# Patient Record
Sex: FEMALE | Race: WHITE | NOT HISPANIC OR LATINO | Employment: UNEMPLOYED | URBAN - NONMETROPOLITAN AREA
[De-identification: names, ages, dates, MRNs, and addresses within clinical notes are randomized per-mention and may not be internally consistent; named-entity substitution may affect disease eponyms.]

---

## 2017-08-18 ENCOUNTER — HOSPITAL ENCOUNTER (EMERGENCY)
Facility: HOSPITAL | Age: 22
Discharge: HOME | End: 2017-08-18
Attending: EMERGENCY MEDICINE | Admitting: EMERGENCY MEDICINE
Payer: COMMERCIAL

## 2017-08-18 ENCOUNTER — APPOINTMENT (EMERGENCY)
Dept: RADIOLOGY | Facility: HOSPITAL | Age: 22
End: 2017-08-18
Payer: COMMERCIAL

## 2017-08-18 VITALS
TEMPERATURE: 99.4 F | SYSTOLIC BLOOD PRESSURE: 122 MMHG | RESPIRATION RATE: 16 BRPM | HEART RATE: 69 BPM | BODY MASS INDEX: 40.85 KG/M2 | WEIGHT: 230.6 LBS | DIASTOLIC BLOOD PRESSURE: 61 MMHG | OXYGEN SATURATION: 99 %

## 2017-08-18 DIAGNOSIS — M94.0 COSTOCHONDRITIS, ACUTE: Primary | ICD-10-CM

## 2017-08-18 LAB
DEPRECATED D DIMER PPP: 383 NG/ML (FEU) (ref 0–424)
HCG UR QL: NEGATIVE
HOLD SPECIMEN: NORMAL
HOLD SPECIMEN: NORMAL
TROPONIN I SERPL-MCNC: <0.02 NG/ML

## 2017-08-18 PROCEDURE — 36415 COLL VENOUS BLD VENIPUNCTURE: CPT | Performed by: EMERGENCY MEDICINE

## 2017-08-18 PROCEDURE — 99285 EMERGENCY DEPT VISIT HI MDM: CPT

## 2017-08-18 PROCEDURE — 93005 ELECTROCARDIOGRAM TRACING: CPT | Performed by: EMERGENCY MEDICINE

## 2017-08-18 PROCEDURE — 84484 ASSAY OF TROPONIN QUANT: CPT | Performed by: EMERGENCY MEDICINE

## 2017-08-18 PROCEDURE — 85379 FIBRIN DEGRADATION QUANT: CPT | Performed by: EMERGENCY MEDICINE

## 2017-08-18 PROCEDURE — 71020 HB CHEST X-RAY 2VW FRONTAL&LATL: CPT

## 2017-08-18 PROCEDURE — 81025 URINE PREGNANCY TEST: CPT | Performed by: EMERGENCY MEDICINE

## 2017-08-18 RX ORDER — NAPROXEN 500 MG/1
500 TABLET ORAL 2 TIMES DAILY WITH MEALS
Qty: 30 TABLET | Refills: 0 | Status: SHIPPED | OUTPATIENT
Start: 2017-08-18 | End: 2018-07-05 | Stop reason: ALTCHOICE

## 2017-08-22 LAB
ATRIAL RATE: 69 BPM
P AXIS: 43 DEGREES
PR INTERVAL: 152 MS
QRS AXIS: 28 DEGREES
QRSD INTERVAL: 90 MS
QT INTERVAL: 386 MS
QTC INTERVAL: 413 MS
T WAVE AXIS: 32 DEGREES
VENTRICULAR RATE: 69 BPM

## 2018-07-05 ENCOUNTER — HOSPITAL ENCOUNTER (EMERGENCY)
Facility: HOSPITAL | Age: 23
Discharge: HOME | End: 2018-07-05
Attending: EMERGENCY MEDICINE
Payer: OTHER MISCELLANEOUS

## 2018-07-05 ENCOUNTER — APPOINTMENT (EMERGENCY)
Dept: RADIOLOGY | Facility: HOSPITAL | Age: 23
End: 2018-07-05
Payer: OTHER MISCELLANEOUS

## 2018-07-05 VITALS
OXYGEN SATURATION: 98 % | HEART RATE: 76 BPM | RESPIRATION RATE: 16 BRPM | DIASTOLIC BLOOD PRESSURE: 92 MMHG | SYSTOLIC BLOOD PRESSURE: 154 MMHG | WEIGHT: 205.69 LBS | BODY MASS INDEX: 36.44 KG/M2 | TEMPERATURE: 98.7 F

## 2018-07-05 DIAGNOSIS — S61.211A LACERATION OF LEFT INDEX FINGER: Primary | ICD-10-CM

## 2018-07-05 PROCEDURE — 90715 TDAP VACCINE 7 YRS/> IM: CPT | Performed by: EMERGENCY MEDICINE

## 2018-07-05 PROCEDURE — 90471 IMMUNIZATION ADMIN: CPT

## 2018-07-05 PROCEDURE — 99283 EMERGENCY DEPT VISIT LOW MDM: CPT

## 2018-07-05 PROCEDURE — 73130 X-RAY EXAM OF HAND: CPT

## 2018-07-05 RX ORDER — CLINDAMYCIN HYDROCHLORIDE 150 MG/1
300 CAPSULE ORAL ONCE
Status: COMPLETED | OUTPATIENT
Start: 2018-07-05 | End: 2018-07-05

## 2018-07-05 RX ORDER — LIDOCAINE HYDROCHLORIDE 10 MG/ML
5 INJECTION, SOLUTION EPIDURAL; INFILTRATION; INTRACAUDAL; PERINEURAL ONCE
Status: COMPLETED | OUTPATIENT
Start: 2018-07-05 | End: 2018-07-05

## 2018-07-05 RX ORDER — CLINDAMYCIN HYDROCHLORIDE 300 MG/1
300 CAPSULE ORAL EVERY 8 HOURS SCHEDULED
Qty: 15 CAPSULE | Refills: 0 | Status: SHIPPED | OUTPATIENT
Start: 2018-07-05 | End: 2018-07-10

## 2018-07-05 RX ADMIN — LIDOCAINE HYDROCHLORIDE 5 ML: 10 INJECTION, SOLUTION EPIDURAL; INFILTRATION; INTRACAUDAL; PERINEURAL at 22:43

## 2018-07-05 RX ADMIN — CLINDAMYCIN HYDROCHLORIDE 300 MG: 150 CAPSULE ORAL at 22:42

## 2018-07-05 RX ADMIN — TETANUS TOXOID, REDUCED DIPHTHERIA TOXOID AND ACELLULAR PERTUSSIS VACCINE, ADSORBED 0.5 ML: 5; 2.5; 8; 8; 2.5 SUSPENSION INTRAMUSCULAR at 22:44

## 2018-07-06 NOTE — ED PROVIDER NOTES
History  Chief Complaint   Patient presents with    Finger Injury     Patient cut her left index finger around 830pm and now has increased pain and swelling  26-year-old female presents with superficial laceration 2 cm to the left index finger proximal dorsal surface  Patient states this occurred 2 hours ago while at work  She states that she was using a mario knife to unclog drain and lacerated herself  Patient has full flexion and extension of the finger        Laceration   Location: Left index finger dorsal surface  Depth:  Cutaneous  Quality: straight    Bleeding: venous    Time since incident:  2 hours  Laceration mechanism:  Knife  Pain details:     Quality:  Aching    Severity:  No pain    Timing:  Constant  Relieved by:  Nothing  Worsened by:  Nothing  Tetanus status:  Out of date  Associated symptoms: no fever        None       Past Medical History:   Diagnosis Date    Depression     Insomnia     UTI (urinary tract infection)     Vertigo        Past Surgical History:   Procedure Laterality Date    WISDOM TOOTH EXTRACTION         History reviewed  No pertinent family history  I have reviewed and agree with the history as documented  Social History   Substance Use Topics    Smoking status: Current Every Day Smoker     Packs/day: 0 50     Types: Cigarettes    Smokeless tobacco: Never Used    Alcohol use Yes      Comment: occasional        Review of Systems   Constitutional: Negative for activity change, appetite change and fever  HENT: Negative for congestion, dental problem, drooling and ear discharge  Eyes: Negative for discharge and itching  Respiratory: Negative for apnea, choking and chest tightness  Cardiovascular: Negative for chest pain and leg swelling  Gastrointestinal: Negative for abdominal distention, abdominal pain and anal bleeding  Endocrine: Negative for cold intolerance and heat intolerance     Genitourinary: Negative for difficulty urinating, dyspareunia and dysuria  Musculoskeletal: Negative for arthralgias, back pain and gait problem  Skin: Positive for wound  Superficial laceration 2 cm to the left index finger   Allergic/Immunologic: Negative for environmental allergies and food allergies  Neurological: Negative for dizziness, facial asymmetry and headaches  Hematological: Negative for adenopathy  Psychiatric/Behavioral: Negative for agitation, behavioral problems, confusion and decreased concentration  All other systems reviewed and are negative  Physical Exam  Physical Exam   Constitutional: She appears well-developed and well-nourished  HENT:   Head: Normocephalic  Eyes: Right eye exhibits no discharge  Neck: Normal range of motion  No JVD present  No tracheal deviation present  No thyromegaly present  Cardiovascular: Normal rate and regular rhythm  Pulmonary/Chest: Effort normal  No respiratory distress  She has no wheezes  Abdominal: Soft  She exhibits no distension  There is no tenderness  There is no guarding  Musculoskeletal: Normal range of motion  She exhibits no edema or deformity  Neurological: She is alert  She displays normal reflexes  No cranial nerve deficit  Coordination normal    Skin: Capillary refill takes less than 2 seconds  2 cm laceration to the dorsal surface of the proximal index finger   Psychiatric: She has a normal mood and affect  Vitals reviewed        Vital Signs  ED Triage Vitals [07/05/18 2207]   Temperature Pulse Respirations Blood Pressure SpO2   98 7 °F (37 1 °C) 76 16 154/92 98 %      Temp Source Heart Rate Source Patient Position - Orthostatic VS BP Location FiO2 (%)   Temporal Monitor Sitting Right arm --      Pain Score       7           Vitals:    07/05/18 2207   BP: 154/92   Pulse: 76   Patient Position - Orthostatic VS: Sitting       Visual Acuity      ED Medications  Medications   tetanus-diphtheria-acellular pertussis (BOOSTRIX) IM injection 0 5 mL (0 5 mL Intramuscular Given 7/5/18 2244)   lidocaine (PF) (XYLOCAINE-MPF) 1 % injection 5 mL (5 mL Infiltration Given by Other 7/5/18 2243)   clindamycin (CLEOCIN) capsule 300 mg (300 mg Oral Given 7/5/18 2242)       Diagnostic Studies  Results Reviewed     None                 XR hand 3+ views LEFT   ED Interpretation by Chani Weldon DO (07/05 2250)   No fx                  Procedures  Procedures       Phone Contacts  ED Phone Contact    ED Course  ED Course as of Jul 05 2251   Thu Jul 05, 2018 2231 No fx  XR hand 3+ views LEFT                               MDM    CritCare Time    Disposition  Final diagnoses:   Laceration of left index finger     Time reflects when diagnosis was documented in both MDM as applicable and the Disposition within this note     Time User Action Codes Description Comment    7/5/2018 10:23 PM eMl Wadsworth Add [H44 491A] Laceration of left index finger       ED Disposition     ED Disposition Condition Comment    Discharge  Jj Small discharge to home/self care  Condition at discharge: Good        Follow-up Information    None         Patient's Medications   Discharge Prescriptions    CLINDAMYCIN (CLEOCIN) 300 MG CAPSULE    Take 1 capsule (300 mg total) by mouth every 8 (eight) hours for 5 days       Start Date: 7/5/2018  End Date: 7/10/2018       Order Dose: 300 mg       Quantity: 15 capsule    Refills: 0     No discharge procedures on file      ED Provider  Electronically Signed by           Chani Weldon DO  07/05/18 2252

## 2018-07-06 NOTE — DISCHARGE INSTRUCTIONS
Finger Laceration   WHAT YOU NEED TO KNOW:   A finger laceration is a deep cut in your skin  It is often caused by a sharp object, such as a knife, or blunt force to your finger  Your blood vessels, bones, joints, tendons, or nerves may also be injured  DISCHARGE INSTRUCTIONS:   Return to the emergency department if:   · Your wound comes apart  · Blood soaks through your bandage  · You have severe pain in your finger or hand  · Your finger is pale and cold  · You have sudden trouble moving your finger  · Your swelling suddenly gets worse  · You have red streaks on your skin coming from your wound  Contact your healthcare provider or hand specialist if:   · You have new numbness or tingling  · Your finger feels warm, looks swollen or red, and is draining pus  · You have a fever  · You have questions or concerns about your condition or care  Medicines: You may  need any of the following:  · Antibiotics  help prevent a bacterial infection  · Acetaminophen  decreases pain and fever  It is available without a doctor's order  Ask how much to take and how often to take it  Follow directions  Read the labels of all other medicines you are using to see if they also contain acetaminophen, or ask your doctor or pharmacist  Acetaminophen can cause liver damage if not taken correctly  Do not use more than 4 grams (4,000 milligrams) total of acetaminophen in one day  · Prescription pain medicine  may be given  Ask your healthcare provider how to take this medicine safely  Some prescription pain medicines contain acetaminophen  Do not take other medicines that contain acetaminophen without talking to your healthcare provider  Too much acetaminophen may cause liver damage  Prescription pain medicine may cause constipation  Ask your healthcare provider how to prevent or treat constipation  · Take your medicine as directed    Contact your healthcare provider if you think your medicine is not helping or if you have side effects  Tell him or her if you are allergic to any medicine  Keep a list of the medicines, vitamins, and herbs you take  Include the amounts, and when and why you take them  Bring the list or the pill bottles to follow-up visits  Carry your medicine list with you in case of an emergency  Self-care:   · Apply ice  on your finger for 15 to 20 minutes every hour or as directed  Use an ice pack, or put crushed ice in a plastic bag  Cover it with a towel before you apply it to your skin  Ice helps prevent tissue damage and decreases swelling and pain  · Elevate  your hand above the level of your heart as often as you can  This will help decrease swelling and pain  Prop your hand on pillows or blankets to keep it elevated comfortably  · Wear your splint as directed  A splint will decrease movement and stress on your wound  The splint may help your wound heal faster  Ask your healthcare provider how to apply and remove a splint  · Apply ointments to decrease scarring  Do not apply ointments until your healthcare provider says it is okay  You may need to wait until your wound is healed  Ask which ointment to buy and how often to use it  Wound care:   · Do not get your wound wet until your healthcare provider says it is okay  Do not soak your hand in water  Do not go swimming until your healthcare provider says it is okay  When your healthcare provider says it is okay, carefully wash around the wound with soap and water  Let soap and water run over your wound  Gently pat the area dry or allow it to air dry  · Change your bandages when they get wet, dirty, or after washing  Apply new, clean bandages as directed  Do not apply elastic bandages or tape too tightly  Do not put powders or lotions on your wound  · Apply antibiotic ointment as directed  Your healthcare provider may give you antibiotic ointment to put over your wound if you have stitches   If you have Strips-Strips over your wound, let them dry up and fall off on their own  If they do not fall off within 14 days, gently remove them  If you have glue over your wound, do not remove or pick at it  If your glue comes off, do not replace it with glue that you have at home  · Check your wound every day for signs of infection  Signs of infection include swelling, redness, or pus  Follow up with your healthcare provider or hand specialist in 2 days:  Write down your questions so you remember to ask them during your visits  © 2017 [employer]  Information is for End User's use only and may not be sold, redistributed or otherwise used for commercial purposes  All illustrations and images included in CareNotes® are the copyrighted property of [employer] , [employer]  or Ren Calles  The above information is an  only  It is not intended as medical advice for individual conditions or treatments  Talk to your doctor, nurse or pharmacist before following any medical regimen to see if it is safe and effective for you

## 2018-07-06 NOTE — ED PROCEDURE NOTE
PROCEDURE  Lac Repair  Date/Time: 7/5/2018 10:50 PM  Performed by: Bethel Sims  Authorized by: Bethel Sims   Consent: Verbal consent obtained  Location: Left index finger dorsum proximal between the MCP and PIP joint  Wound length (cm): 2  Tendon involvement: none  Anesthesia: local infiltration    Anesthesia:  Local Anesthetic: lidocaine 1% with epinephrine  Anesthetic total (ml): 3  Wound Dehiscence:  Superficial Wound Dehiscence: simple closure      Procedure Details:  Irrigation solution: saline  Irrigation method: syringe  Amount of cleaning: Scrub with chlorhexidine scrub brush  Skin closure: 5-0 nylon  Number of sutures: 4    Technique: simple  Approximation: close  Approximation difficulty: simple  Dressing: 4x4 sterile gauze and gauze roll  Patient tolerance: Patient tolerated the procedure well with no immediate complications           Jake Nj DO  07/05/18 2313

## 2019-05-09 ENCOUNTER — OFFICE VISIT (OUTPATIENT)
Dept: URGENT CARE | Facility: CLINIC | Age: 24
End: 2019-05-09
Payer: COMMERCIAL

## 2019-05-09 VITALS
HEART RATE: 78 BPM | DIASTOLIC BLOOD PRESSURE: 92 MMHG | RESPIRATION RATE: 18 BRPM | OXYGEN SATURATION: 98 % | TEMPERATURE: 98.3 F | SYSTOLIC BLOOD PRESSURE: 159 MMHG

## 2019-05-09 DIAGNOSIS — Z01.10 NORMAL EAR EXAM: Primary | ICD-10-CM

## 2019-05-09 PROCEDURE — G0382 LEV 3 HOSP TYPE B ED VISIT: HCPCS | Performed by: NURSE PRACTITIONER

## 2019-07-18 ENCOUNTER — APPOINTMENT (OUTPATIENT)
Dept: URGENT CARE | Facility: CLINIC | Age: 24
End: 2019-07-18
Payer: OTHER MISCELLANEOUS

## 2019-07-18 ENCOUNTER — HOSPITAL ENCOUNTER (EMERGENCY)
Facility: HOSPITAL | Age: 24
Discharge: HOME | End: 2019-07-18
Attending: EMERGENCY MEDICINE | Admitting: EMERGENCY MEDICINE
Payer: OTHER MISCELLANEOUS

## 2019-07-18 VITALS
OXYGEN SATURATION: 98 % | HEART RATE: 79 BPM | TEMPERATURE: 98.1 F | RESPIRATION RATE: 19 BRPM | DIASTOLIC BLOOD PRESSURE: 103 MMHG | SYSTOLIC BLOOD PRESSURE: 179 MMHG | BODY MASS INDEX: 33.59 KG/M2 | WEIGHT: 189.6 LBS
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
## 2019-07-18 PROCEDURE — 99283 EMERGENCY DEPT VISIT LOW MDM: CPT

## 2019-07-18 PROCEDURE — 96372 THER/PROPH/DIAG INJ SC/IM: CPT

## 2019-07-18 PROCEDURE — 90471 IMMUNIZATION ADMIN: CPT

## 2019-07-18 PROCEDURE — 99284 EMERGENCY DEPT VISIT MOD MDM: CPT | Performed by: EMERGENCY MEDICINE

## 2019-07-18 PROCEDURE — 90675 RABIES VACCINE IM: CPT | Performed by: EMERGENCY MEDICINE

## 2019-07-18 PROCEDURE — G0382 LEV 3 HOSP TYPE B ED VISIT: HCPCS

## 2019-07-18 PROCEDURE — 90375 RABIES IG IM/SC: CPT | Performed by: EMERGENCY MEDICINE

## 2019-07-18 RX ORDER — AMOXICILLIN AND CLAVULANATE POTASSIUM 875; 125 MG/1; MG/1
1 TABLET, FILM COATED ORAL ONCE
Status: COMPLETED | OUTPATIENT
Start: 2019-07-18 | End: 2019-07-18

## 2019-07-18 RX ORDER — AMOXICILLIN AND CLAVULANATE POTASSIUM 875; 125 MG/1; MG/1
1 TABLET, FILM COATED ORAL EVERY 12 HOURS
Qty: 10 TABLET | Refills: 0 | Status: SHIPPED | OUTPATIENT
Start: 2019-07-18 | End: 2019-07-23

## 2019-07-18 RX ADMIN — RABIES IMMUNE GLOBULIN (HUMAN) 1710 UNITS: 300 INJECTION, SOLUTION INFILTRATION; INTRAMUSCULAR at 23:01

## 2019-07-18 RX ADMIN — AMOXICILLIN AND CLAVULANATE POTASSIUM 1 TABLET: 875; 125 TABLET, FILM COATED ORAL at 22:55

## 2019-07-18 RX ADMIN — Medication 1 ML: at 22:56

## 2019-07-19 NOTE — DISCHARGE INSTRUCTIONS
Please follow-up with primary care provider, anything changes or worsens, please return emergency department  I am giving you antibiotics to treat presumed dog bite    You will need repeat vaccines for rabies in 3 days on Sunday, July 21st, 7 days, July 25, 14 days, on august 1st

## 2019-07-20 NOTE — ED PROVIDER NOTES
History  Chief Complaint   Patient presents with    Dog Bite     Patient was restaining a dog for a blood draw and either got scrathed or bite by a dog  Dog was not vaccinated or up to date on vaccinations  Patient is here for [facility] and rabies shot      HPI   22-year-old woman presents after dog bite  Patient works in a veterinary in the office, was holding down a dog that is known to not be vaccinated  She thinks that the dog bit her face however there is some confusion if it was a bite or scratch  Patient is up-to-date on her tetanus  Denies any headache neck pain chest pain shortness of breath abdominal pain  None       Past Medical History:   Diagnosis Date    Depression     Insomnia     UTI (urinary tract infection)     Vertigo        Past Surgical History:   Procedure Laterality Date    WISDOM TOOTH EXTRACTION         History reviewed  No pertinent family history  I have reviewed and agree with the history as documented  Social History     Tobacco Use    Smoking status: Current Every Day Smoker     Packs/day: 0 50     Types: Cigarettes    Smokeless tobacco: Never Used   Substance Use Topics    Alcohol use: Yes     Comment: occasional    Drug use: No        Review of Systems   Constitutional: Negative  Negative for chills and fever  HENT: Negative  Negative for congestion and sore throat  Eyes: Negative  Negative for discharge and redness  Respiratory: Negative  Negative for chest tightness and shortness of breath  Cardiovascular: Negative  Negative for chest pain and palpitations  Gastrointestinal: Negative  Negative for abdominal pain, nausea and vomiting  Endocrine: Negative  Negative for cold intolerance and polyphagia  Genitourinary: Negative  Negative for difficulty urinating and dysuria  Musculoskeletal: Negative  Negative for arthralgias and back pain  Skin: Positive for wound  Negative for color change  Allergic/Immunologic: Negative    Negative for environmental allergies  Neurological: Negative  Negative for dizziness, weakness and headaches  Hematological: Negative  Psychiatric/Behavioral: Negative  Negative for behavioral problems  The patient is not nervous/anxious  All other systems reviewed and are negative  Physical Exam  Physical Exam   Constitutional: She is oriented to person, place, and time  She appears well-developed and well-nourished  No distress  HENT:   Head: Normocephalic and atraumatic  Right Ear: External ear normal    Left Ear: External ear normal    Mouth/Throat: Oropharynx is clear and moist    Patient is broken skin adjacent to the left eye, conjunctiva is intact without scratch her redness  Extraocular muscles are intact  Eyes: Pupils are equal, round, and reactive to light  Conjunctivae and EOM are normal  Right eye exhibits no discharge  Left eye exhibits no discharge  No scleral icterus  Neck: Normal range of motion  Neck supple  No tracheal deviation present  No thyromegaly present  Cardiovascular: Normal rate, regular rhythm and intact distal pulses  Exam reveals no gallop and no friction rub  No murmur heard  Pulmonary/Chest: Effort normal and breath sounds normal  No stridor  No respiratory distress  She has no wheezes  She has no rales  Abdominal: Soft  Bowel sounds are normal  She exhibits no distension  There is no tenderness  There is no rebound and no guarding  Musculoskeletal: Normal range of motion  She exhibits no edema or deformity  Neurological: She is alert and oriented to person, place, and time  No cranial nerve deficit  Skin: Skin is warm and dry  No rash noted  She is not diaphoretic  No erythema  Psychiatric: She has a normal mood and affect  Her behavior is normal  Thought content normal    Nursing note and vitals reviewed        Vital Signs  ED Triage Vitals [07/18/19 2017]   Temperature Pulse Respirations Blood Pressure SpO2   98 1 °F (36 7 °C) 79 19 (!) 179/103 98 % Temp Source Heart Rate Source Patient Position - Orthostatic VS BP Location FiO2 (%)   Temporal Monitor Sitting Right arm --      Pain Score       6           Vitals:    07/18/19 2017   BP: (!) 179/103   Pulse: 79   Patient Position - Orthostatic VS: Sitting         Visual Acuity      ED Medications  Medications   rabies vaccine, human diploid (IMOVAX RABIES) IM injection 1 mL (1 mL Intramuscular Given 7/18/19 2256)   rabies immune globulin, human (HyperRAB) injection 1,710 Units (1,710 Units Infiltration Given 7/18/19 2301)   amoxicillin-clavulanate (AUGMENTIN) 875-125 mg per tablet 1 tablet (1 tablet Oral Given 7/18/19 2255)       Diagnostic Studies  Results Reviewed     None                 No orders to display              Procedures  Procedures       ED Course      I personally discussed return precautions with this patient and family  I provided the patient with written discharge instructions and particularly highlighted specific areas of interest to this patient, including but not limited to: medications for symptom managment, follow up recommendations, and return precautions  Patient and family are in agreement with this plan as outlined above  MDM  Number of Diagnoses or Management Options  Bite from dog:   Diagnosis management comments: 61-year-old woman presents after dog bite versus scratch  Patient is a  and believes this was a bite  Discussed cc recommendation of observing the animal for 10 days verses vaccination  Patient states she knows this talk was not vaccinated, she has no way of knowing if the dog will be watched and has no contact with the owner  Discussed requiring for different vaccinations +meeting globulin  Patient is in agreement and would like rabies immunoglobulin and vaccinations  Will treat patient your coulee with Augmentin here and give her outpatient antibiotics        Disposition  Final diagnoses:   Bite from dog     Time reflects when diagnosis was documented in both MDM as applicable and the Disposition within this note     Time User Action Codes Description Comment    7/18/2019 11:01 PM Valeda Alonzo  0XXA] Bite from dog       ED Disposition     ED Disposition Condition Date/Time Comment    Discharge Stable Thu Jul 18, 2019 11:04 PM Sylvain Murrell discharge to home/self care  Follow-up Information    None         Discharge Medication List as of 7/18/2019 11:04 PM      START taking these medications    Details   amoxicillin-clavulanate (AUGMENTIN) 875-125 mg per tablet Take 1 tablet by mouth every 12 (twelve) hours for 5 days, Starting Thu 7/18/2019, Until Tue 7/23/2019, Print           No discharge procedures on file      ED Provider  Electronically Signed by           Margret Colmenares MD  07/19/19 6638

## 2019-07-21 ENCOUNTER — HOSPITAL ENCOUNTER (EMERGENCY)
Facility: HOSPITAL | Age: 24
Discharge: HOME | End: 2019-07-21
Attending: EMERGENCY MEDICINE | Admitting: EMERGENCY MEDICINE
Payer: OTHER MISCELLANEOUS

## 2019-07-21 VITALS
TEMPERATURE: 98.5 F | RESPIRATION RATE: 16 BRPM | BODY MASS INDEX: 33.59 KG/M2 | WEIGHT: 189.6 LBS | HEIGHT: 63 IN | OXYGEN SATURATION: 98 % | HEART RATE: 87 BPM | SYSTOLIC BLOOD PRESSURE: 126 MMHG | DIASTOLIC BLOOD PRESSURE: 82 MMHG

## 2019-07-21 DIAGNOSIS — Z23 ENCOUNTER FOR REPEAT ADMINISTRATION OF RABIES VACCINATION: Primary | ICD-10-CM

## 2019-07-21 PROCEDURE — 99281 EMR DPT VST MAYX REQ PHY/QHP: CPT | Performed by: PHYSICIAN ASSISTANT

## 2019-07-21 PROCEDURE — 90471 IMMUNIZATION ADMIN: CPT

## 2019-07-21 PROCEDURE — 90675 RABIES VACCINE IM: CPT | Performed by: PHYSICIAN ASSISTANT

## 2019-07-21 RX ADMIN — RABIES VIRUS STRAIN PM-1503-3M ANTIGEN (PROPIOLACTONE INACTIVATED) AND WATER 1 ML: KIT at 13:52

## 2019-07-21 NOTE — ED PROVIDER NOTES
History  Chief Complaint   Patient presents with    Follow Up Rabies     Patient here for rabies series, day 3     Patient presents to the emergency department today for evaluation dog bite  She was here on the 18th of this month and evaluated  There is uncertain history of dog bite versus dog scratch just lateral to the left eye  She was treated with rabies series and Augmentin  She is here for her 3 day re-evaluation of rabies series  She states she is taking her Augmentin  She denies any bleeding or drainage  She has a minimal amount of contusion just lateral to left eye complains of occasional dizziness  No fevers chills sweats redness bleeding or discharge  Prior to Admission Medications   Prescriptions Last Dose Informant Patient Reported? Taking?   amoxicillin-clavulanate (AUGMENTIN) 875-125 mg per tablet   No No   Sig: Take 1 tablet by mouth every 12 (twelve) hours for 5 days      Facility-Administered Medications: None       Past Medical History:   Diagnosis Date    Depression     Insomnia     UTI (urinary tract infection)     Vertigo        Past Surgical History:   Procedure Laterality Date    WISDOM TOOTH EXTRACTION         History reviewed  No pertinent family history  I have reviewed and agree with the history as documented  Social History     Tobacco Use    Smoking status: Current Every Day Smoker     Packs/day: 0 50     Types: Cigarettes    Smokeless tobacco: Never Used   Substance Use Topics    Alcohol use: Yes     Comment: occasional    Drug use: No        Review of Systems   Constitutional: Negative  HENT: Negative  Respiratory: Negative  Cardiovascular: Negative  Skin: Positive for wound  All other systems reviewed and are negative  Physical Exam  Physical Exam   Constitutional: She is oriented to person, place, and time  She appears well-developed and well-nourished  No distress  HENT:   Head: Normocephalic         Right Ear: External ear normal    Left Ear: External ear normal    Nose: Nose normal    Mouth/Throat: Oropharynx is clear and moist  No oropharyngeal exudate  Patient has an area of age contusion in this region which is very minimally tender  No swelling  No bleeding or discharge  No evidence of fluctuance  Eyes: Pupils are equal, round, and reactive to light  Conjunctivae and EOM are normal  Right eye exhibits no discharge  Left eye exhibits no discharge  No scleral icterus  Neck: Normal range of motion  No JVD present  No tracheal deviation present  No thyromegaly present  Cardiovascular: Normal rate  Pulmonary/Chest: Effort normal    Musculoskeletal: Normal range of motion  Lymphadenopathy:     She has no cervical adenopathy  Neurological: She is alert and oriented to person, place, and time  Skin: Capillary refill takes less than 2 seconds  She is not diaphoretic  Psychiatric: She has a normal mood and affect         Vital Signs  ED Triage Vitals [07/21/19 1341]   Temperature Pulse Respirations Blood Pressure SpO2   98 5 °F (36 9 °C) 78 16 143/96 98 %      Temp Source Heart Rate Source Patient Position - Orthostatic VS BP Location FiO2 (%)   Temporal Monitor Sitting Right arm --      Pain Score       3           Vitals:    07/21/19 1341   BP: 143/96   Pulse: 78   Patient Position - Orthostatic VS: Sitting         Visual Acuity      ED Medications  Medications   rabies vaccine, human diploid (IMOVAX RABIES) IM injection 1 mL (has no administration in time range)       Diagnostic Studies  Results Reviewed     None                 No orders to display              Procedures  Procedures       ED Course                               MDM    Disposition  Final diagnoses:   Encounter for repeat administration of rabies vaccination     Time reflects when diagnosis was documented in both MDM as applicable and the Disposition within this note     Time User Action Codes Description Comment    7/21/2019  1:43 PM Natalia Ontiveros D Add [Z23] Encounter for repeat administration of rabies vaccination       ED Disposition     ED Disposition Condition Date/Time Comment    Discharge Good Sun Jul 21, 2019  1:43 PM Vera Flores discharge to home/self care  Follow-up Information     Follow up With Specialties Details Why Contact Info Additional Information    [facility] Emergency Department Emergency Medicine Go in 4 days  [place] [id number]  [phone] [place] ED, [address], [address]          Patient's Medications   Discharge Prescriptions    No medications on file     No discharge procedures on file      ED Provider  Electronically Signed by           Jaymie Holder PA-C  07/21/19 4859

## 2019-07-25 ENCOUNTER — HOSPITAL ENCOUNTER (EMERGENCY)
Facility: HOSPITAL | Age: 24
Discharge: HOME | End: 2019-07-25
Attending: EMERGENCY MEDICINE
Payer: COMMERCIAL

## 2019-07-25 VITALS
HEIGHT: 63 IN | RESPIRATION RATE: 16 BRPM | TEMPERATURE: 98.3 F | SYSTOLIC BLOOD PRESSURE: 135 MMHG | HEART RATE: 83 BPM | BODY MASS INDEX: 33.59 KG/M2 | WEIGHT: 189.6 LBS | OXYGEN SATURATION: 100 % | DIASTOLIC BLOOD PRESSURE: 80 MMHG

## 2019-07-25 DIAGNOSIS — Z23 ENCOUNTER FOR REPEAT ADMINISTRATION OF RABIES VACCINATION: Primary | ICD-10-CM

## 2019-07-25 PROCEDURE — 99281 EMR DPT VST MAYX REQ PHY/QHP: CPT | Performed by: PHYSICIAN ASSISTANT

## 2019-07-25 PROCEDURE — 90471 IMMUNIZATION ADMIN: CPT

## 2019-07-25 PROCEDURE — 90675 RABIES VACCINE IM: CPT

## 2019-07-25 RX ADMIN — RABIES VIRUS STRAIN PM-1503-3M ANTIGEN (PROPIOLACTONE INACTIVATED) AND WATER 1 ML: KIT at 11:11

## 2019-07-25 NOTE — ED PROVIDER NOTES
History  Chief Complaint   Patient presents with    Follow Up Rabies     Patient presents for 3rd rabies shot     Patient presents to the emergency department today via private vehicle for routine rabies administration of vaccination  She he is currently here for day 7 rabies vaccine which is her 3rd  She received on initial visit today 0 followed by 2nd visit on day 3  She denies any changes from the last visit which I did see her for fortunately  There is some age contusion that is noted just lateral to the left eye however she denies tenderness bleeding or drainage from the wound  There is no history of fevers  No ocular complaints  Cannot display prior to admission medications because the patient has not been admitted in this contact  Past Medical History:   Diagnosis Date    Depression     Insomnia     UTI (urinary tract infection)     Vertigo        Past Surgical History:   Procedure Laterality Date    WISDOM TOOTH EXTRACTION         History reviewed  No pertinent family history  I have reviewed and agree with the history as documented  Social History     Tobacco Use    Smoking status: Current Every Day Smoker     Packs/day: 0 50     Types: Cigarettes    Smokeless tobacco: Never Used   Substance Use Topics    Alcohol use: Yes     Comment: occasional    Drug use: No        Review of Systems   Constitutional: Negative  HENT: Negative  Respiratory: Negative  Cardiovascular: Negative  Musculoskeletal: Negative  Skin: Positive for wound  Hematological: Negative  Psychiatric/Behavioral: Negative  All other systems reviewed and are negative  Physical Exam  Physical Exam   Constitutional: She is oriented to person, place, and time  She appears well-developed and well-nourished  No distress  HENT:   Head: Normocephalic     Right Ear: External ear normal    Left Ear: External ear normal    Nose: Nose normal    Mouth/Throat: Oropharynx is clear and moist  No oropharyngeal exudate  Patient has exhibited age contusion just left lateral to the left eye  Negative tenderness  Negative discharge bleeding  no evidence of wound dehiscence  Eyes: Pupils are equal, round, and reactive to light  Conjunctivae and EOM are normal    Neck: Normal range of motion  Neck supple  Cardiovascular: Normal rate and regular rhythm  Pulmonary/Chest: Effort normal and breath sounds normal    Musculoskeletal: Normal range of motion  She exhibits no tenderness or deformity  Neurological: She is alert and oriented to person, place, and time  Skin: Capillary refill takes less than 2 seconds  She is not diaphoretic  Psychiatric: She has a normal mood and affect  Her behavior is normal    Vitals reviewed        Vital Signs  ED Triage Vitals [07/25/19 1105]   Temperature Pulse Respirations Blood Pressure SpO2   98 3 °F (36 8 °C) 83 16 135/80 100 %      Temp Source Heart Rate Source Patient Position - Orthostatic VS BP Location FiO2 (%)   Temporal Monitor Sitting Right arm --      Pain Score       No Pain           Vitals:    07/25/19 1105   BP: 135/80   Pulse: 83   Patient Position - Orthostatic VS: Sitting         Visual Acuity      ED Medications  Medications   rabies vaccine, human diploid (IMOVAX RABIES) IM injection 1 mL (1 mL Intramuscular Given 7/25/19 1111)       Diagnostic Studies  Results Reviewed     None                 No orders to display              Procedures  Procedures       ED Course                               MDM    Disposition  Final diagnoses:   Encounter for repeat administration of rabies vaccination     Time reflects when diagnosis was documented in both MDM as applicable and the Disposition within this note     Time User Action Codes Description Comment    7/25/2019 11:08 AM Fariba Wisdom Add [Z23] Encounter for repeat administration of rabies vaccination       ED Disposition     ED Disposition Condition Date/Time Comment    Discharge Good Thu Jul 25, 2019 11:08 AM Sylvain Murrell discharge to home/self care  Follow-up Information     Follow up With Specialties Details Why Contact Info Additional Information    [facility] Emergency Department Emergency Medicine In 1 week  [place] [id number] [phone] [place] ED, [address], [address]          Patient's Medications    No medications on file     No discharge procedures on file      ED Provider  Electronically Signed by           Chapito Castro PA-C  07/25/19 1110

## 2019-08-01 ENCOUNTER — HOSPITAL ENCOUNTER (EMERGENCY)
Facility: HOSPITAL | Age: 24
Discharge: HOME | End: 2019-08-01
Attending: EMERGENCY MEDICINE
Payer: COMMERCIAL

## 2019-08-01 VITALS
TEMPERATURE: 98.3 F | HEIGHT: 63 IN | OXYGEN SATURATION: 98 % | SYSTOLIC BLOOD PRESSURE: 121 MMHG | WEIGHT: 191.14 LBS | BODY MASS INDEX: 33.87 KG/M2 | RESPIRATION RATE: 20 BRPM | DIASTOLIC BLOOD PRESSURE: 76 MMHG | HEART RATE: 80 BPM

## 2019-08-01 DIAGNOSIS — Z23 ENCOUNTER FOR REPEAT ADMINISTRATION OF RABIES VACCINATION: Primary | ICD-10-CM

## 2019-08-01 PROCEDURE — 90471 IMMUNIZATION ADMIN: CPT

## 2019-08-01 PROCEDURE — 99281 EMR DPT VST MAYX REQ PHY/QHP: CPT | Performed by: PHYSICIAN ASSISTANT

## 2019-08-01 PROCEDURE — 90675 RABIES VACCINE IM: CPT | Performed by: PHYSICIAN ASSISTANT

## 2019-08-01 RX ORDER — ACETAMINOPHEN,DIPHENHYDRAMINE HCL 500; 25 MG/1; MG/1
1 TABLET, FILM COATED ORAL
COMMUNITY
End: 2020-07-20 | Stop reason: ALTCHOICE

## 2019-08-01 RX ADMIN — RABIES VIRUS STRAIN PM-1503-3M ANTIGEN (PROPIOLACTONE INACTIVATED) AND WATER 1 ML: KIT at 10:49

## 2019-08-01 NOTE — ED PROVIDER NOTES
History  Chief Complaint   Patient presents with    Follow Up Rabies     PT is here for the last dose of rabies vaccine     27year old female presents for final rabies vaccine  Was originally seen here at our facility on 7/18/19 after getting bit vs scratched on the face by a dog while trying to draw blood where she works in [employer]  She notes area on face has healed  She notes she had bruising which has resolved  She offers no complaints  Received immunoglobulin as well as 3 prior rabies vaccines without issues  She took antibiotic as prescribed  She is here for the 4th and final rabies vaccine  History provided by:  Patient   used: No        Prior to Admission Medications   Prescriptions Last Dose Informant Patient Reported? Taking? diphenhydrAMINE-acetaminophen (TYLENOL PM)  MG TABS   Yes Yes   Sig: Take 1 tablet by mouth daily at bedtime as needed for sleep      Facility-Administered Medications: None       Past Medical History:   Diagnosis Date    Depression     Insomnia     UTI (urinary tract infection)     Vertigo        Past Surgical History:   Procedure Laterality Date    WISDOM TOOTH EXTRACTION         History reviewed  No pertinent family history  I have reviewed and agree with the history as documented  Social History     Tobacco Use    Smoking status: Current Every Day Smoker     Packs/day: 0 50     Types: Cigarettes    Smokeless tobacco: Never Used   Substance Use Topics    Alcohol use: Yes     Comment: occasional    Drug use: No        Review of Systems   Constitutional: Negative  Negative for fever  HENT: Negative  Eyes: Negative  Negative for pain, discharge, redness and visual disturbance  Respiratory: Negative  Cardiovascular: Negative  Gastrointestinal: Negative  Genitourinary: Negative  Musculoskeletal: Negative  Skin: Negative  Negative for color change and wound  Neurological: Negative      All other systems reviewed and are negative  Physical Exam  Physical Exam   Constitutional: She is oriented to person, place, and time  She appears well-developed and well-nourished  No distress  HENT:   Head: Normocephalic and atraumatic  Right Ear: Hearing, tympanic membrane, external ear and ear canal normal    Left Ear: Hearing, tympanic membrane, external ear and ear canal normal    Nose: Nose normal    Mouth/Throat: Uvula is midline, oropharynx is clear and moist and mucous membranes are normal    Eyes: Pupils are equal, round, and reactive to light  Conjunctivae, EOM and lids are normal    + glasses  Small scar <0 5 cm superior lateral to left eye, well healed  No redness or bruising  Neck: Neck supple  Cardiovascular: Normal rate, regular rhythm, normal heart sounds and intact distal pulses  No murmur heard  Pulmonary/Chest: Effort normal and breath sounds normal  No respiratory distress  She has no wheezes  Musculoskeletal: Normal range of motion  She exhibits no edema, tenderness or deformity  Neurological: She is alert and oriented to person, place, and time  Skin: Skin is warm and dry  Capillary refill takes less than 2 seconds  No rash noted  Psychiatric: She has a normal mood and affect  Nursing note and vitals reviewed        Vital Signs  ED Triage Vitals [08/01/19 1030]   Temperature Pulse Respirations Blood Pressure SpO2   98 3 °F (36 8 °C) 84 20 122/74 98 %      Temp Source Heart Rate Source Patient Position - Orthostatic VS BP Location FiO2 (%)   Temporal Monitor Lying Right arm --      Pain Score       No Pain           Vitals:    08/01/19 1030 08/01/19 1045   BP: 122/74 121/76   Pulse: 84 80   Patient Position - Orthostatic VS: Lying Lying         Visual Acuity      ED Medications  Medications   rabies vaccine, human diploid (IMOVAX RABIES) IM injection 1 mL (1 mL Intramuscular Given 8/1/19 1049)       Diagnostic Studies  Results Reviewed     None                 No orders to display              Procedures  Procedures       ED Course     Previous records reviewed  Pt here today for 4th and final rabies vaccine in series  Area has completely healed  No further concerns at this time  F/u with PCP or return here PRN  MDM  Number of Diagnoses or Management Options  Encounter for repeat administration of rabies vaccination: minor     Amount and/or Complexity of Data Reviewed  Decide to obtain previous medical records or to obtain history from someone other than the patient: yes  Review and summarize past medical records: yes    Patient Progress  Patient progress: improved      Disposition  Final diagnoses:   Encounter for repeat administration of rabies vaccination     Time reflects when diagnosis was documented in both MDM as applicable and the Disposition within this note     Time User Action Codes Description Comment    8/1/2019 10:40 AM Sima Meredith Add [Z23] Encounter for repeat administration of rabies vaccination       ED Disposition     ED Disposition Condition Date/Time Comment    Discharge Stable u Aug 1, 2019 10:40 AM Li Check discharge to home/self care  Follow-up Information     Follow up With Specialties Details Why Contact Info Additional Information    [facility] Emergency Department Emergency Medicine  As needed [address] [facility] [address], [address]          Patient's Medications   Discharge Prescriptions    No medications on file     No discharge procedures on file      ED Provider  Electronically Signed by           Kiel Ochoa PA-C  08/01/19 3939

## 2020-01-22 ENCOUNTER — OFFICE VISIT (OUTPATIENT)
Dept: FAMILY MEDICINE CLINIC | Facility: CLINIC | Age: 25
End: 2020-01-22
Payer: COMMERCIAL

## 2020-01-22 ENCOUNTER — APPOINTMENT (OUTPATIENT)
Dept: LAB | Facility: MEDICAL CENTER | Age: 25
End: 2020-01-22
Payer: COMMERCIAL

## 2020-01-22 ENCOUNTER — TELEPHONE (OUTPATIENT)
Dept: BEHAVIORAL/MENTAL HEALTH CLINIC | Facility: CLINIC | Age: 25
End: 2020-01-22

## 2020-01-22 VITALS
SYSTOLIC BLOOD PRESSURE: 130 MMHG | HEIGHT: 63 IN | DIASTOLIC BLOOD PRESSURE: 92 MMHG | RESPIRATION RATE: 18 BRPM | WEIGHT: 177 LBS | BODY MASS INDEX: 31.36 KG/M2 | HEART RATE: 80 BPM | OXYGEN SATURATION: 98 % | TEMPERATURE: 96.4 F

## 2020-01-22 DIAGNOSIS — Z11.4 SCREENING FOR HIV WITHOUT PRESENCE OF RISK FACTORS: ICD-10-CM

## 2020-01-22 DIAGNOSIS — Z23 NEED FOR HPV VACCINATION: ICD-10-CM

## 2020-01-22 DIAGNOSIS — R42 VERTIGO: ICD-10-CM

## 2020-01-22 DIAGNOSIS — Z12.4 CERVICAL CANCER SCREENING: ICD-10-CM

## 2020-01-22 DIAGNOSIS — F41.8 DEPRESSION WITH ANXIETY: ICD-10-CM

## 2020-01-22 DIAGNOSIS — Z91.89 NEED FOR DENTAL CARE: ICD-10-CM

## 2020-01-22 DIAGNOSIS — Z76.89 ENCOUNTER TO ESTABLISH CARE: Primary | ICD-10-CM

## 2020-01-22 LAB
ALBUMIN SERPL BCP-MCNC: 4.5 G/DL (ref 3.5–5)
ALP SERPL-CCNC: 60 U/L (ref 46–116)
ALT SERPL W P-5'-P-CCNC: 26 U/L (ref 12–78)
ANION GAP SERPL CALCULATED.3IONS-SCNC: 2 MMOL/L (ref 4–13)
AST SERPL W P-5'-P-CCNC: 11 U/L (ref 5–45)
BASOPHILS # BLD AUTO: 0.02 THOUSANDS/ΜL (ref 0–0.1)
BASOPHILS NFR BLD AUTO: 0 % (ref 0–1)
BILIRUB SERPL-MCNC: 0.31 MG/DL (ref 0.2–1)
BUN SERPL-MCNC: 6 MG/DL (ref 5–25)
CALCIUM SERPL-MCNC: 9.8 MG/DL (ref 8.3–10.1)
CHLORIDE SERPL-SCNC: 111 MMOL/L (ref 100–108)
CO2 SERPL-SCNC: 28 MMOL/L (ref 21–32)
CREAT SERPL-MCNC: 0.55 MG/DL (ref 0.6–1.3)
EOSINOPHIL # BLD AUTO: 0.16 THOUSAND/ΜL (ref 0–0.61)
EOSINOPHIL NFR BLD AUTO: 3 % (ref 0–6)
ERYTHROCYTE [DISTWIDTH] IN BLOOD BY AUTOMATED COUNT: 12.6 % (ref 11.6–15.1)
GFR SERPL CREATININE-BSD FRML MDRD: 134 ML/MIN/1.73SQ M
GLUCOSE P FAST SERPL-MCNC: 85 MG/DL (ref 65–99)
HCT VFR BLD AUTO: 36.4 % (ref 34.8–46.1)
HGB BLD-MCNC: 11.7 G/DL (ref 11.5–15.4)
IMM GRANULOCYTES # BLD AUTO: 0.01 THOUSAND/UL (ref 0–0.2)
IMM GRANULOCYTES NFR BLD AUTO: 0 % (ref 0–2)
LYMPHOCYTES # BLD AUTO: 2.46 THOUSANDS/ΜL (ref 0.6–4.47)
LYMPHOCYTES NFR BLD AUTO: 44 % (ref 14–44)
MCH RBC QN AUTO: 27.7 PG (ref 26.8–34.3)
MCHC RBC AUTO-ENTMCNC: 32.1 G/DL (ref 31.4–37.4)
MCV RBC AUTO: 86 FL (ref 82–98)
MONOCYTES # BLD AUTO: 0.35 THOUSAND/ΜL (ref 0.17–1.22)
MONOCYTES NFR BLD AUTO: 6 % (ref 4–12)
NEUTROPHILS # BLD AUTO: 2.61 THOUSANDS/ΜL (ref 1.85–7.62)
NEUTS SEG NFR BLD AUTO: 47 % (ref 43–75)
NRBC BLD AUTO-RTO: 0 /100 WBCS
PLATELET # BLD AUTO: 253 THOUSANDS/UL (ref 149–390)
PMV BLD AUTO: 11.6 FL (ref 8.9–12.7)
POTASSIUM SERPL-SCNC: 4.4 MMOL/L (ref 3.5–5.3)
PROT SERPL-MCNC: 8.3 G/DL (ref 6.4–8.2)
RBC # BLD AUTO: 4.22 MILLION/UL (ref 3.81–5.12)
SODIUM SERPL-SCNC: 141 MMOL/L (ref 136–145)
TSH SERPL DL<=0.05 MIU/L-ACNC: 2.86 UIU/ML (ref 0.36–3.74)
WBC # BLD AUTO: 5.61 THOUSAND/UL (ref 4.31–10.16)

## 2020-01-22 PROCEDURE — 99204 OFFICE O/P NEW MOD 45 MIN: CPT | Performed by: FAMILY MEDICINE

## 2020-01-22 PROCEDURE — 87389 HIV-1 AG W/HIV-1&-2 AB AG IA: CPT

## 2020-01-22 PROCEDURE — 85025 COMPLETE CBC W/AUTO DIFF WBC: CPT

## 2020-01-22 PROCEDURE — 80053 COMPREHEN METABOLIC PANEL: CPT

## 2020-01-22 PROCEDURE — 84443 ASSAY THYROID STIM HORMONE: CPT

## 2020-01-22 PROCEDURE — T1015 CLINIC SERVICE: HCPCS | Performed by: FAMILY MEDICINE

## 2020-01-22 PROCEDURE — 36415 COLL VENOUS BLD VENIPUNCTURE: CPT

## 2020-01-22 NOTE — TELEPHONE ENCOUNTER
PC- to [facility]- therapist called [facility] due to receiving a referral from Ron Vaz  Therapist explained that she has a wait list and offered to place her on the list, or to give her information about other providers in the area  [facility] stated she has transportation and her need is not urgent, and she accepted names and numbers to call for local providers  Therapist informed [facility] that she can call back to be place on wait list if she does not find another provider

## 2020-01-22 NOTE — PROGRESS NOTES
Assessment/Plan:     Diagnoses and all orders for this visit:    Encounter to establish care    Depression with anxiety  -     Ambulatory referral to Social Work; Future  -     CBC and differential; Future  -     TSH, 3rd generation with Free T4 reflex; Future  -     Comprehensive metabolic panel; Future    Vertigo    Need for HPV vaccination  -     HPV VACCINE 9 VALENT IM    Cervical cancer screening  -     Ambulatory referral to Gynecology; Future    Need for dental care  -     Ambulatory referral to Dentistry; Future    Screening for HIV without presence of risk factors  -     HIV 1/2 AG-AB combo; Future    Other orders  -     Cancel: Ambulatory referral to Psychology; Future        - Patient declines ENT or PT referrals for vertigo at this time  - Will follow up in 1 month to discuss smoking cessation  Return in about 1 month (around 2/22/2020) for follow up  Subjective:        Patient ID: Sussy Cowart is a 27 y o  female  Chief Complaint   Patient presents with   [place] [facility]     needs new PCP - hasn't seen doctor in a while    Knee Pain     occasional pain in knee   Dizziness     Some dizziness  Hx of vertigo but doesn't take meds for this anymore  Worse in right ear   Depression     used to be on medication for this but is having difficulty getting back into counseling due to insurance reasons  Shantebonifacio Dionne is a 27year old female presenting to establish care  Patient has a history of depression/anxiety  [facility]  Was previously taking zoloft but has not been on this for about 4 years  History of self harm, cutting and burning  Has not done this in over two years  States she often has thoughts of self-harm but has not acted on them  Denies HI/SI  States she does not want to begin any medications because she is afraid of gaining weight    She would however like to be referred to a therapist as she feels her depression has been worsening lately  She has been crying a lot, over-thinking things and has had decreased appetite  Reports a history of vertigo, previously on meclizine  Hasn't taken anything for this in 3-4 years  States she gets dizzy 2-3 times per week, typically occurs when she is laying down at night  Describes dizziness as the room spinning  Denies vision changes or syncopal episodes  Has never seen ENT or PT for these symptoms  Patient has not been to Gyn for about 3 years  Has not had Pap smear  Is concerned about heavy periods and cramping  Would like to be referred to GYN today  Patient currently smokes 1/2 pack per day  Would like to discuss smoking cessation at another time  The following portions of the patient's history were reviewed and updated as appropriate: allergies, current medications, past family history, past medical history, past social history, past surgical history and problem list     Patient Active Problem List   Diagnosis    Depression with anxiety    Vertigo       Current Outpatient Medications   Medication Sig Dispense Refill    diphenhydrAMINE-acetaminophen (TYLENOL PM)  MG TABS Take 1 tablet by mouth daily at bedtime as needed for sleep       No current facility-administered medications for this visit           Past Medical History:   Diagnosis Date    Depression     Insomnia     UTI (urinary tract infection)     Vertigo         Past Surgical History:   Procedure Laterality Date    WISDOM TOOTH EXTRACTION          Social History     Socioeconomic History    Marital status: Single     Spouse name: Not on file    Number of children: Not on file    Years of education: Not on file    Highest education level: Not on file   Occupational History    Not on file   Social Needs    Financial resource strain: Not on file    Food insecurity:     Worry: Not on file     Inability: Not on file    Transportation needs:     Medical: Not on file     Non-medical: Not on file   Tobacco Use    Smoking status: Current Every Day Smoker     Packs/day: 0 50     Types: Cigarettes    Smokeless tobacco: Never Used   Substance and Sexual Activity    Alcohol use: Yes     Comment: occasional    Drug use: No    Sexual activity: Not on file   Lifestyle    Physical activity:     Days per week: Not on file     Minutes per session: Not on file    Stress: Not on file   Relationships    Social connections:     Talks on phone: Not on file     Gets together: Not on file     Attends Baptism service: Not on file     Active member of club or organization: Not on file     Attends meetings of clubs or organizations: Not on file     Relationship status: Not on file    Intimate partner violence:     Fear of current or ex partner: Not on file     Emotionally abused: Not on file     Physically abused: Not on file     Forced sexual activity: Not on file   Other Topics Concern    Not on file   Social History Narrative    Not on file        Review of Systems   Constitutional: Positive for appetite change and fatigue  Negative for chills, diaphoresis and fever  HENT: Negative for congestion, ear discharge, ear pain, hearing loss, postnasal drip, rhinorrhea, sinus pressure, sinus pain, sore throat and trouble swallowing  Eyes: Negative for photophobia, pain, discharge, redness, itching and visual disturbance  Respiratory: Negative for cough, chest tightness, shortness of breath and wheezing  Cardiovascular: Negative for chest pain, palpitations and leg swelling  Gastrointestinal: Negative for abdominal pain, blood in stool, constipation, diarrhea, nausea and vomiting  Endocrine: Negative for cold intolerance, heat intolerance, polydipsia, polyphagia and polyuria  Genitourinary: Positive for menstrual problem  Negative for difficulty urinating, dysuria, frequency, hematuria, urgency, vaginal bleeding and vaginal discharge     Musculoskeletal: Positive for arthralgias  Negative for back pain, gait problem, joint swelling, neck pain and neck stiffness  Skin: Negative for rash and wound  Neurological: Positive for dizziness  Negative for seizures, syncope, weakness, light-headedness and headaches  Hematological: Negative for adenopathy  Does not bruise/bleed easily  Psychiatric/Behavioral: Positive for decreased concentration and dysphoric mood  Negative for agitation, confusion, hallucinations, self-injury, sleep disturbance and suicidal ideas  The patient is nervous/anxious  Objective:      /92 (BP Location: Left arm, Patient Position: Sitting, Cuff Size: Adult)   Pulse 80   Temp (!) 96 4 °F (35 8 °C) (Tympanic)   Resp 18   Ht 5' 3" (1 6 m)   Wt 80 3 kg (177 lb)   SpO2 98%   BMI 31 35 kg/m²          Physical Exam   Constitutional: She is oriented to person, place, and time  She appears well-developed and well-nourished  No distress  HENT:   Head: Normocephalic and atraumatic  Right Ear: External ear normal    Left Ear: External ear normal    Mouth/Throat: Oropharynx is clear and moist  No oropharyngeal exudate  Eyes: Pupils are equal, round, and reactive to light  Conjunctivae are normal  Right eye exhibits nystagmus  Left eye exhibits nystagmus  Neck: Normal range of motion  No thyromegaly present  Cardiovascular: Normal rate, regular rhythm and normal heart sounds  No murmur heard  Pulmonary/Chest: Effort normal and breath sounds normal  No respiratory distress  She has no wheezes  Abdominal: Soft  Bowel sounds are normal  There is no tenderness  Musculoskeletal: Normal range of motion  She exhibits no edema  Lymphadenopathy:     She has no cervical adenopathy  Neurological: She is alert and oriented to person, place, and time  No cranial nerve deficit  Skin: Skin is warm and dry  No rash noted  Psychiatric: She has a normal mood and affect  Her behavior is normal    Nursing note and vitals reviewed

## 2020-01-23 LAB — HIV 1+2 AB+HIV1 P24 AG SERPL QL IA: NORMAL

## 2020-07-20 ENCOUNTER — OFFICE VISIT (OUTPATIENT)
Dept: FAMILY MEDICINE CLINIC | Facility: CLINIC | Age: 25
End: 2020-07-20
Payer: COMMERCIAL

## 2020-07-20 VITALS
RESPIRATION RATE: 18 BRPM | BODY MASS INDEX: 27.29 KG/M2 | OXYGEN SATURATION: 99 % | TEMPERATURE: 96.3 F | SYSTOLIC BLOOD PRESSURE: 140 MMHG | HEART RATE: 88 BPM | HEIGHT: 63 IN | WEIGHT: 154 LBS | DIASTOLIC BLOOD PRESSURE: 78 MMHG

## 2020-07-20 DIAGNOSIS — F41.8 DEPRESSION WITH ANXIETY: ICD-10-CM

## 2020-07-20 DIAGNOSIS — R63.4 WEIGHT LOSS: ICD-10-CM

## 2020-07-20 DIAGNOSIS — K64.4 EXTERNAL HEMORRHOIDS: Primary | ICD-10-CM

## 2020-07-20 PROCEDURE — 99213 OFFICE O/P EST LOW 20 MIN: CPT | Performed by: FAMILY MEDICINE

## 2020-07-20 PROCEDURE — T1015 CLINIC SERVICE: HCPCS | Performed by: FAMILY MEDICINE

## 2020-07-20 RX ORDER — HYDROCORTISONE 25 MG/G
CREAM TOPICAL 2 TIMES DAILY
Qty: 28 G | Refills: 0 | Status: SHIPPED | OUTPATIENT
Start: 2020-07-20 | End: 2020-08-17

## 2020-07-20 NOTE — PATIENT INSTRUCTIONS
Hemorrhoids   WHAT YOU NEED TO KNOW:   Hemorrhoids are swollen blood vessels inside your rectum (internal hemorrhoids) or on your anus (external hemorrhoids)  Sometimes a hemorrhoid may prolapse  This means it extends out of your anus  DISCHARGE INSTRUCTIONS:   Return to the emergency department if:   · You have severe pain in your rectum or around your anus  · You have severe pain in your abdomen and you are vomiting  · You have bleeding from your anus that soaks through your underwear  Contact your healthcare provider if:   · You have frequent and painful bowel movements  · Your hemorrhoid looks or feels more swollen than usual      · You do not have a bowel movement for 2 days or more  · You see or feel tissue coming through your anus  · You have questions or concerns about your condition or care  Medicines: You may  need any of the following:  · A pad, cream, or ointment  can help decrease pain, swelling, and itching  · Stool softeners  help treat or prevent constipation  · NSAIDs , such as ibuprofen, help decrease swelling, pain, and fever  NSAIDs can cause stomach bleeding or kidney problems in certain people  If you take blood thinner medicine, always ask your healthcare provider if NSAIDs are safe for you  Always read the medicine label and follow directions  · Take your medicine as directed  Contact your healthcare provider if you think your medicine is not helping or if you have side effects  Tell him or her if you are allergic to any medicine  Keep a list of the medicines, vitamins, and herbs you take  Include the amounts, and when and why you take them  Bring the list or the pill bottles to follow-up visits  Carry your medicine list with you in case of an emergency  Manage your symptoms:   · Apply ice on your anus for 15 to 20 minutes every hour or as directed  Use an ice pack, or put crushed ice in a plastic bag   Cover it with a towel before you apply it to your anus  Ice helps prevent tissue damage and decreases swelling and pain  · Take a sitz bath  Fill a bathtub with 4 to 6 inches of warm water  You may also use a sitz bath pan that fits inside a toilet bowl  Sit in the sitz bath for 15 minutes  Do this 3 times a day, and after each bowel movement  The warm water can help decrease pain and swelling  · Keep your anal area clean  Gently wash the area with warm water daily  Soap may irritate the area  After a bowel movement, wipe with moist towelettes or wet toilet paper  Dry toilet paper can irritate the area  Prevent hemorrhoids:   · Do not strain to have a bowel movement  Do not sit on the toilet too long  These actions can increase pressure on the tissues in your rectum and anus  · Drink plenty of liquids  Liquids can help prevent constipation  Ask how much liquid to drink each day and which liquids are best for you  · Eat a variety of high-fiber foods  Examples include fruits, vegetables, and whole grains  Ask your healthcare provider how much fiber you need each day  You may need to take a fiber supplement  · Exercise as directed  Exercise, such as walking, may make it easier to have a bowel movement  Ask your healthcare provider to help you create an exercise plan  · Do not have anal sex  Anal sex can weaken the skin around your rectum and anus  · Avoid heavy lifting  This can cause straining and increase your risk for another hemorrhoid  Follow up with your healthcare provider as directed:  Write down your questions so you remember to ask them during your visits  © 2017 [facility] Information is for End User's use only and may not be sold, redistributed or otherwise used for commercial purposes  All illustrations and images included in CareNotes® are the copyrighted property of [employer]  or [employer]  The above information is an  only   It is not intended as medical advice for individual conditions or treatments  Talk to your doctor, nurse or pharmacist before following any medical regimen to see if it is safe and effective for you

## 2020-07-20 NOTE — PROGRESS NOTES
Assessment/Plan:    No problem-specific Assessment & Plan notes found for this encounter  Diagnoses and all orders for this visit:    External hemorrhoids  -     Ambulatory referral to Colorectal Surgery; Future  -     hydrocortisone (ANUSOL-HC) 2 5 % rectal cream; Apply topically 2 (two) times a day for 7 days    Weight loss    Depression with anxiety    Other orders  -     Doxylamine Succinate, Sleep, (SLEEP AID PO); Take by mouth daily at bedtime as needed (sleep)         -anusol cream for relief of symptoms  Also encourage OTC fiber supplement and increased water intake  Discussed with patient need for further evaluation by colorectal surgery and possibility of surgical removal due to size of hemorrhoid  She is agreeable to this plan  - discussed with patient treatment for depression which she declines at this time  We will continue to monitor her weight  Consider further workup although patient BMI is not concerning  -RTC in 3-4 weeks for annual physical     Subjective:      Patient ID: Margi Still is a 23 y o  female with a PMH of depression and anxiety who presents for evaluation of hemorrhoids  She has noticed them for the past week and they are causing significant discomfort  She has been using OTC preparation H cream without relief of symptoms  There is no bleeding associated with them  She has a history of hemorrhoids but states they have always resolved with conservative measures  No changes in bowel habits, diarrhea, or constipation  No dark or tarry stools  After review of patient's chart she has had a 20 lb weight loss over the past 6 months  She notes she has been trying to loss weight but also feels not hungry as her "nerves are shot" over stresses over a previous boyfriend  She notes she is eating about 1-2 meals per day but sometimes not at all  Her BMI is 27 28  Denies abdominal pain, nausea, vomiting  She is not on an anti-depressant   She was previously interesting in establishing with a counselor, however, due to Robert there is a waitlist for our provider and she has been having trouble finding a place that takes her insurance  She is not currently interested in establsihing  No SI/HI  HPI    The following portions of the patient's history were reviewed and updated as appropriate: allergies, current medications, past family history, past medical history, past social history, past surgical history and problem list     Review of Systems   Constitutional: Positive for appetite change  Negative for activity change, chills, diaphoresis, fever and unexpected weight change  HENT: Negative  Eyes: Negative  Respiratory: Negative  Cardiovascular: Negative  Gastrointestinal: Negative  Endocrine: Negative  Genitourinary: Negative  Musculoskeletal: Negative  Skin: Negative  Allergic/Immunologic: Negative  Neurological: Negative  Hematological: Negative  Psychiatric/Behavioral: Positive for dysphoric mood  Negative for decreased concentration, hallucinations, self-injury, sleep disturbance and suicidal ideas  The patient is not hyperactive  Objective:      /78 (BP Location: Right arm, Patient Position: Sitting, Cuff Size: Adult)   Pulse 88   Temp (!) 96 3 °F (35 7 °C) (Tympanic)   Resp 18   Ht 5' 3" (1 6 m)   Wt 69 9 kg (154 lb)   SpO2 99%   BMI 27 28 kg/m²          Physical Exam   Constitutional: She is oriented to person, place, and time  She appears well-developed and well-nourished  No distress  HENT:   Head: Normocephalic and atraumatic  Eyes: Pupils are equal, round, and reactive to light  EOM are normal    Neck: Normal range of motion  Neck supple  Cardiovascular: Normal rate, regular rhythm, normal heart sounds and intact distal pulses  No murmur heard  Pulmonary/Chest: Effort normal and breath sounds normal  No respiratory distress  She has no wheezes  Abdominal: Soft   Normal appearance and bowel sounds are normal  She exhibits no distension  There is no tenderness  Genitourinary:   Genitourinary Comments: Large, 3cm non-thrombosed external hemorrhoid on left lateral side  Digital exam deferred due to patient discomfort  Musculoskeletal: Normal range of motion  Neurological: She is alert and oriented to person, place, and time  Skin: Skin is warm and dry  Capillary refill takes less than 2 seconds  Psychiatric: She has a normal mood and affect  Her behavior is normal  Judgment and thought content normal    Vitals reviewed

## 2020-08-10 ENCOUNTER — CONSULT (OUTPATIENT)
Dept: SURGERY | Facility: CLINIC | Age: 25
End: 2020-08-10
Payer: COMMERCIAL

## 2020-08-10 VITALS
HEART RATE: 86 BPM | TEMPERATURE: 99.2 F | SYSTOLIC BLOOD PRESSURE: 169 MMHG | WEIGHT: 153 LBS | HEIGHT: 63 IN | BODY MASS INDEX: 27.11 KG/M2 | DIASTOLIC BLOOD PRESSURE: 105 MMHG

## 2020-08-10 DIAGNOSIS — K64.9 HEMORRHOIDS: Primary | ICD-10-CM

## 2020-08-10 DIAGNOSIS — K64.4 EXTERNAL HEMORRHOIDS: ICD-10-CM

## 2020-08-10 PROCEDURE — 99243 OFF/OP CNSLTJ NEW/EST LOW 30: CPT | Performed by: SURGERY

## 2020-08-10 PROCEDURE — 46600 DIAGNOSTIC ANOSCOPY SPX: CPT | Performed by: SURGERY

## 2020-08-10 NOTE — PROGRESS NOTES
Assessment/Plan/Follow up information       Diagnosis ICD-10-CM Associated Orders   1  Hemorrhoids  K64 9     Begin taking Metamucil daily  Follow-up in 6 weeks             All recent lab work and imagining reviewed on today's visit with patient, appropriate follow up was initiated if needed  Patient was counseled/education regarding their diagnosis, and the associated plan  They agreed with plan, all questions and concerns were answered/addressed  Advised to contact me or the office with any concerns or questions  In the event of an emergency, and unable to contact a provider they are to go to the emergency room  Subjective    HPI:  51-year-old female presents at the referral of her PCP for external hemorrhoid  Patient states she has a long history of constipation, straining while trying to have a bowel movement, and irregular bowel movements approximately 1 time every week  For the past couple of weeks states that she has felt a mass in her anal region that occasionally feels like it is sliding out  Was evaluated by her PCP approximately 1 month ago noted a large 3 cm non thrombosed hemorrhoid, however due to patient discomfort internal exam was not performed at that time  Patient states she does not have a history hemorrhoids that she knows of, however does believe that that is because of her issues today  She was given a prescription for Anusol cream by her PCP with moderate resolution of symptoms  Review of Systems   Constitutional: Negative for activity change, appetite change, chills, fatigue and fever  HENT: Negative for congestion, dental problem, drooling, ear discharge, ear pain, facial swelling, postnasal drip, rhinorrhea and sinus pain  Eyes: Negative for photophobia, pain, discharge and itching  Respiratory: Negative for apnea, cough, chest tightness and shortness of breath  Cardiovascular: Negative for chest pain and leg swelling     Gastrointestinal: Positive for rectal pain  Negative for abdominal distention, abdominal pain, anal bleeding, constipation, diarrhea and nausea  Endocrine: Negative for cold intolerance, heat intolerance and polydipsia  Genitourinary: Negative for difficulty urinating  Musculoskeletal: Negative for arthralgias, gait problem, joint swelling and myalgias  Skin: Negative for color change and pallor  Allergic/Immunologic: Negative for immunocompromised state  Neurological: Negative for dizziness, seizures, facial asymmetry, weakness, light-headedness, numbness and headaches  Psychiatric/Behavioral: Negative for agitation, behavioral problems, confusion, decreased concentration and dysphoric mood  Objective    Vitals:    08/10/20 1442   BP: (!) 169/105   Pulse: 86   Temp: 99 2 °F (37 3 °C)     Lower Endoscopy    Date/Time: 8/10/2020 3:20 PM  Performed by: Silvia Chauhan MD  Authorized by: Silvia Chauhan MD     Verbal consent obtained?: Yes    Written consent obtained?: No    Risks and benefits: Risks, benefits and alternatives were discussed    Consent given by:  Patient  Patient states understanding of procedure being performed: Yes    Patient's understanding of procedure matches consent: Yes    Procedure consent matches procedure scheduled: Yes    Relevant documents present and verified: Yes    Test results available and properly labeled: Yes    Site marked: Yes    Radiology Images displayed and confirmed  If images not available, report reviewed: Yes    Patient identity confirmed:  Verbally with patient  Indications: change in bowel habit, rectal mass and rectal pain    Patient sedated: No    Scope type:   Anoscope  External exam performed: Yes    Perianal skin tags: Yes    External hemorrhoids: Yes    Digital exam performed: Yes    Internal hemorrhoids: Yes    Procedure termination:  Procedure complete   Small skin tag noted in the exterior anterior  midline  Resolving left lateral external hemorrhoid noted         Physical Exam  Vitals signs and nursing note reviewed  Exam conducted with a chaperone present  Constitutional:       General: She is not in acute distress  Appearance: She is well-developed  HENT:      Head: Normocephalic and atraumatic  Eyes:      Conjunctiva/sclera: Conjunctivae normal       Pupils: Pupils are equal, round, and reactive to light  Neck:      Musculoskeletal: Normal range of motion and neck supple  Cardiovascular:      Rate and Rhythm: Normal rate and regular rhythm  Heart sounds: Normal heart sounds  No murmur  No friction rub  Pulmonary:      Effort: Pulmonary effort is normal       Breath sounds: Normal breath sounds  Abdominal:      General: Bowel sounds are normal       Palpations: Abdomen is soft  Genitourinary:     Comments: Please see below anoscope exam  Musculoskeletal: Normal range of motion  Skin:     General: Skin is warm  Capillary Refill: Capillary refill takes less than 2 seconds  Neurological:      Mental Status: She is alert and oriented to person, place, and time  Motor: No abnormal muscle tone  Coordination: Coordination normal    Psychiatric:         Behavior: Behavior normal          Thought Content: Thought content normal             Portions of the record may have been created with voice recognition software  Occasional wrong word or "sound a like" substitutions may have occurred due to the inherent limitations of voice recognition software  Read the chart carefully and recognize, using context, where substitutions have occurred  Contact me with any questions         Steven Duverney, MD 08/10/20

## 2020-08-17 ENCOUNTER — OFFICE VISIT (OUTPATIENT)
Dept: FAMILY MEDICINE CLINIC | Facility: CLINIC | Age: 25
End: 2020-08-17
Payer: COMMERCIAL

## 2020-08-17 VITALS
OXYGEN SATURATION: 99 % | TEMPERATURE: 97.2 F | HEART RATE: 88 BPM | RESPIRATION RATE: 20 BRPM | SYSTOLIC BLOOD PRESSURE: 142 MMHG | BODY MASS INDEX: 27.14 KG/M2 | WEIGHT: 153.2 LBS | HEIGHT: 63 IN | DIASTOLIC BLOOD PRESSURE: 88 MMHG

## 2020-08-17 DIAGNOSIS — Z00.00 ANNUAL PHYSICAL EXAM: Primary | ICD-10-CM

## 2020-08-17 DIAGNOSIS — F17.200 TOBACCO DEPENDENCE: ICD-10-CM

## 2020-08-17 DIAGNOSIS — Z12.4 SCREENING FOR CERVICAL CANCER: ICD-10-CM

## 2020-08-17 DIAGNOSIS — Z23 ENCOUNTER FOR IMMUNIZATION: ICD-10-CM

## 2020-08-17 PROCEDURE — T1015 CLINIC SERVICE: HCPCS | Performed by: FAMILY MEDICINE

## 2020-08-17 PROCEDURE — 3008F BODY MASS INDEX DOCD: CPT | Performed by: SURGERY

## 2020-08-17 PROCEDURE — 99395 PREV VISIT EST AGE 18-39: CPT | Performed by: FAMILY MEDICINE

## 2020-08-17 NOTE — PROGRESS NOTES
ADULT ANNUAL PHYSICAL  [facility]    NAME: Bell Fernandez  AGE: 23 y o  SEX: female  : 1995     DATE: 2020     Assessment and Plan:     Problem List Items Addressed This Visit     None      Visit Diagnoses     Annual physical exam    -  Primary    Screening for cervical cancer        Relevant Orders    Ambulatory referral to Obstetrics / Gynecology    Encounter for immunization        BMI 27 0-27 9,adult        Tobacco dependence              Immunizations and preventive care screenings were discussed with patient today  Appropriate education was printed on patient's after visit summary  Patient is due for HPV vaccine, however, she declines today as she states she is having difficulty with her insurance covering it and she received a bill for the last injection  She will call her insurance and then call the office to schedule her second HPV  Counseling:  Alcohol/drug use: discussed moderation in alcohol intake, the recommendations for healthy alcohol use, and avoidance of illicit drug use  Dental Health: discussed importance of regular tooth brushing, flossing, and dental visits  Injury prevention: discussed safety/seat belts, safety helmets, smoke detectors, carbon dioxide detectors, and smoking near bedding or upholstery  Sexual health: discussed sexually transmitted diseases, partner selection, use of condoms, avoidance of unintended pregnancy, and contraceptive alternatives  · Exercise: the importance of regular exercise/physical activity was discussed  Recommend exercise 3-5 times per week for at least 30 minutes  BMI Counseling: Body mass index is 27 14 kg/m²   The BMI is above normal  Nutrition recommendations include decreasing portion sizes, encouraging healthy choices of fruits and vegetables, decreasing fast food intake, consuming healthier snacks, limiting drinks that contain sugar, moderation in carbohydrate intake, increasing intake of lean protein, reducing intake of saturated and trans fat and reducing intake of cholesterol  Exercise recommendations include moderate physical activity 150 minutes/week, vigorous physical activity 75 minutes/week, exercising 3-5 times per week, obtaining a gym membership and strength training exercises  No pharmacotherapy was ordered  Return in 1 year (on 8/17/2021)  Chief Complaint:     Chief Complaint   Patient presents with    Physical Exam     yearly physical, no concerns or problems at this time       History of Present Illness:     Adult Annual Physical   Patient here for a comprehensive physical exam  The patient reports no problems  Diet and Physical Activity  · Diet/Nutrition: poor diet, limited fruits/vegetables and adequate fiber intake  · Exercise: walking, 5-7 times a week on average and less than 30 minutes on average  Depression Screening  PHQ-9 Depression Screening    PHQ-9:    Frequency of the following problems over the past two weeks:            General Health  · Sleep: sleeps well and gets 7-8 hours of sleep on average  · Hearing: normal - left  · Vision: goes for regular eye exams, most recent eye exam >1 year ago, wears glasses and vision is good with glasses  · Dental: regular dental visits and brushes teeth twice daily  /GYN Health  · Last menstrual period:  8/7/2020 regular periods, no concerns   · Contraceptive method: barrier methods  Not currently sexually active   · History of STDs?: no      Review of Systems:     Review of Systems   Constitutional: Negative  HENT: Negative  Eyes: Negative  Respiratory: Negative  Cardiovascular: Negative  Gastrointestinal: Negative  Endocrine: Negative  Genitourinary: Negative  Musculoskeletal: Negative  Skin: Negative  Allergic/Immunologic: Negative  Neurological: Negative  Hematological: Negative  Psychiatric/Behavioral: Negative         Past Medical History:     Past Medical History:   Diagnosis Date    Depression     Insomnia     UTI (urinary tract infection)     Vertigo       Past Surgical History:     Past Surgical History:   Procedure Laterality Date    WISDOM TOOTH EXTRACTION        Social History:        Social History     Socioeconomic History    Marital status: Single     Spouse name: Not on file    Number of children: Not on file    Years of education: Not on file    Highest education level: Not on file   Occupational History    Not on file   Social Needs    Financial resource strain: Not on file    Food insecurity     Worry: Not on file     Inability: Not on file    Transportation needs     Medical: Not on file     Non-medical: Not on file   Tobacco Use    Smoking status: Current Every Day Smoker     Packs/day: 0 50     Types: Cigarettes    Smokeless tobacco: Never Used    Tobacco comment: trying to cut back   Substance and Sexual Activity    Alcohol use: Yes     Comment: occasional    Drug use: No    Sexual activity: Not on file   Lifestyle    Physical activity     Days per week: Not on file     Minutes per session: Not on file    Stress: Not on file   Relationships    Social connections     Talks on phone: Not on file     Gets together: Not on file     Attends [employer] service: Not on file     Active member of club or organization: Not on file     Attends meetings of clubs or organizations: Not on file     Relationship status: Not on file    Intimate partner violence     Fear of current or ex partner: Not on file     Emotionally abused: Not on file     Physically abused: Not on file     Forced sexual activity: Not on file   Other Topics Concern    Not on file   Social History Narrative    Not on file      Family History:     Family History   Problem Relation Age of Onset    Heart disease Father     Cancer Paternal Grandmother         breast      Current Medications:     Current Outpatient Medications   Medication Sig Dispense Refill    Doxylamine Succinate, Sleep, (SLEEP AID PO) Take by mouth daily at bedtime as needed (sleep)        No current facility-administered medications for this visit  Allergies:     No Known Allergies   Physical Exam:     /88 (Patient Position: Sitting)   Pulse 88   Temp (!) 97 2 °F (36 2 °C) (Tympanic)   Resp 20   Ht 5' 3" (1 6 m)   Wt 69 5 kg (153 lb 3 2 oz)   SpO2 99%   BMI 27 14 kg/m²     Physical Exam  Vitals signs reviewed  Constitutional:       General: She is not in acute distress  Appearance: Normal appearance  She is normal weight  HENT:      Head: Normocephalic and atraumatic  Right Ear: Tympanic membrane, ear canal and external ear normal       Left Ear: Tympanic membrane, ear canal and external ear normal       Nose: Nose normal       Mouth/Throat:      Mouth: Mucous membranes are moist    Eyes:      Extraocular Movements: Extraocular movements intact  Conjunctiva/sclera: Conjunctivae normal       Pupils: Pupils are equal, round, and reactive to light  Neck:      Musculoskeletal: Normal range of motion and neck supple  Cardiovascular:      Rate and Rhythm: Normal rate and regular rhythm  Pulses: Normal pulses  Heart sounds: Normal heart sounds  No murmur  No friction rub  Pulmonary:      Effort: Pulmonary effort is normal  No respiratory distress  Breath sounds: Normal breath sounds  Abdominal:      General: Abdomen is flat  Bowel sounds are normal  There is no distension  Palpations: Abdomen is soft  Tenderness: There is no abdominal tenderness  Musculoskeletal: Normal range of motion  Skin:     General: Skin is warm  Capillary Refill: Capillary refill takes less than 2 seconds  Neurological:      General: No focal deficit present  Mental Status: She is alert and oriented to person, place, and time  Mental status is at baseline  Cranial Nerves: No cranial nerve deficit        Sensory: No sensory deficit  Motor: No weakness  Coordination: Coordination normal       Gait: Gait normal       Deep Tendon Reflexes: Reflexes normal    Psychiatric:         Mood and Affect: Mood normal          Behavior: Behavior normal          Thought Content:  Thought content normal          Judgment: Judgment normal           Ghada Barragan PA-C   [address]

## 2020-08-17 NOTE — PATIENT INSTRUCTIONS
Wellness Visit for Adults   AMBULATORY CARE:   A wellness visit  is when you see your healthcare provider to get screened for health problems  You can also get advice on how to stay healthy  Write down your questions so you remember to ask them  Ask your healthcare provider how often you should have a wellness visit  What happens at a wellness visit:  Your healthcare provider will ask about your health, and your family history of health problems  This includes high blood pressure, heart disease, and cancer  He or she will ask if you have symptoms that concern you, if you smoke, and about your mood  You may also be asked about your intake of medicines, supplements, food, and alcohol  Any of the following may be done:  · Your weight  will be checked  Your height may also be checked so your body mass index (BMI) can be calculated  Your BMI shows if you are at a healthy weight  · Your blood pressure  and heart rate will be checked  Your temperature may also be checked  · Blood and urine tests  may be done  Blood tests may be done to check your cholesterol levels  Abnormal cholesterol levels increase your risk for heart disease and stroke  You may also need a blood or urine test to check for diabetes if you are at increased risk  Urine tests may be done to look for signs of an infection or kidney disease  · A physical exam  includes checking your heartbeat and lungs with a stethoscope  Your healthcare provider may also check your skin to look for sun damage  · Screening tests  may be recommended  A screening test is done to check for diseases that may not cause symptoms  The screening tests you may need depend on your age, gender, family history, and lifestyle habits  For example, colorectal screening may be recommended if you are 48years old or older  Screening tests you need if you are a woman:   · A Pap smear  is used to screen for cervical cancer   Pap smears are usually done every 3 to 5 years depending on your age  You may need them more often if you have had abnormal Pap smear test results in the past  Ask your healthcare provider how often you should have a Pap smear  · A mammogram  is an x-ray of your breasts to screen for breast cancer  Experts recommend mammograms every 2 years starting at age 48 years  You may need a mammogram at age 52 years or younger if you have an increased risk for breast cancer  Talk to your healthcare provider about when you should start having mammograms and how often you need them  Vaccines you may need:   · Get an influenza vaccine  every year  The influenza vaccine protects you from the flu  Several types of viruses cause the flu  The viruses change over time, so new vaccines are made each year  · Get a tetanus-diphtheria (Td) booster vaccine  every 10 years  This vaccine protects you against tetanus and diphtheria  Tetanus is a severe infection that may cause painful muscle spasms and lockjaw  Diphtheria is a severe bacterial infection that causes a thick covering in the back of your mouth and throat  · Get a human papillomavirus (HPV) vaccine  if you are female and aged 23 to 32 or male 23 to 24 and never received it  This vaccine protects you from HPV infection  HPV is the most common infection spread by sexual contact  HPV may also cause vaginal, penile, and anal cancers  · Get a pneumococcal vaccine  if you are aged 72 years or older  The pneumococcal vaccine is an injection given to protect you from pneumococcal disease  Pneumococcal disease is an infection caused by pneumococcal bacteria  The infection may cause pneumonia, meningitis, or an ear infection  · Get a shingles vaccine  if you are aged 61 or older, even if you have had shingles before  The shingles vaccine is an injection to protect you from the varicella-zoster virus  This is the same virus that causes chickenpox   Shingles is a painful rash that develops in people who had chickenpox or have been exposed to the virus  How to eat healthy:  My Plate is a model for planning healthy meals  It shows the types and amounts of foods that should go on your plate  Fruits and vegetables make up about half of your plate, and grains and protein make up the other half  A serving of dairy is included on the side of your plate  The amount of calories and serving sizes you need depends on your age, gender, weight, and height  Examples of healthy foods are listed below:  · Eat a variety of vegetables  such as dark green, red, and orange vegetables  You can also include canned vegetables low in sodium (salt) and frozen vegetables without added butter or sauces  · Eat a variety of fresh fruits , canned fruit in 100% juice, frozen fruit, and dried fruit  · Include whole grains  At least half of the grains you eat should be whole grains  Examples include whole-wheat bread, wheat pasta, brown rice, and whole-grain cereals such as oatmeal     · Eat a variety of protein foods such as seafood (fish and shellfish), lean meat, and poultry without skin (turkey and chicken)  Examples of lean meats include pork leg, shoulder, or tenderloin, and beef round, sirloin, tenderloin, and extra lean ground beef  Other protein foods include eggs and egg substitutes, beans, peas, soy products, nuts, and seeds  · Choose low-fat dairy products such as skim or 1% milk or low-fat yogurt, cheese, and cottage cheese  · Limit unhealthy fats  such as butter, hard margarine, and shortening  Exercise:  Exercise at least 30 minutes per day on most days of the week  Some examples of exercise include walking, biking, dancing, and swimming  You can also fit in more physical activity by taking the stairs instead of the elevator or parking farther away from stores  Include muscle strengthening activities 2 days each week  Regular exercise provides many health benefits   It helps you manage your weight, and decreases your risk for type 2 diabetes, heart disease, stroke, and high blood pressure  Exercise can also help improve your mood  Ask your healthcare provider about the best exercise plan for you  General health and safety guidelines:   · Do not smoke  Nicotine and other chemicals in cigarettes and cigars can cause lung damage  Ask your healthcare provider for information if you currently smoke and need help to quit  E-cigarettes or smokeless tobacco still contain nicotine  Talk to your healthcare provider before you use these products  · Limit alcohol  A drink of alcohol is 12 ounces of beer, 5 ounces of wine, or 1½ ounces of liquor  · Lose weight, if needed  Being overweight increases your risk of certain health conditions  These include heart disease, high blood pressure, type 2 diabetes, and certain types of cancer  · Protect your skin  Do not sunbathe or use tanning beds  Use sunscreen with a SPF 15 or higher  Apply sunscreen at least 15 minutes before you go outside  Reapply sunscreen every 2 hours  Wear protective clothing, hats, and sunglasses when you are outside  · Drive safely  Always wear your seatbelt  Make sure everyone in your car wears a seatbelt  A seatbelt can save your life if you are in an accident  Do not use your cell phone when you are driving  This could distract you and cause an accident  Pull over if you need to make a call or send a text message  · Practice safe sex  Use latex condoms if are sexually active and have more than one partner  Your healthcare provider may recommend screening tests for sexually transmitted infections (STIs)  · Wear helmets, lifejackets, and protective gear  Always wear a helmet when you ride a bike or motorcycle, go skiing, or play sports that could cause a head injury  Wear protective equipment when you play sports  Wear a lifejacket when you are on a boat or doing water sports    © 2017 [address] Information is for End User's use only and may not be sold, redistributed or otherwise used for commercial purposes  All illustrations and images included in CareNotes® are the copyrighted property of [employer]  or [employer]  The above information is an  only  It is not intended as medical advice for individual conditions or treatments  Talk to your doctor, nurse or pharmacist before following any medical regimen to see if it is safe and effective for you  Weight Management   AMBULATORY CARE:   Why it is important to manage your weight:  Being overweight increases your risk of health conditions such as heart disease, high blood pressure, type 2 diabetes, and certain types of cancer  It can also increase your risk for osteoarthritis, sleep apnea, and other respiratory problems  Aim for a slow, steady weight loss  Even a small amount of weight loss can lower your risk of health problems  How to lose weight safely:  A safe and healthy way to lose weight is to eat fewer calories and get regular exercise  You can lose up about 1 pound a week by decreasing the number of calories you eat by 500 calories each day  You can decrease calories by eating smaller portion sizes or by cutting out high-calorie foods  Read labels to find out how many calories are in the foods you eat  You can also burn calories with exercise such as walking, swimming, or biking  You will be more likely to keep weight off if you make these changes part of your lifestyle  Healthy meal plan for weight management:  A healthy meal plan includes a variety of foods, contains fewer calories, and helps you stay healthy  A healthy meal plan includes the following:  · Eat whole-grain foods more often  A healthy meal plan should contain fiber  Fiber is the part of grains, fruits, and vegetables that is not broken down by your body  Whole-grain foods are healthy and provide extra fiber in your diet   Some examples of whole-grain foods are whole-wheat breads and pastas, oatmeal, brown rice, and bulgur  · Eat a variety of vegetables every day  Include dark, leafy greens such as spinach, kale, kuldip greens, and mustard greens  Eat yellow and orange vegetables such as carrots, sweet potatoes, and winter squash  · Eat a variety of fruits every day  Choose fresh or canned fruit (canned in its own juice or light syrup) instead of juice  Fruit juice has very little or no fiber  · Eat low-fat dairy foods  Drink fat-free (skim) milk or 1% milk  Eat fat-free yogurt and low-fat cottage cheese  Try low-fat cheeses such as mozzarella and other reduced-fat cheeses  · Choose meat and other protein foods that are low in fat  Choose beans or other legumes such as split peas or lentils  Choose fish, skinless poultry (chicken or turkey), or lean cuts of red meat (beef or pork)  Before you cook meat or poultry, cut off any visible fat  · Use less fat and oil  Try baking foods instead of frying them  Add less fat, such as margarine, sour cream, regular salad dressing and mayonnaise to foods  Eat fewer high-fat foods  Some examples of high-fat foods include french fries, doughnuts, ice cream, and cakes  · Eat fewer sweets  Limit foods and drinks that are high in sugar  This includes candy, cookies, regular soda, and sweetened drinks  Ways to decrease calories:   · Eat smaller portions  ¨ Use a small plate with smaller servings  ¨ Do not eat second helpings  ¨ When you eat at a restaurant, ask for a box and place half of your meal in the box before you eat  ¨ Share an entrée with someone else  · Replace high-calorie snacks with healthy, low-calorie snacks  ¨ Choose fresh fruit, vegetables, fat-free rice cakes, or air-popped popcorn instead of potato chips, nuts, or chocolate  ¨ Choose water or calorie-free drinks instead of soda or sweetened drinks  · Eat regular meals  Skipping meals can lead to overeating later in the day   Eat a healthy snack in place of a meal if you do not have time to eat a regular meal      · Do not shop for groceries when you are hungry  You may be more likely to make unhealthy food choices  Take a grocery list of healthy foods and shop after you have eaten  Exercise:  Exercise at least 30 minutes per day on most days of the week  Some examples of exercise include walking, biking, dancing, and swimming  You can also fit in more physical activity by taking the stairs instead of the elevator or parking farther away from stores  Ask your healthcare provider about the best exercise plan for you  Other things to consider as you try to lose weight:   · Be aware of situations that may give you the urge to overeat, such as eating while watching television  Find ways to avoid these situations  For example, read a book, go for a walk, or do crafts  · Meet with a weight loss support group or friends who are also trying to lose weight  This may help you stay motivated to continue working on your weight loss goals  © 2017 [address] Information is for End User's use only and may not be sold, redistributed or otherwise used for commercial purposes  All illustrations and images included in CareNotes® are the copyrighted property of [employer]  or [employer]  The above information is an  only  It is not intended as medical advice for individual conditions or treatments  Talk to your doctor, nurse or pharmacist before following any medical regimen to see if it is safe and effective for you  Cigarette Smoking and Your Health   AMBULATORY CARE:   Risks to your health if you smoke:  Nicotine and other chemicals found in tobacco damage every cell in your body  Even if you are a light smoker, you have an increased risk for cancer, heart disease, and lung disease  If you are pregnant or have diabetes, smoking increases your risk for complications     Benefits to your health if you stop smoking:   · You decrease respiratory symptoms such as coughing, wheezing, and shortness of breath  · You reduce your risk for cancers of the lung, mouth, throat, kidney, bladder, pancreas, stomach, and cervix  If you already have cancer, you increase the benefits of chemotherapy  You also reduce your risk for cancer returning or a second cancer from developing  · You reduce your risk for heart disease, blood clots, heart attack, and stroke  · You reduce your risk for lung infections, and diseases such as pneumonia, asthma, chronic bronchitis, and emphysema  · Your circulation improves  More oxygen can be delivered to your body  If you have diabetes, you lower your risk for complications, such as kidney, artery, and eye diseases  You also lower your risk for nerve damage  Nerve damage can lead to amputations, poor vision, and blindness  · You improve your body's ability to heal and to fight infections  Benefits to the health of others if you stop smoking:  Tobacco is harmful to nonsmokers who breathe in your secondhand smoke  The following are ways the health of others around you may improve when you stop smoking:  · You lower the risks for lung cancer and heart disease in nonsmoking adults  · If you are pregnant, you lower the risk for miscarriage, early delivery, low birth weight, and stillbirth  You also lower your baby's risk for SIDS, obesity, developmental delay, and neurobehavioral problems, such as ADHD  · If you have children, you lower their risk for ear infections, colds, pneumonia, bronchitis, and asthma  For more information and support to stop smoking:   · Smokefree  gov  Phone: 5- 817 - 997-5390  Web Address: www [employer]  Follow up with your healthcare provider as directed:  Write down your questions so you remember to ask them during your visits     © 2017 [employer] Information is for End User's use only and may not be sold, redistributed or otherwise used for commercial purposes  All illustrations and images included in CareNotes® are the copyrighted property of [employer]  or [employer]  The above information is an  only  It is not intended as medical advice for individual conditions or treatments  Talk to your doctor, nurse or pharmacist before following any medical regimen to see if it is safe and effective for you

## 2020-10-26 ENCOUNTER — OFFICE VISIT (OUTPATIENT)
Dept: SURGERY | Facility: CLINIC | Age: 25
End: 2020-10-26
Payer: COMMERCIAL

## 2020-10-26 VITALS
SYSTOLIC BLOOD PRESSURE: 120 MMHG | HEIGHT: 63 IN | HEART RATE: 78 BPM | BODY MASS INDEX: 27.11 KG/M2 | DIASTOLIC BLOOD PRESSURE: 93 MMHG | TEMPERATURE: 98 F | WEIGHT: 153 LBS

## 2020-10-26 DIAGNOSIS — K64.4 RESIDUAL HEMORRHOIDAL SKIN TAGS: Primary | ICD-10-CM

## 2020-10-26 PROCEDURE — 99212 OFFICE O/P EST SF 10 MIN: CPT | Performed by: SURGERY

## 2020-11-01 PROBLEM — K64.4 RESIDUAL HEMORRHOIDAL SKIN TAGS: Status: ACTIVE | Noted: 2020-11-01

## 2020-11-02 ENCOUNTER — APPOINTMENT (EMERGENCY)
Dept: CT IMAGING | Facility: HOSPITAL | Age: 25
End: 2020-11-02
Payer: COMMERCIAL

## 2020-11-02 ENCOUNTER — HOSPITAL ENCOUNTER (INPATIENT)
Facility: HOSPITAL | Age: 25
LOS: 4 days | Discharge: HOME | DRG: 751 | End: 2020-11-06
Attending: STUDENT IN AN ORGANIZED HEALTH CARE EDUCATION/TRAINING PROGRAM | Admitting: STUDENT IN AN ORGANIZED HEALTH CARE EDUCATION/TRAINING PROGRAM
Payer: COMMERCIAL

## 2020-11-02 ENCOUNTER — HOSPITAL ENCOUNTER (EMERGENCY)
Facility: HOSPITAL | Age: 25
End: 2020-11-02
Attending: EMERGENCY MEDICINE | Admitting: EMERGENCY MEDICINE
Payer: COMMERCIAL

## 2020-11-02 VITALS
WEIGHT: 144.62 LBS | OXYGEN SATURATION: 94 % | TEMPERATURE: 98.3 F | DIASTOLIC BLOOD PRESSURE: 74 MMHG | SYSTOLIC BLOOD PRESSURE: 126 MMHG | BODY MASS INDEX: 25.62 KG/M2 | HEART RATE: 59 BPM | RESPIRATION RATE: 18 BRPM

## 2020-11-02 DIAGNOSIS — R45.851 SUICIDAL IDEATION: ICD-10-CM

## 2020-11-02 DIAGNOSIS — F41.8 DEPRESSION WITH ANXIETY: ICD-10-CM

## 2020-11-02 DIAGNOSIS — R42 DIZZINESS: ICD-10-CM

## 2020-11-02 DIAGNOSIS — R51.9 HEADACHE: ICD-10-CM

## 2020-11-02 DIAGNOSIS — S09.90XA CLOSED HEAD INJURY, INITIAL ENCOUNTER: ICD-10-CM

## 2020-11-02 DIAGNOSIS — R45.851 DEPRESSION WITH SUICIDAL IDEATION: ICD-10-CM
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
## 2020-11-02 DIAGNOSIS — T50.902A OVERDOSE, INTENTIONAL SELF-HARM, INITIAL ENCOUNTER (HCC): Primary | ICD-10-CM

## 2020-11-02 DIAGNOSIS — F32.A DEPRESSION WITH SUICIDAL IDEATION: ICD-10-CM

## 2020-11-02 LAB
ALBUMIN SERPL BCP-MCNC: 3.9 G/DL (ref 3.5–5)
ALP SERPL-CCNC: 39 U/L (ref 46–116)
ALT SERPL W P-5'-P-CCNC: 32 U/L (ref 12–78)
AMPHETAMINES SERPL QL SCN: NEGATIVE
ANION GAP SERPL CALCULATED.3IONS-SCNC: 11 MMOL/L (ref 4–13)
APAP SERPL-MCNC: <2 UG/ML (ref 10–20)
AST SERPL W P-5'-P-CCNC: 24 U/L (ref 5–45)
ATRIAL RATE: 64 BPM
BARBITURATES UR QL: NEGATIVE
BASOPHILS # BLD AUTO: 0.02 THOUSANDS/ΜL (ref 0–0.1)
BASOPHILS NFR BLD AUTO: 0 % (ref 0–1)
BENZODIAZ UR QL: NEGATIVE
BILIRUB SERPL-MCNC: 0.4 MG/DL (ref 0.2–1)
BUN SERPL-MCNC: 7 MG/DL (ref 5–25)
CALCIUM SERPL-MCNC: 8.6 MG/DL (ref 8.3–10.1)
CHLORIDE SERPL-SCNC: 103 MMOL/L (ref 100–108)
CO2 SERPL-SCNC: 25 MMOL/L (ref 21–32)
COCAINE UR QL: NEGATIVE
CREAT SERPL-MCNC: 0.75 MG/DL (ref 0.6–1.3)
EOSINOPHIL # BLD AUTO: 0.15 THOUSAND/ΜL (ref 0–0.61)
EOSINOPHIL NFR BLD AUTO: 2 % (ref 0–6)
ERYTHROCYTE [DISTWIDTH] IN BLOOD BY AUTOMATED COUNT: 15.5 % (ref 11.6–15.1)
ETHANOL SERPL-MCNC: <3 MG/DL (ref 0–3)
EXT PREG TEST URINE: NEGATIVE
EXT. CONTROL ED NAV: NORMAL
GFR SERPL CREATININE-BSD FRML MDRD: 113 ML/MIN/1.73SQ M
GLUCOSE SERPL-MCNC: 91 MG/DL (ref 65–140)
HCT VFR BLD AUTO: 32.4 % (ref 34.8–46.1)
HGB BLD-MCNC: 10.1 G/DL (ref 11.5–15.4)
IMM GRANULOCYTES # BLD AUTO: 0.02 THOUSAND/UL (ref 0–0.2)
IMM GRANULOCYTES NFR BLD AUTO: 0 % (ref 0–2)
LYMPHOCYTES # BLD AUTO: 2.98 THOUSANDS/ΜL (ref 0.6–4.47)
LYMPHOCYTES NFR BLD AUTO: 40 % (ref 14–44)
MCH RBC QN AUTO: 25.5 PG (ref 26.8–34.3)
MCHC RBC AUTO-ENTMCNC: 31.2 G/DL (ref 31.4–37.4)
MCV RBC AUTO: 82 FL (ref 82–98)
METHADONE UR QL: NEGATIVE
MONOCYTES # BLD AUTO: 0.48 THOUSAND/ΜL (ref 0.17–1.22)
MONOCYTES NFR BLD AUTO: 6 % (ref 4–12)
NEUTROPHILS # BLD AUTO: 3.82 THOUSANDS/ΜL (ref 1.85–7.62)
NEUTS SEG NFR BLD AUTO: 52 % (ref 43–75)
NRBC BLD AUTO-RTO: 0 /100 WBCS
OPIATES UR QL SCN: NEGATIVE
OXYCODONE+OXYMORPHONE UR QL SCN: NEGATIVE
P AXIS: 66 DEGREES
PCP UR QL: NEGATIVE
PLATELET # BLD AUTO: 249 THOUSANDS/UL (ref 149–390)
PMV BLD AUTO: 10.1 FL (ref 8.9–12.7)
POTASSIUM SERPL-SCNC: 2.9 MMOL/L (ref 3.5–5.3)
PR INTERVAL: 140 MS
PROT SERPL-MCNC: 7.1 G/DL (ref 6.4–8.2)
QRS AXIS: 54 DEGREES
QRSD INTERVAL: 90 MS
QT INTERVAL: 424 MS
QTC INTERVAL: 437 MS
RBC # BLD AUTO: 3.96 MILLION/UL (ref 3.81–5.12)
SALICYLATES SERPL-MCNC: 3 MG/DL (ref 3–20)
SARS-COV-2 RNA RESP QL NAA+PROBE: NEGATIVE
SODIUM SERPL-SCNC: 139 MMOL/L (ref 136–145)
T WAVE AXIS: 48 DEGREES
THC UR QL: NEGATIVE
VENTRICULAR RATE: 64 BPM
WBC # BLD AUTO: 7.47 THOUSAND/UL (ref 4.31–10.16)

## 2020-11-02 PROCEDURE — 96365 THER/PROPH/DIAG IV INF INIT: CPT

## 2020-11-02 PROCEDURE — 70450 CT HEAD/BRAIN W/O DYE: CPT

## 2020-11-02 PROCEDURE — 80320 DRUG SCREEN QUANTALCOHOLS: CPT | Performed by: EMERGENCY MEDICINE

## 2020-11-02 PROCEDURE — 87635 SARS-COV-2 COVID-19 AMP PRB: CPT | Performed by: EMERGENCY MEDICINE

## 2020-11-02 PROCEDURE — G1004 CDSM NDSC: HCPCS

## 2020-11-02 PROCEDURE — 93005 ELECTROCARDIOGRAM TRACING: CPT

## 2020-11-02 PROCEDURE — 81025 URINE PREGNANCY TEST: CPT | Performed by: EMERGENCY MEDICINE

## 2020-11-02 PROCEDURE — 99285 EMERGENCY DEPT VISIT HI MDM: CPT | Performed by: EMERGENCY MEDICINE

## 2020-11-02 PROCEDURE — 80053 COMPREHEN METABOLIC PANEL: CPT | Performed by: EMERGENCY MEDICINE

## 2020-11-02 PROCEDURE — 85025 COMPLETE CBC W/AUTO DIFF WBC: CPT | Performed by: EMERGENCY MEDICINE

## 2020-11-02 PROCEDURE — 80307 DRUG TEST PRSMV CHEM ANLYZR: CPT | Performed by: EMERGENCY MEDICINE

## 2020-11-02 PROCEDURE — NC001 PR NO CHARGE: Performed by: EMERGENCY MEDICINE

## 2020-11-02 PROCEDURE — 72125 CT NECK SPINE W/O DYE: CPT

## 2020-11-02 PROCEDURE — 96366 THER/PROPH/DIAG IV INF ADDON: CPT

## 2020-11-02 PROCEDURE — 36415 COLL VENOUS BLD VENIPUNCTURE: CPT | Performed by: EMERGENCY MEDICINE

## 2020-11-02 PROCEDURE — 93010 ELECTROCARDIOGRAM REPORT: CPT | Performed by: INTERNAL MEDICINE

## 2020-11-02 PROCEDURE — 99285 EMERGENCY DEPT VISIT HI MDM: CPT

## 2020-11-02 PROCEDURE — 71250 CT THORAX DX C-: CPT

## 2020-11-02 PROCEDURE — 80329 ANALGESICS NON-OPIOID 1 OR 2: CPT | Performed by: EMERGENCY MEDICINE

## 2020-11-02 RX ORDER — HALOPERIDOL 5 MG
5 TABLET ORAL EVERY 6 HOURS PRN
Status: CANCELLED | OUTPATIENT
Start: 2020-11-02

## 2020-11-02 RX ORDER — BENZTROPINE MESYLATE 1 MG/ML
1 INJECTION INTRAMUSCULAR; INTRAVENOUS EVERY 6 HOURS PRN
Status: CANCELLED | OUTPATIENT
Start: 2020-11-02

## 2020-11-02 RX ORDER — NICOTINE 21 MG/24HR
21 PATCH, TRANSDERMAL 24 HOURS TRANSDERMAL ONCE
Status: DISCONTINUED | OUTPATIENT
Start: 2020-11-02 | End: 2020-11-02 | Stop reason: HOSPADM

## 2020-11-02 RX ORDER — RISPERIDONE 1 MG/1
1 TABLET, ORALLY DISINTEGRATING ORAL
Status: DISCONTINUED | OUTPATIENT
Start: 2020-11-02 | End: 2020-11-06 | Stop reason: HOSPADM

## 2020-11-02 RX ORDER — ACETAMINOPHEN 325 MG/1
975 TABLET ORAL ONCE
Status: COMPLETED | OUTPATIENT
Start: 2020-11-02 | End: 2020-11-02

## 2020-11-02 RX ORDER — MAGNESIUM SULFATE HEPTAHYDRATE 40 MG/ML
2 INJECTION, SOLUTION INTRAVENOUS ONCE
Status: COMPLETED | OUTPATIENT
Start: 2020-11-02 | End: 2020-11-02

## 2020-11-02 RX ORDER — HALOPERIDOL 5 MG/ML
5 INJECTION INTRAMUSCULAR EVERY 6 HOURS PRN
Status: CANCELLED | OUTPATIENT
Start: 2020-11-02

## 2020-11-02 RX ORDER — HYDROXYZINE HYDROCHLORIDE 25 MG/1
25 TABLET, FILM COATED ORAL EVERY 6 HOURS PRN
Status: DISCONTINUED | OUTPATIENT
Start: 2020-11-02 | End: 2020-11-06 | Stop reason: HOSPADM

## 2020-11-02 RX ORDER — TRAZODONE HYDROCHLORIDE 50 MG/1
50 TABLET ORAL
Status: DISCONTINUED | OUTPATIENT
Start: 2020-11-02 | End: 2020-11-04

## 2020-11-02 RX ORDER — OLANZAPINE 10 MG/1
10 TABLET ORAL EVERY 8 HOURS PRN
Status: CANCELLED | OUTPATIENT
Start: 2020-11-02

## 2020-11-02 RX ORDER — OLANZAPINE 10 MG/1
10 TABLET ORAL EVERY 8 HOURS PRN
Status: DISCONTINUED | OUTPATIENT
Start: 2020-11-02 | End: 2020-11-06 | Stop reason: HOSPADM

## 2020-11-02 RX ORDER — IBUPROFEN 600 MG/1
600 TABLET ORAL EVERY 6 HOURS PRN
Status: CANCELLED | OUTPATIENT
Start: 2020-11-02

## 2020-11-02 RX ORDER — POTASSIUM CHLORIDE 20 MEQ/1
40 TABLET, EXTENDED RELEASE ORAL ONCE
Status: COMPLETED | OUTPATIENT
Start: 2020-11-02 | End: 2020-11-02

## 2020-11-02 RX ORDER — MECLIZINE HCL 12.5 MG/1
25 TABLET ORAL ONCE
Status: COMPLETED | OUTPATIENT
Start: 2020-11-02 | End: 2020-11-02

## 2020-11-02 RX ORDER — HALOPERIDOL 5 MG
5 TABLET ORAL EVERY 6 HOURS PRN
Status: DISCONTINUED | OUTPATIENT
Start: 2020-11-02 | End: 2020-11-06 | Stop reason: HOSPADM

## 2020-11-02 RX ORDER — BENZTROPINE MESYLATE 1 MG/1
1 TABLET ORAL EVERY 6 HOURS PRN
Status: DISCONTINUED | OUTPATIENT
Start: 2020-11-02 | End: 2020-11-06 | Stop reason: HOSPADM

## 2020-11-02 RX ORDER — HYDROXYZINE 50 MG/1
50 TABLET, FILM COATED ORAL EVERY 6 HOURS PRN
Status: DISCONTINUED | OUTPATIENT
Start: 2020-11-02 | End: 2020-11-06 | Stop reason: HOSPADM

## 2020-11-02 RX ORDER — LORAZEPAM 1 MG/1
1 TABLET ORAL EVERY 6 HOURS PRN
Status: CANCELLED | OUTPATIENT
Start: 2020-11-02

## 2020-11-02 RX ORDER — LORAZEPAM 1 MG/1
1 TABLET ORAL EVERY 6 HOURS PRN
Status: DISCONTINUED | OUTPATIENT
Start: 2020-11-02 | End: 2020-11-06 | Stop reason: HOSPADM

## 2020-11-02 RX ORDER — ACETAMINOPHEN 325 MG/1
325 TABLET ORAL EVERY 6 HOURS PRN
Status: CANCELLED | OUTPATIENT
Start: 2020-11-02

## 2020-11-02 RX ORDER — ACETAMINOPHEN 325 MG/1
650 TABLET ORAL EVERY 4 HOURS PRN
Status: CANCELLED | OUTPATIENT
Start: 2020-11-02

## 2020-11-02 RX ORDER — ACETAMINOPHEN 325 MG/1
325 TABLET ORAL EVERY 6 HOURS PRN
Status: DISCONTINUED | OUTPATIENT
Start: 2020-11-02 | End: 2020-11-06 | Stop reason: HOSPADM

## 2020-11-02 RX ORDER — LORAZEPAM 2 MG/ML
2 INJECTION INTRAMUSCULAR EVERY 6 HOURS PRN
Status: DISCONTINUED | OUTPATIENT
Start: 2020-11-02 | End: 2020-11-06 | Stop reason: HOSPADM

## 2020-11-02 RX ORDER — BENZTROPINE MESYLATE 1 MG/1
1 TABLET ORAL EVERY 6 HOURS PRN
Status: CANCELLED | OUTPATIENT
Start: 2020-11-02

## 2020-11-02 RX ORDER — RISPERIDONE 1 MG/1
1 TABLET, ORALLY DISINTEGRATING ORAL
Status: CANCELLED | OUTPATIENT
Start: 2020-11-02

## 2020-11-02 RX ORDER — ACETAMINOPHEN 325 MG/1
650 TABLET ORAL EVERY 4 HOURS PRN
Status: DISCONTINUED | OUTPATIENT
Start: 2020-11-02 | End: 2020-11-06 | Stop reason: HOSPADM

## 2020-11-02 RX ORDER — MAGNESIUM HYDROXIDE/ALUMINUM HYDROXICE/SIMETHICONE 120; 1200; 1200 MG/30ML; MG/30ML; MG/30ML
30 SUSPENSION ORAL EVERY 4 HOURS PRN
Status: CANCELLED | OUTPATIENT
Start: 2020-11-02

## 2020-11-02 RX ORDER — HYDROXYZINE HYDROCHLORIDE 25 MG/1
25 TABLET, FILM COATED ORAL EVERY 6 HOURS PRN
Status: CANCELLED | OUTPATIENT
Start: 2020-11-02

## 2020-11-02 RX ORDER — IBUPROFEN 600 MG/1
600 TABLET ORAL EVERY 6 HOURS PRN
Status: DISCONTINUED | OUTPATIENT
Start: 2020-11-02 | End: 2020-11-03

## 2020-11-02 RX ORDER — OLANZAPINE 10 MG/1
10 INJECTION, POWDER, LYOPHILIZED, FOR SOLUTION INTRAMUSCULAR EVERY 8 HOURS PRN
Status: CANCELLED | OUTPATIENT
Start: 2020-11-02

## 2020-11-02 RX ORDER — LORAZEPAM 2 MG/ML
2 INJECTION INTRAMUSCULAR EVERY 6 HOURS PRN
Status: CANCELLED | OUTPATIENT
Start: 2020-11-02

## 2020-11-02 RX ORDER — OLANZAPINE 10 MG/1
10 INJECTION, POWDER, LYOPHILIZED, FOR SOLUTION INTRAMUSCULAR EVERY 8 HOURS PRN
Status: DISCONTINUED | OUTPATIENT
Start: 2020-11-02 | End: 2020-11-06 | Stop reason: HOSPADM

## 2020-11-02 RX ORDER — HYDROXYZINE HYDROCHLORIDE 25 MG/1
50 TABLET, FILM COATED ORAL EVERY 6 HOURS PRN
Status: CANCELLED | OUTPATIENT
Start: 2020-11-02

## 2020-11-02 RX ORDER — HALOPERIDOL 5 MG/ML
5 INJECTION INTRAMUSCULAR EVERY 6 HOURS PRN
Status: DISCONTINUED | OUTPATIENT
Start: 2020-11-02 | End: 2020-11-06 | Stop reason: HOSPADM

## 2020-11-02 RX ORDER — TRAZODONE HYDROCHLORIDE 50 MG/1
50 TABLET ORAL
Status: CANCELLED | OUTPATIENT
Start: 2020-11-02

## 2020-11-02 RX ORDER — MAGNESIUM HYDROXIDE/ALUMINUM HYDROXICE/SIMETHICONE 120; 1200; 1200 MG/30ML; MG/30ML; MG/30ML
30 SUSPENSION ORAL EVERY 4 HOURS PRN
Status: DISCONTINUED | OUTPATIENT
Start: 2020-11-02 | End: 2020-11-06 | Stop reason: HOSPADM

## 2020-11-02 RX ORDER — BENZTROPINE MESYLATE 1 MG/ML
1 INJECTION INTRAMUSCULAR; INTRAVENOUS EVERY 6 HOURS PRN
Status: DISCONTINUED | OUTPATIENT
Start: 2020-11-02 | End: 2020-11-06 | Stop reason: HOSPADM

## 2020-11-02 RX ADMIN — IBUPROFEN 600 MG: 600 TABLET, FILM COATED ORAL at 19:28

## 2020-11-02 RX ADMIN — ACETAMINOPHEN 975 MG: 325 TABLET ORAL at 02:10

## 2020-11-02 RX ADMIN — MECLIZINE 25 MG: 12.5 TABLET ORAL at 02:10

## 2020-11-02 RX ADMIN — POTASSIUM CHLORIDE 40 MEQ: 1500 TABLET, EXTENDED RELEASE ORAL at 02:10

## 2020-11-02 RX ADMIN — SODIUM CHLORIDE 1000 ML: 0.9 INJECTION, SOLUTION INTRAVENOUS at 02:10

## 2020-11-02 RX ADMIN — MAGNESIUM SULFATE IN WATER 2 G: 40 INJECTION, SOLUTION INTRAVENOUS at 02:12

## 2020-11-02 RX ADMIN — Medication 21 MG: at 14:54

## 2020-11-03 PROBLEM — E87.6 HYPOKALEMIA: Status: ACTIVE | Noted: 2020-11-03

## 2020-11-03 PROBLEM — Z00.8 MEDICAL CLEARANCE FOR PSYCHIATRIC ADMISSION: Status: ACTIVE | Noted: 2020-11-03

## 2020-11-03 PROBLEM — F33.9 RECURRENT MAJOR DEPRESSIVE DISORDER (HCC): Status: ACTIVE | Noted: 2020-11-03

## 2020-11-03 PROBLEM — Z72.0 TOBACCO USE: Status: ACTIVE | Noted: 2020-11-03

## 2020-11-03 LAB
ANION GAP SERPL CALCULATED.3IONS-SCNC: 9 MMOL/L (ref 4–13)
BUN SERPL-MCNC: 6 MG/DL (ref 5–25)
CALCIUM SERPL-MCNC: 9.1 MG/DL (ref 8.3–10.1)
CHLORIDE SERPL-SCNC: 106 MMOL/L (ref 100–108)
CHOLEST SERPL-MCNC: 164 MG/DL (ref 50–200)
CO2 SERPL-SCNC: 26 MMOL/L (ref 21–32)
CREAT SERPL-MCNC: 0.58 MG/DL (ref 0.6–1.3)
EST. AVERAGE GLUCOSE BLD GHB EST-MCNC: 105 MG/DL
GFR SERPL CREATININE-BSD FRML MDRD: 130 ML/MIN/1.73SQ M
GLUCOSE SERPL-MCNC: 72 MG/DL (ref 65–140)
HBA1C MFR BLD: 5.3 %
HCG SERPL QL: NEGATIVE
HDLC SERPL-MCNC: 55 MG/DL
LDLC SERPL CALC-MCNC: 84 MG/DL (ref 0–100)
NONHDLC SERPL-MCNC: 109 MG/DL
POTASSIUM SERPL-SCNC: 3.7 MMOL/L (ref 3.5–5.3)
RPR SER QL: NORMAL
SODIUM SERPL-SCNC: 141 MMOL/L (ref 136–145)
TRIGL SERPL-MCNC: 126 MG/DL
TSH SERPL DL<=0.05 MIU/L-ACNC: 1.59 UIU/ML (ref 0.36–3.74)

## 2020-11-03 PROCEDURE — 80048 BASIC METABOLIC PNL TOTAL CA: CPT | Performed by: PHYSICIAN ASSISTANT

## 2020-11-03 PROCEDURE — 86592 SYPHILIS TEST NON-TREP QUAL: CPT | Performed by: PHYSICIAN ASSISTANT

## 2020-11-03 PROCEDURE — 84443 ASSAY THYROID STIM HORMONE: CPT | Performed by: PHYSICIAN ASSISTANT

## 2020-11-03 PROCEDURE — 80061 LIPID PANEL: CPT | Performed by: PHYSICIAN ASSISTANT

## 2020-11-03 PROCEDURE — 99222 1ST HOSP IP/OBS MODERATE 55: CPT | Performed by: STUDENT IN AN ORGANIZED HEALTH CARE EDUCATION/TRAINING PROGRAM

## 2020-11-03 PROCEDURE — 84703 CHORIONIC GONADOTROPIN ASSAY: CPT | Performed by: PHYSICIAN ASSISTANT

## 2020-11-03 PROCEDURE — 83036 HEMOGLOBIN GLYCOSYLATED A1C: CPT | Performed by: PHYSICIAN ASSISTANT

## 2020-11-03 PROCEDURE — 99253 IP/OBS CNSLTJ NEW/EST LOW 45: CPT | Performed by: PHYSICIAN ASSISTANT

## 2020-11-03 RX ORDER — IBUPROFEN 600 MG/1
600 TABLET ORAL EVERY 6 HOURS PRN
Status: DISCONTINUED | OUTPATIENT
Start: 2020-11-03 | End: 2020-11-06 | Stop reason: HOSPADM

## 2020-11-03 RX ORDER — NICOTINE 21 MG/24HR
1 PATCH, TRANSDERMAL 24 HOURS TRANSDERMAL DAILY
Status: DISCONTINUED | OUTPATIENT
Start: 2020-11-03 | End: 2020-11-06 | Stop reason: HOSPADM

## 2020-11-03 RX ADMIN — IBUPROFEN 600 MG: 600 TABLET, FILM COATED ORAL at 13:04

## 2020-11-03 RX ADMIN — NICOTINE 1 PATCH: 21 PATCH, EXTENDED RELEASE TRANSDERMAL at 10:51

## 2020-11-03 RX ADMIN — TRAZODONE HYDROCHLORIDE 50 MG: 50 TABLET ORAL at 22:38

## 2020-11-04 PROCEDURE — 99232 SBSQ HOSP IP/OBS MODERATE 35: CPT | Performed by: PHYSICIAN ASSISTANT

## 2020-11-04 RX ORDER — TRAZODONE HYDROCHLORIDE 50 MG/1
25 TABLET ORAL
Status: DISCONTINUED | OUTPATIENT
Start: 2020-11-04 | End: 2020-11-06 | Stop reason: HOSPADM

## 2020-11-04 RX ADMIN — IBUPROFEN 600 MG: 600 TABLET, FILM COATED ORAL at 12:42

## 2020-11-04 RX ADMIN — NICOTINE 1 PATCH: 21 PATCH, EXTENDED RELEASE TRANSDERMAL at 08:07

## 2020-11-05 PROCEDURE — 99232 SBSQ HOSP IP/OBS MODERATE 35: CPT | Performed by: PHYSICIAN ASSISTANT

## 2020-11-05 RX ADMIN — NICOTINE 1 PATCH: 21 PATCH, EXTENDED RELEASE TRANSDERMAL at 08:49

## 2020-11-05 RX ADMIN — PSYLLIUM HUSK 1 PACKET: 3.4 POWDER ORAL at 17:24

## 2020-11-06 VITALS
DIASTOLIC BLOOD PRESSURE: 75 MMHG | SYSTOLIC BLOOD PRESSURE: 115 MMHG | HEIGHT: 62 IN | RESPIRATION RATE: 16 BRPM | BODY MASS INDEX: 25.62 KG/M2 | HEART RATE: 75 BPM | WEIGHT: 139.2 LBS | TEMPERATURE: 98.6 F | OXYGEN SATURATION: 100 %

## 2020-11-06 PROCEDURE — 99239 HOSP IP/OBS DSCHRG MGMT >30: CPT | Performed by: PHYSICIAN ASSISTANT

## 2020-11-06 RX ADMIN — NICOTINE 1 PATCH: 21 PATCH, EXTENDED RELEASE TRANSDERMAL at 08:16

## 2020-11-09 ENCOUNTER — TRANSITIONAL CARE MANAGEMENT (OUTPATIENT)
Dept: FAMILY MEDICINE CLINIC | Facility: CLINIC | Age: 25
End: 2020-11-09

## 2020-11-12 ENCOUNTER — OFFICE VISIT (OUTPATIENT)
Dept: FAMILY MEDICINE CLINIC | Facility: CLINIC | Age: 25
End: 2020-11-12
Payer: COMMERCIAL

## 2020-11-12 VITALS
SYSTOLIC BLOOD PRESSURE: 122 MMHG | BODY MASS INDEX: 25.41 KG/M2 | DIASTOLIC BLOOD PRESSURE: 86 MMHG | TEMPERATURE: 97.4 F | OXYGEN SATURATION: 92 % | WEIGHT: 143.4 LBS | HEART RATE: 76 BPM | HEIGHT: 63 IN

## 2020-11-12 DIAGNOSIS — Z71.6 ENCOUNTER FOR TOBACCO USE CESSATION COUNSELING: Primary | ICD-10-CM

## 2020-11-12 DIAGNOSIS — F41.8 DEPRESSION WITH ANXIETY: ICD-10-CM

## 2020-11-12 PROCEDURE — T1015 CLINIC SERVICE: HCPCS | Performed by: FAMILY MEDICINE

## 2020-11-12 PROCEDURE — 99213 OFFICE O/P EST LOW 20 MIN: CPT | Performed by: FAMILY MEDICINE

## 2020-11-12 PROCEDURE — 3008F BODY MASS INDEX DOCD: CPT | Performed by: SURGERY

## 2020-11-12 RX ORDER — NICOTINE 21 MG/24HR
1 PATCH, TRANSDERMAL 24 HOURS TRANSDERMAL EVERY 24 HOURS
Qty: 28 PATCH | Refills: 0 | Status: SHIPPED | OUTPATIENT
Start: 2020-11-12 | End: 2021-04-15 | Stop reason: ALTCHOICE

## 2021-02-15 ENCOUNTER — TELEPHONE (OUTPATIENT)
Dept: ADMINISTRATIVE | Facility: OTHER | Age: 26
End: 2021-02-15

## 2021-02-15 NOTE — TELEPHONE ENCOUNTER
----- Message from Javed Das PA-C sent at 2/15/2021 11:25 AM EST -----  Regarding: Care Gap Request  02/15/21 11:25 AM    Louann, our patient Odilia Santiago has had Pap Smear (HPV) aka Cervical Cancer Screening completed/performed  Please assist in updating the patient chart by pulling the Care Everywhere (CE) document  The date of service is 1/20/2021       Thank you,  Javed Das PA-C  [address]

## 2021-02-15 NOTE — TELEPHONE ENCOUNTER
Upon review of the In Basket request we were able to locate, review, and update the patient chart as requested for Pap Smear (HPV) aka Cervical Cancer Screening  Any additional questions or concerns should be emailed to the Practice Liaisons via [email or web address] email, please do not reply via In Basket      Thank you  Naheed Arango

## 2021-04-15 ENCOUNTER — HOSPITAL ENCOUNTER (EMERGENCY)
Facility: HOSPITAL | Age: 26
Discharge: HOME | End: 2021-04-15
Attending: EMERGENCY MEDICINE | Admitting: EMERGENCY MEDICINE
Payer: COMMERCIAL

## 2021-04-15 VITALS
SYSTOLIC BLOOD PRESSURE: 135 MMHG | RESPIRATION RATE: 18 BRPM | HEART RATE: 84 BPM | TEMPERATURE: 97.1 F | DIASTOLIC BLOOD PRESSURE: 87 MMHG | OXYGEN SATURATION: 100 %

## 2021-04-15 DIAGNOSIS — K59.00 CONSTIPATION: ICD-10-CM

## 2021-04-15 DIAGNOSIS — R10.2 PELVIC PAIN: Primary | ICD-10-CM

## 2021-04-15 LAB
ALBUMIN SERPL BCP-MCNC: 4.9 G/DL (ref 3.5–5)
ALP SERPL-CCNC: 47 U/L (ref 46–116)
ALT SERPL W P-5'-P-CCNC: 28 U/L (ref 12–78)
ANION GAP SERPL CALCULATED.3IONS-SCNC: 9 MMOL/L (ref 4–13)
AST SERPL W P-5'-P-CCNC: 14 U/L (ref 5–45)
B-HCG SERPL-ACNC: <2 MIU/ML
BASOPHILS # BLD AUTO: 0.02 THOUSANDS/ΜL (ref 0–0.1)
BASOPHILS NFR BLD AUTO: 0 % (ref 0–1)
BILIRUB SERPL-MCNC: 0.41 MG/DL (ref 0.2–1)
BILIRUB UR QL STRIP: NEGATIVE
BUN SERPL-MCNC: 7 MG/DL (ref 5–25)
CALCIUM SERPL-MCNC: 9.7 MG/DL (ref 8.3–10.1)
CHLORIDE SERPL-SCNC: 103 MMOL/L (ref 100–108)
CLARITY UR: CLEAR
CO2 SERPL-SCNC: 28 MMOL/L (ref 21–32)
COLOR UR: YELLOW
CREAT SERPL-MCNC: 0.6 MG/DL (ref 0.6–1.3)
EOSINOPHIL # BLD AUTO: 0.1 THOUSAND/ΜL (ref 0–0.61)
EOSINOPHIL NFR BLD AUTO: 2 % (ref 0–6)
ERYTHROCYTE [DISTWIDTH] IN BLOOD BY AUTOMATED COUNT: 14.8 % (ref 11.6–15.1)
GFR SERPL CREATININE-BSD FRML MDRD: 129 ML/MIN/1.73SQ M
GLUCOSE SERPL-MCNC: 76 MG/DL (ref 65–140)
GLUCOSE UR STRIP-MCNC: NEGATIVE MG/DL
HCT VFR BLD AUTO: 35.3 % (ref 34.8–46.1)
HGB BLD-MCNC: 11.2 G/DL (ref 11.5–15.4)
HGB UR QL STRIP.AUTO: NEGATIVE
IMM GRANULOCYTES # BLD AUTO: 0.05 THOUSAND/UL (ref 0–0.2)
IMM GRANULOCYTES NFR BLD AUTO: 1 % (ref 0–2)
KETONES UR STRIP-MCNC: NEGATIVE MG/DL
LEUKOCYTE ESTERASE UR QL STRIP: NEGATIVE
LIPASE SERPL-CCNC: 54 U/L (ref 73–393)
LYMPHOCYTES # BLD AUTO: 2.42 THOUSANDS/ΜL (ref 0.6–4.47)
LYMPHOCYTES NFR BLD AUTO: 43 % (ref 14–44)
MCH RBC QN AUTO: 26.2 PG (ref 26.8–34.3)
MCHC RBC AUTO-ENTMCNC: 31.7 G/DL (ref 31.4–37.4)
MCV RBC AUTO: 83 FL (ref 82–98)
MONOCYTES # BLD AUTO: 0.31 THOUSAND/ΜL (ref 0.17–1.22)
MONOCYTES NFR BLD AUTO: 6 % (ref 4–12)
NEUTROPHILS # BLD AUTO: 2.69 THOUSANDS/ΜL (ref 1.85–7.62)
NEUTS SEG NFR BLD AUTO: 48 % (ref 43–75)
NITRITE UR QL STRIP: NEGATIVE
NRBC BLD AUTO-RTO: 0 /100 WBCS
PH UR STRIP.AUTO: 6.5 [PH]
PLATELET # BLD AUTO: 292 THOUSANDS/UL (ref 149–390)
PMV BLD AUTO: 10 FL (ref 8.9–12.7)
POTASSIUM SERPL-SCNC: 3.2 MMOL/L (ref 3.5–5.3)
PROT SERPL-MCNC: 8.5 G/DL (ref 6.4–8.2)
PROT UR STRIP-MCNC: NEGATIVE MG/DL
RBC # BLD AUTO: 4.28 MILLION/UL (ref 3.81–5.12)
SODIUM SERPL-SCNC: 140 MMOL/L (ref 136–145)
SP GR UR STRIP.AUTO: <=1.005 (ref 1–1.03)
UROBILINOGEN UR QL STRIP.AUTO: 0.2 E.U./DL
WBC # BLD AUTO: 5.59 THOUSAND/UL (ref 4.31–10.16)

## 2021-04-15 PROCEDURE — 81003 URINALYSIS AUTO W/O SCOPE: CPT | Performed by: EMERGENCY MEDICINE

## 2021-04-15 PROCEDURE — 87591 N.GONORRHOEAE DNA AMP PROB: CPT | Performed by: EMERGENCY MEDICINE

## 2021-04-15 PROCEDURE — 84702 CHORIONIC GONADOTROPIN TEST: CPT | Performed by: EMERGENCY MEDICINE

## 2021-04-15 PROCEDURE — 80053 COMPREHEN METABOLIC PANEL: CPT | Performed by: EMERGENCY MEDICINE

## 2021-04-15 PROCEDURE — 99284 EMERGENCY DEPT VISIT MOD MDM: CPT | Performed by: EMERGENCY MEDICINE

## 2021-04-15 PROCEDURE — 87491 CHLMYD TRACH DNA AMP PROBE: CPT | Performed by: EMERGENCY MEDICINE

## 2021-04-15 PROCEDURE — 36415 COLL VENOUS BLD VENIPUNCTURE: CPT | Performed by: EMERGENCY MEDICINE

## 2021-04-15 PROCEDURE — 83690 ASSAY OF LIPASE: CPT | Performed by: EMERGENCY MEDICINE

## 2021-04-15 PROCEDURE — 99284 EMERGENCY DEPT VISIT MOD MDM: CPT

## 2021-04-15 PROCEDURE — 85025 COMPLETE CBC W/AUTO DIFF WBC: CPT | Performed by: EMERGENCY MEDICINE

## 2021-04-15 RX ORDER — LACTULOSE 20 G/30ML
20 SOLUTION ORAL 2 TIMES DAILY
Qty: 473 ML | Refills: 0 | Status: SHIPPED | OUTPATIENT
Start: 2021-04-15 | End: 2022-05-03

## 2021-04-16 NOTE — ED PROVIDER NOTES
History  Chief Complaint   Patient presents with    Abdominal Cramping     C/o lower abdominal cramping for the past week  States her period is 52 days late but has taken multiple pregnancy tests which were negative  No N/V/D  Patient is a 70-year-old female  She has had abdominal pain on and off for a long time  She has had problems with amenorrhea  Currently she has not had a period for over 50 days  She has taken pregnancy tests have been negative  This is had her concerned  She is worried about an ectopic pregnancy  She has alternating lower abdominal pain  No vaginal bleeding or significant discharge  Patient has a history of chronic constipation  No diarrhea  No hematemesis, hematochezia or melena  No fever or chills  No dysuria frequency or other urinary complaints  No back or flank pain  Patient reports having an ultrasound done a couple months back that was negative  Symptoms are moderate in intensity  No aggravating or relieving factors  Prior to Admission Medications   Prescriptions Last Dose Informant Patient Reported? Taking? Doxylamine Succinate, Sleep, (SLEEP AID PO) 4/14/2021 at Unknown time  Yes Yes   Sig: Take by mouth daily at bedtime as needed (sleep)       Facility-Administered Medications: None       Past Medical History:   Diagnosis Date    Depression     Insomnia     UTI (urinary tract infection)     Vertigo        Past Surgical History:   Procedure Laterality Date    WISDOM TOOTH EXTRACTION         Family History   Problem Relation Age of Onset    Heart disease Father     Cancer Paternal Grandmother         breast     I have reviewed and agree with the history as documented      E-Cigarette/Vaping    E-Cigarette Use Never User      E-Cigarette/Vaping Substances    Nicotine No     THC No     CBD No     Flavoring No     Other No     Unknown No      Social History     Tobacco Use    Smoking status: Current Every Day Smoker     Packs/day: 1 00 Types: Cigarettes    Smokeless tobacco: Never Used    Tobacco comment: trying to cut back   Substance Use Topics    Alcohol use: Yes     Frequency: 2-3 times a week     Drinks per session: 1 or 2     Binge frequency: Never     Comment: occasional    Drug use: No       Review of Systems   Constitutional: Negative for chills and fever  HENT: Negative for rhinorrhea and sore throat  Eyes: Negative for pain, redness and visual disturbance  Respiratory: Negative for cough and shortness of breath  Cardiovascular: Negative for chest pain and leg swelling  Gastrointestinal: Positive for abdominal pain and constipation  Negative for diarrhea and vomiting  Endocrine: Negative for polydipsia and polyuria  Genitourinary: Negative for dysuria, frequency, hematuria, vaginal bleeding and vaginal discharge  Musculoskeletal: Negative for back pain and neck pain  Skin: Negative for rash and wound  Allergic/Immunologic: Negative for immunocompromised state  Neurological: Negative for weakness, numbness and headaches  Hematological: Does not bruise/bleed easily  Psychiatric/Behavioral: Negative for hallucinations and suicidal ideas  All other systems reviewed and are negative  Physical Exam  Physical Exam  Vitals signs reviewed  Constitutional:       General: She is not in acute distress  HENT:      Head: Normocephalic and atraumatic  Nose: Nose normal    Eyes:      General:         Right eye: No discharge  Left eye: No discharge  Conjunctiva/sclera: Conjunctivae normal    Neck:      Musculoskeletal: Normal range of motion and neck supple  No neck rigidity  Cardiovascular:      Rate and Rhythm: Normal rate and regular rhythm  Pulses: Normal pulses  Heart sounds: Normal heart sounds  No murmur  No friction rub  No gallop  Pulmonary:      Effort: Pulmonary effort is normal  No respiratory distress  Breath sounds: Normal breath sounds  No stridor   No wheezing, rhonchi or rales  Abdominal:      General: Bowel sounds are normal  There is no distension  Palpations: Abdomen is soft  There is no mass  Tenderness: There is no abdominal tenderness  There is no right CVA tenderness, left CVA tenderness, guarding or rebound  Musculoskeletal: Normal range of motion  General: No swelling, tenderness, deformity or signs of injury  Right lower leg: No edema  Left lower leg: No edema  Comments: No calf tenderness or unilateral leg swelling  Skin:     General: Skin is warm and dry  Coloration: Skin is not jaundiced or pale  Findings: No rash  Neurological:      General: No focal deficit present  Mental Status: She is alert and oriented to person, place, and time  Sensory: No sensory deficit  Motor: Motor function is intact  No weakness     Psychiatric:         Mood and Affect: Mood normal          Behavior: Behavior normal          Vital Signs  ED Triage Vitals [04/15/21 2023]   Temperature Pulse Respirations Blood Pressure SpO2   (!) 97 1 °F (36 2 °C) 84 18 135/87 100 %      Temp Source Heart Rate Source Patient Position - Orthostatic VS BP Location FiO2 (%)   Temporal Monitor Lying Left arm --      Pain Score       7           Vitals:    04/15/21 2023   BP: 135/87   Pulse: 84   Patient Position - Orthostatic VS: Lying         Visual Acuity      ED Medications  Medications - No data to display    Diagnostic Studies  Results Reviewed     Procedure Component Value Units Date/Time    Lipase [[id number]]  (Abnormal) Collected: 04/15/21 2101    Lab Status: Final result Specimen: Blood from Arm, Right Updated: 04/15/21 2140     Lipase 54 u/L     Quantitative hCG [[id number]]  (Normal) Collected: 04/15/21 2101    Lab Status: Final result Specimen: Blood from Arm, Right Updated: 04/15/21 2140     HCG, Quant <2 mIU/mL     Narrative:       Expected Ranges:     Approximate               Approximate HCG  Gestation age Concentration ( mIU/mL)  _____________          ______________________   Poonam Oscar                      HCG values  0 2-1                       5-50  1-2                           2-3                         100-5000  3-4                         500-12013  4-5                         1000-45382  5-6                         [id number]  6-8                         [id number]  8-12                        29101-594135      Comprehensive metabolic panel [[id number]]  (Abnormal) Collected: 04/15/21 2101    Lab Status: Final result Specimen: Blood from Arm, Right Updated: 04/15/21 2124     Sodium 140 mmol/L      Potassium 3 2 mmol/L      Chloride 103 mmol/L      CO2 28 mmol/L      ANION GAP 9 mmol/L      BUN 7 mg/dL      Creatinine 0 60 mg/dL      Glucose 76 mg/dL      Calcium 9 7 mg/dL      AST 14 U/L      ALT 28 U/L      Alkaline Phosphatase 47 U/L      Total Protein 8 5 g/dL      Albumin 4 9 g/dL      Total Bilirubin 0 41 mg/dL      eGFR 129 ml/min/1 73sq m     Narrative:      [place] guidelines for Chronic Kidney Disease (CKD):     Stage 1 with normal or high GFR (GFR > 90 mL/min/1 73 square meters)    Stage 2 Mild CKD (GFR = 60-89 mL/min/1 73 square meters)    Stage 3A Moderate CKD (GFR = 45-59 mL/min/1 73 square meters)    Stage 3B Moderate CKD (GFR = 30-44 mL/min/1 73 square meters)    Stage 4 Severe CKD (GFR = 15-29 mL/min/1 73 square meters)    Stage 5 End Stage CKD (GFR <15 mL/min/1 73 square meters)  Note: GFR calculation is accurate only with a steady state creatinine    UA w Reflex to Microscopic w Reflex to Culture [[id number]] Collected: 04/15/21 2103    Lab Status: Final result Specimen: Urine, Clean Catch Updated: 04/15/21 2111     Color, UA Yellow     Clarity, UA Clear     Specific Gravity, UA <=1 005     pH, UA 6 5     Leukocytes, UA Negative     Nitrite, UA Negative     Protein, UA Negative mg/dl      Glucose, UA Negative mg/dl      Ketones, UA Negative mg/dl Urobilinogen, UA 0 2 E U /dl      Bilirubin, UA Negative     Blood, UA Negative    CBC and differential [[id number]]  (Abnormal) Collected: 04/15/21 2101    Lab Status: Final result Specimen: Blood from Arm, Right Updated: 04/15/21 2110     WBC 5 59 Thousand/uL      RBC 4 28 Million/uL      Hemoglobin 11 2 g/dL      Hematocrit 35 3 %      MCV 83 fL      MCH 26 2 pg      MCHC 31 7 g/dL      RDW 14 8 %      MPV 10 0 fL      Platelets 275 Thousands/uL      nRBC 0 /100 WBCs      Neutrophils Relative 48 %      Immat GRANS % 1 %      Lymphocytes Relative 43 %      Monocytes Relative 6 %      Eosinophils Relative 2 %      Basophils Relative 0 %      Neutrophils Absolute 2 69 Thousands/µL      Immature Grans Absolute 0 05 Thousand/uL      Lymphocytes Absolute 2 42 Thousands/µL      Monocytes Absolute 0 31 Thousand/µL      Eosinophils Absolute 0 10 Thousand/µL      Basophils Absolute 0 02 Thousands/µL     Chlamydia/GC amplified DNA by PCR [[id number]] Collected: 04/15/21 2105    Lab Status: In process Specimen: Urine, Other Updated: 04/15/21 2107                 No orders to display              Procedures  Procedures         ED Course                             SBIRT 22yo+      Most Recent Value   SBIRT (25 yo +)   In order to provide better care to our patients, we are screening all of our patients for alcohol and drug use  Would it be okay to ask you these screening questions? No Filed at: 04/15/2021 2108                    MDM  Number of Diagnoses or Management Options  Diagnosis management comments: Pregnancy test is negative  Doubt pregnancy, ectopic pregnancy, or threatened AB  No right lower quadrant tenderness to suggest appendicitis  Although I cannot track down the definitive ultrasound reading, as per patient it was normal   This would make fibroids, ovarian torsion, an ovarian cyst unlikely  No left lower quadrant pain to suggest diverticulitis  Patient has a benign abdomen at this time    Appropriate for discharge and continued outpatient management  Amount and/or Complexity of Data Reviewed  Clinical lab tests: ordered and reviewed        Disposition  Final diagnoses:   Pelvic pain   Constipation     Time reflects when diagnosis was documented in both MDM as applicable and the Disposition within this note     Time User Action Codes Description Comment    4/15/2021 10:16 PM Polo Sanes Add [R10 2] Pelvic pain     4/15/2021 10:16 PM Polo Sanes Add [K59 00] Constipation       ED Disposition     ED Disposition Condition Date/Time Comment    Discharge Stable Thu Apr 15, 2021 10:16 PM [place]  discharge to home/self care  Follow-up Information     Follow up With Specialties Details Why Brenda Menezes PA-C Family Medicine In 1 week  [address] [place], [place] [address] [phone]      Follow-up with your gynecologist in 1 week for further evaluation and treatment              Patient's Medications   Discharge Prescriptions    LACTULOSE 20 G/30 ML    Take 30 mL (20 g total) by mouth 2 (two) times a day Prn constipation       Start Date: 4/15/2021 End Date: --       Order Dose: 20 g       Quantity: 473 mL    Refills: 0     No discharge procedures on file      PDMP Review     None          ED Provider  Electronically Signed by           Qasim Hayes MD  04/15/21 1165

## 2021-04-17 LAB
C TRACH DNA SPEC QL NAA+PROBE: NEGATIVE
N GONORRHOEA DNA SPEC QL NAA+PROBE: NEGATIVE

## 2021-05-07 ENCOUNTER — HOSPITAL ENCOUNTER (INPATIENT)
Facility: HOSPITAL | Age: 26
LOS: 1 days | DRG: 817 | End: 2021-05-11
Attending: EMERGENCY MEDICINE | Admitting: INTERNAL MEDICINE
Payer: COMMERCIAL

## 2021-05-07 DIAGNOSIS — Z72.0 TOBACCO USE: ICD-10-CM

## 2021-05-07 DIAGNOSIS — T44.3X2A ANTICHOLINERGIC DRUG OVERDOSE, INTENTIONAL SELF-HARM, INITIAL ENCOUNTER (HCC): ICD-10-CM

## 2021-05-07 DIAGNOSIS — K59.00 CONSTIPATION, UNSPECIFIED CONSTIPATION TYPE: ICD-10-CM

## 2021-05-07 DIAGNOSIS — R00.0 SINUS TACHYCARDIA: ICD-10-CM

## 2021-05-07 DIAGNOSIS — G92.8 TOXIC METABOLIC ENCEPHALOPATHY: ICD-10-CM

## 2021-05-07 DIAGNOSIS — T44.3X4A ANTICHOLINERGIC DRUG OVERDOSE, UNDETERMINED INTENT, INITIAL ENCOUNTER: Primary | ICD-10-CM

## 2021-05-07 DIAGNOSIS — E87.6 HYPOKALEMIA: ICD-10-CM

## 2021-05-07 LAB
ALBUMIN SERPL BCP-MCNC: 4.9 G/DL (ref 3.5–5)
ALP SERPL-CCNC: 53 U/L (ref 46–116)
ALT SERPL W P-5'-P-CCNC: 32 U/L (ref 12–78)
AMPHETAMINES SERPL QL SCN: NEGATIVE
ANION GAP SERPL CALCULATED.3IONS-SCNC: 11 MMOL/L (ref 4–13)
APAP SERPL-MCNC: <2 UG/ML (ref 10–20)
AST SERPL W P-5'-P-CCNC: 19 U/L (ref 5–45)
BARBITURATES UR QL: NEGATIVE
BASOPHILS # BLD AUTO: 0.03 THOUSANDS/ΜL (ref 0–0.1)
BASOPHILS NFR BLD AUTO: 0 % (ref 0–1)
BENZODIAZ UR QL: NEGATIVE
BILIRUB SERPL-MCNC: 0.28 MG/DL (ref 0.2–1)
BUN SERPL-MCNC: 6 MG/DL (ref 5–25)
CALCIUM SERPL-MCNC: 9.8 MG/DL (ref 8.3–10.1)
CHLORIDE SERPL-SCNC: 101 MMOL/L (ref 100–108)
CO2 SERPL-SCNC: 26 MMOL/L (ref 21–32)
COCAINE UR QL: NEGATIVE
CREAT SERPL-MCNC: 0.65 MG/DL (ref 0.6–1.3)
EOSINOPHIL # BLD AUTO: 0.15 THOUSAND/ΜL (ref 0–0.61)
EOSINOPHIL NFR BLD AUTO: 2 % (ref 0–6)
ERYTHROCYTE [DISTWIDTH] IN BLOOD BY AUTOMATED COUNT: 14.6 % (ref 11.6–15.1)
ETHANOL SERPL-MCNC: <3 MG/DL (ref 0–3)
EXT PREG TEST URINE: NEGATIVE
EXT. CONTROL ED NAV: NORMAL
GFR SERPL CREATININE-BSD FRML MDRD: 126 ML/MIN/1.73SQ M
GLUCOSE SERPL-MCNC: 94 MG/DL (ref 65–140)
HCT VFR BLD AUTO: 35.5 % (ref 34.8–46.1)
HGB BLD-MCNC: 11.4 G/DL (ref 11.5–15.4)
IMM GRANULOCYTES # BLD AUTO: 0.04 THOUSAND/UL (ref 0–0.2)
IMM GRANULOCYTES NFR BLD AUTO: 1 % (ref 0–2)
LYMPHOCYTES # BLD AUTO: 4.26 THOUSANDS/ΜL (ref 0.6–4.47)
LYMPHOCYTES NFR BLD AUTO: 49 % (ref 14–44)
MCH RBC QN AUTO: 26.3 PG (ref 26.8–34.3)
MCHC RBC AUTO-ENTMCNC: 32.1 G/DL (ref 31.4–37.4)
MCV RBC AUTO: 82 FL (ref 82–98)
METHADONE UR QL: NEGATIVE
MONOCYTES # BLD AUTO: 0.52 THOUSAND/ΜL (ref 0.17–1.22)
MONOCYTES NFR BLD AUTO: 6 % (ref 4–12)
NEUTROPHILS # BLD AUTO: 3.61 THOUSANDS/ΜL (ref 1.85–7.62)
NEUTS SEG NFR BLD AUTO: 42 % (ref 43–75)
NRBC BLD AUTO-RTO: 0 /100 WBCS
OPIATES UR QL SCN: NEGATIVE
OXYCODONE+OXYMORPHONE UR QL SCN: NEGATIVE
PCP UR QL: NEGATIVE
PLATELET # BLD AUTO: 368 THOUSANDS/UL (ref 149–390)
PMV BLD AUTO: 10.1 FL (ref 8.9–12.7)
POTASSIUM SERPL-SCNC: 2.7 MMOL/L (ref 3.5–5.3)
PROT SERPL-MCNC: 8.9 G/DL (ref 6.4–8.2)
RBC # BLD AUTO: 4.33 MILLION/UL (ref 3.81–5.12)
SALICYLATES SERPL-MCNC: 4.9 MG/DL (ref 3–20)
SODIUM SERPL-SCNC: 138 MMOL/L (ref 136–145)
THC UR QL: NEGATIVE
TROPONIN I SERPL-MCNC: <0.02 NG/ML
TSH SERPL DL<=0.05 MIU/L-ACNC: 16.33 UIU/ML (ref 0.36–3.74)
WBC # BLD AUTO: 8.61 THOUSAND/UL (ref 4.31–10.16)

## 2021-05-07 PROCEDURE — 99285 EMERGENCY DEPT VISIT HI MDM: CPT

## 2021-05-07 PROCEDURE — 36415 COLL VENOUS BLD VENIPUNCTURE: CPT | Performed by: EMERGENCY MEDICINE

## 2021-05-07 PROCEDURE — 99291 CRITICAL CARE FIRST HOUR: CPT | Performed by: EMERGENCY MEDICINE

## 2021-05-07 PROCEDURE — 93005 ELECTROCARDIOGRAM TRACING: CPT

## 2021-05-07 PROCEDURE — 80179 DRUG ASSAY SALICYLATE: CPT | Performed by: EMERGENCY MEDICINE

## 2021-05-07 PROCEDURE — 76857 US EXAM PELVIC LIMITED: CPT | Performed by: EMERGENCY MEDICINE

## 2021-05-07 PROCEDURE — 81025 URINE PREGNANCY TEST: CPT | Performed by: EMERGENCY MEDICINE

## 2021-05-07 PROCEDURE — 84439 ASSAY OF FREE THYROXINE: CPT | Performed by: EMERGENCY MEDICINE

## 2021-05-07 PROCEDURE — 84443 ASSAY THYROID STIM HORMONE: CPT | Performed by: EMERGENCY MEDICINE

## 2021-05-07 PROCEDURE — 96361 HYDRATE IV INFUSION ADD-ON: CPT

## 2021-05-07 PROCEDURE — 84484 ASSAY OF TROPONIN QUANT: CPT | Performed by: EMERGENCY MEDICINE

## 2021-05-07 PROCEDURE — 80307 DRUG TEST PRSMV CHEM ANLYZR: CPT | Performed by: EMERGENCY MEDICINE

## 2021-05-07 PROCEDURE — 82077 ASSAY SPEC XCP UR&BREATH IA: CPT | Performed by: EMERGENCY MEDICINE

## 2021-05-07 PROCEDURE — 80143 DRUG ASSAY ACETAMINOPHEN: CPT | Performed by: EMERGENCY MEDICINE

## 2021-05-07 PROCEDURE — 80053 COMPREHEN METABOLIC PANEL: CPT | Performed by: EMERGENCY MEDICINE

## 2021-05-07 PROCEDURE — 96365 THER/PROPH/DIAG IV INF INIT: CPT

## 2021-05-07 PROCEDURE — 85025 COMPLETE CBC W/AUTO DIFF WBC: CPT | Performed by: EMERGENCY MEDICINE

## 2021-05-07 RX ORDER — MAGNESIUM SULFATE HEPTAHYDRATE 40 MG/ML
2 INJECTION, SOLUTION INTRAVENOUS ONCE
Status: COMPLETED | OUTPATIENT
Start: 2021-05-07 | End: 2021-05-08

## 2021-05-07 RX ORDER — POTASSIUM CHLORIDE 14.9 MG/ML
20 INJECTION INTRAVENOUS ONCE
Status: COMPLETED | OUTPATIENT
Start: 2021-05-07 | End: 2021-05-08

## 2021-05-07 RX ADMIN — MAGNESIUM SULFATE HEPTAHYDRATE 2 G: 40 INJECTION, SOLUTION INTRAVENOUS at 23:17

## 2021-05-07 RX ADMIN — SODIUM CHLORIDE 1000 ML: 0.9 INJECTION, SOLUTION INTRAVENOUS at 22:29

## 2021-05-08 ENCOUNTER — APPOINTMENT (EMERGENCY)
Dept: CT IMAGING | Facility: HOSPITAL | Age: 26
DRG: 817 | End: 2021-05-08
Payer: COMMERCIAL

## 2021-05-08 PROBLEM — R79.89 ELEVATED TSH: Status: ACTIVE | Noted: 2021-05-08

## 2021-05-08 PROBLEM — T50.901A OVERDOSE: Status: ACTIVE | Noted: 2021-05-08

## 2021-05-08 PROBLEM — T44.3X1A ANTICHOLINERGIC DRUG OVERDOSE: Status: ACTIVE | Noted: 2021-05-08

## 2021-05-08 PROBLEM — R94.31 PROLONGED QT INTERVAL: Status: ACTIVE | Noted: 2021-05-08

## 2021-05-08 PROBLEM — G93.40 ACUTE ENCEPHALOPATHY: Status: ACTIVE | Noted: 2021-05-08

## 2021-05-08 LAB
ALBUMIN SERPL BCP-MCNC: 4.7 G/DL (ref 3.5–5)
ALP SERPL-CCNC: 48 U/L (ref 46–116)
ALT SERPL W P-5'-P-CCNC: 31 U/L (ref 12–78)
ANION GAP SERPL CALCULATED.3IONS-SCNC: 10 MMOL/L (ref 4–13)
AST SERPL W P-5'-P-CCNC: 19 U/L (ref 5–45)
BILIRUB SERPL-MCNC: 0.34 MG/DL (ref 0.2–1)
BUN SERPL-MCNC: 6 MG/DL (ref 5–25)
CALCIUM SERPL-MCNC: 9.3 MG/DL (ref 8.3–10.1)
CHLORIDE SERPL-SCNC: 105 MMOL/L (ref 100–108)
CO2 SERPL-SCNC: 25 MMOL/L (ref 21–32)
CREAT SERPL-MCNC: 0.65 MG/DL (ref 0.6–1.3)
ERYTHROCYTE [DISTWIDTH] IN BLOOD BY AUTOMATED COUNT: 14.6 % (ref 11.6–15.1)
GFR SERPL CREATININE-BSD FRML MDRD: 126 ML/MIN/1.73SQ M
GLUCOSE P FAST SERPL-MCNC: 99 MG/DL (ref 65–99)
GLUCOSE SERPL-MCNC: 99 MG/DL (ref 65–140)
HCT VFR BLD AUTO: 33.7 % (ref 34.8–46.1)
HGB BLD-MCNC: 10.7 G/DL (ref 11.5–15.4)
MAGNESIUM SERPL-MCNC: 2.1 MG/DL (ref 1.6–2.6)
MCH RBC QN AUTO: 26.2 PG (ref 26.8–34.3)
MCHC RBC AUTO-ENTMCNC: 31.8 G/DL (ref 31.4–37.4)
MCV RBC AUTO: 82 FL (ref 82–98)
PHOSPHATE SERPL-MCNC: 4.4 MG/DL (ref 2.7–4.5)
PLATELET # BLD AUTO: 306 THOUSANDS/UL (ref 149–390)
PMV BLD AUTO: 10.3 FL (ref 8.9–12.7)
POTASSIUM SERPL-SCNC: 3.9 MMOL/L (ref 3.5–5.3)
PROT SERPL-MCNC: 8.2 G/DL (ref 6.4–8.2)
RBC # BLD AUTO: 4.09 MILLION/UL (ref 3.81–5.12)
SARS-COV-2 RNA RESP QL NAA+PROBE: NEGATIVE
SODIUM SERPL-SCNC: 140 MMOL/L (ref 136–145)
T4 FREE SERPL-MCNC: 0.97 NG/DL (ref 0.76–1.46)
T4 FREE SERPL-MCNC: 1.11 NG/DL (ref 0.76–1.46)
WBC # BLD AUTO: 8.05 THOUSAND/UL (ref 4.31–10.16)

## 2021-05-08 PROCEDURE — 80053 COMPREHEN METABOLIC PANEL: CPT | Performed by: PHYSICIAN ASSISTANT

## 2021-05-08 PROCEDURE — 70450 CT HEAD/BRAIN W/O DYE: CPT

## 2021-05-08 PROCEDURE — 93005 ELECTROCARDIOGRAM TRACING: CPT

## 2021-05-08 PROCEDURE — 83735 ASSAY OF MAGNESIUM: CPT | Performed by: PHYSICIAN ASSISTANT

## 2021-05-08 PROCEDURE — 84439 ASSAY OF FREE THYROXINE: CPT | Performed by: PHYSICIAN ASSISTANT

## 2021-05-08 PROCEDURE — U0005 INFEC AGEN DETEC AMPLI PROBE: HCPCS | Performed by: INTERNAL MEDICINE

## 2021-05-08 PROCEDURE — 99219 PR INITIAL OBSERVATION CARE/DAY 50 MINUTES: CPT | Performed by: INTERNAL MEDICINE

## 2021-05-08 PROCEDURE — 85027 COMPLETE CBC AUTOMATED: CPT | Performed by: PHYSICIAN ASSISTANT

## 2021-05-08 PROCEDURE — 99449 NTRPROF PH1/NTRNET/EHR 31/>: CPT | Performed by: EMERGENCY MEDICINE

## 2021-05-08 PROCEDURE — 96367 TX/PROPH/DG ADDL SEQ IV INF: CPT

## 2021-05-08 PROCEDURE — 84100 ASSAY OF PHOSPHORUS: CPT | Performed by: PHYSICIAN ASSISTANT

## 2021-05-08 PROCEDURE — U0003 INFECTIOUS AGENT DETECTION BY NUCLEIC ACID (DNA OR RNA); SEVERE ACUTE RESPIRATORY SYNDROME CORONAVIRUS 2 (SARS-COV-2) (CORONAVIRUS DISEASE [COVID-19]), AMPLIFIED PROBE TECHNIQUE, MAKING USE OF HIGH THROUGHPUT TECHNOLOGIES AS DESCRIBED BY CMS-2020-01-R: HCPCS | Performed by: INTERNAL MEDICINE

## 2021-05-08 RX ORDER — LORAZEPAM 2 MG/ML
1 INJECTION INTRAMUSCULAR ONCE
Status: COMPLETED | OUTPATIENT
Start: 2021-05-08 | End: 2021-05-08

## 2021-05-08 RX ORDER — MAGNESIUM SULFATE HEPTAHYDRATE 40 MG/ML
2 INJECTION, SOLUTION INTRAVENOUS ONCE
Status: COMPLETED | OUTPATIENT
Start: 2021-05-08 | End: 2021-05-08

## 2021-05-08 RX ORDER — ONDANSETRON 2 MG/ML
4 INJECTION INTRAMUSCULAR; INTRAVENOUS EVERY 6 HOURS PRN
Status: DISCONTINUED | OUTPATIENT
Start: 2021-05-08 | End: 2021-05-09

## 2021-05-08 RX ORDER — LABETALOL 20 MG/4 ML (5 MG/ML) INTRAVENOUS SYRINGE
10 EVERY 4 HOURS PRN
Status: DISCONTINUED | OUTPATIENT
Start: 2021-05-08 | End: 2021-05-11 | Stop reason: HOSPADM

## 2021-05-08 RX ORDER — LORAZEPAM 2 MG/ML
0.5 INJECTION INTRAMUSCULAR EVERY 4 HOURS PRN
Status: DISCONTINUED | OUTPATIENT
Start: 2021-05-08 | End: 2021-05-11 | Stop reason: HOSPADM

## 2021-05-08 RX ORDER — SODIUM CHLORIDE 9 MG/ML
125 INJECTION, SOLUTION INTRAVENOUS CONTINUOUS
Status: DISCONTINUED | OUTPATIENT
Start: 2021-05-08 | End: 2021-05-09

## 2021-05-08 RX ORDER — LORAZEPAM 2 MG/ML
2 INJECTION INTRAMUSCULAR EVERY 6 HOURS PRN
Status: DISCONTINUED | OUTPATIENT
Start: 2021-05-08 | End: 2021-05-08

## 2021-05-08 RX ORDER — LORAZEPAM 2 MG/ML
0.5 INJECTION INTRAMUSCULAR ONCE
Status: COMPLETED | OUTPATIENT
Start: 2021-05-08 | End: 2021-05-08

## 2021-05-08 RX ADMIN — LORAZEPAM 0.5 MG: 2 INJECTION INTRAMUSCULAR; INTRAVENOUS at 20:26

## 2021-05-08 RX ADMIN — POTASSIUM CHLORIDE 20 MEQ: 14.9 INJECTION, SOLUTION INTRAVENOUS at 00:19

## 2021-05-08 RX ADMIN — LORAZEPAM 1 MG: 2 INJECTION INTRAMUSCULAR; INTRAVENOUS at 04:03

## 2021-05-08 RX ADMIN — LORAZEPAM 0.5 MG: 2 INJECTION INTRAMUSCULAR; INTRAVENOUS at 21:47

## 2021-05-08 RX ADMIN — SODIUM CHLORIDE 125 ML/HR: 0.9 INJECTION, SOLUTION INTRAVENOUS at 10:20

## 2021-05-08 RX ADMIN — ENOXAPARIN SODIUM 40 MG: 40 INJECTION SUBCUTANEOUS at 15:58

## 2021-05-08 RX ADMIN — SODIUM CHLORIDE 125 ML/HR: 0.9 INJECTION, SOLUTION INTRAVENOUS at 17:51

## 2021-05-08 RX ADMIN — ONDANSETRON 4 MG: 2 INJECTION INTRAMUSCULAR; INTRAVENOUS at 17:56

## 2021-05-08 RX ADMIN — SODIUM CHLORIDE 125 ML/HR: 0.9 INJECTION, SOLUTION INTRAVENOUS at 02:28

## 2021-05-08 RX ADMIN — LORAZEPAM 1 MG: 2 INJECTION INTRAMUSCULAR; INTRAVENOUS at 03:44

## 2021-05-08 RX ADMIN — MAGNESIUM SULFATE HEPTAHYDRATE 2 G: 40 INJECTION, SOLUTION INTRAVENOUS at 13:20

## 2021-05-08 RX ADMIN — LORAZEPAM 1 MG: 2 INJECTION INTRAMUSCULAR; INTRAVENOUS at 23:23

## 2021-05-08 RX ADMIN — NICOTINE 7 MG/24 HR DAILY TRANSDERMAL PATCH 1 PATCH: at 08:34

## 2021-05-08 NOTE — NURSING NOTE
Call from [facility] from [facility] ([phone]) received regarding patient's therapist requesting for patient to get mental health treatment  [facility] to follow up and keep medical team posted

## 2021-05-08 NOTE — PLAN OF CARE
Problem: Prexisting or High Potential for Compromised Skin Integrity  Goal: Skin integrity is maintained or improved  Description: INTERVENTIONS:  - Identify patients at risk for skin breakdown  - Assess and monitor skin integrity  - Assess and monitor nutrition and hydration status  - Monitor labs   - Assess for incontinence   - Turn and reposition patient  - Assist with mobility/ambulation  - Relieve pressure over bony prominences  - Avoid friction and shearing  - Provide appropriate hygiene as needed including keeping skin clean and dry  - Evaluate need for skin moisturizer/barrier cream  - Collaborate with interdisciplinary team   - Patient/family teaching  - Consider wound care consult   Outcome: Progressing     Problem: Potential for Falls  Goal: Patient will remain free of falls  Description: INTERVENTIONS:  - Assess patient frequently for physical needs  -  Identify cognitive and physical deficits and behaviors that affect risk of falls    -  [place] fall precautions as indicated by assessment   - Educate patient/family on patient safety including physical limitations  - Instruct patient to call for assistance with activity based on assessment  - Modify environment to reduce risk of injury  - Consider OT/PT consult to assist with strengthening/mobility  Outcome: Progressing     Problem: PAIN - ADULT  Goal: Verbalizes/displays adequate comfort level or baseline comfort level  Description: Interventions:  - Encourage patient to monitor pain and request assistance  - Assess pain using appropriate pain scale  - Administer analgesics based on type and severity of pain and evaluate response  - Implement non-pharmacological measures as appropriate and evaluate response  - Consider cultural and social influences on pain and pain management  - Notify physician/advanced practitioner if interventions unsuccessful or patient reports new pain  Outcome: Progressing     Problem: INFECTION - ADULT  Goal: Absence or prevention of progression during hospitalization  Description: INTERVENTIONS:  - Assess and monitor for signs and symptoms of infection  - Monitor lab/diagnostic results  - Monitor all insertion sites, i e  indwelling lines, tubes, and drains  - Monitor endotracheal if appropriate and nasal secretions for changes in amount and color  - [place] appropriate cooling/warming therapies per order  - Administer medications as ordered  - Instruct and encourage patient and family to use good hand hygiene technique  - Identify and instruct in appropriate isolation precautions for identified infection/condition  Outcome: Progressing  Goal: Absence of fever/infection during neutropenic period  Description: INTERVENTIONS:  - Monitor WBC    Outcome: Progressing     Problem: SAFETY ADULT  Goal: Patient will remain free of falls  Description: INTERVENTIONS:  - Assess patient frequently for physical needs  -  Identify cognitive and physical deficits and behaviors that affect risk of falls    -  [place] fall precautions as indicated by assessment   - Educate patient/family on patient safety including physical limitations  - Instruct patient to call for assistance with activity based on assessment  - Modify environment to reduce risk of injury  - Consider OT/PT consult to assist with strengthening/mobility  Outcome: Progressing  Goal: Maintain or return to baseline ADL function  Description: INTERVENTIONS:  -  Assess patient's ability to carry out ADLs; assess patient's baseline for ADL function and identify physical deficits which impact ability to perform ADLs (bathing, care of mouth/teeth, toileting, grooming, dressing, etc )  - Assess/evaluate cause of self-care deficits   - Assess range of motion  - Assess patient's mobility; develop plan if impaired  - Assess patient's need for assistive devices and provide as appropriate  - Encourage maximum independence but intervene and supervise when necessary  - Involve family in performance of ADLs  - Assess for home care needs following discharge   - Consider OT consult to assist with ADL evaluation and planning for discharge  - Provide patient education as appropriate  Outcome: Progressing  Goal: Maintain or return mobility status to optimal level  Description: INTERVENTIONS:  - Assess patient's baseline mobility status (ambulation, transfers, stairs, etc )    - Identify cognitive and physical deficits and behaviors that affect mobility  - Identify mobility aids required to assist with transfers and/or ambulation (gait belt, sit-to-stand, lift, walker, cane, etc )  - [place] fall precautions as indicated by assessment  - Record patient progress and toleration of activity level on Mobility SBAR; progress patient to next Phase/Stage  - Instruct patient to call for assistance with activity based on assessment  - Consider rehabilitation consult to assist with strengthening/weightbearing, etc   Outcome: Progressing     Problem: DISCHARGE PLANNING  Goal: Discharge to home or other facility with appropriate resources  Description: INTERVENTIONS:  - Identify barriers to discharge w/patient and caregiver  - Arrange for needed discharge resources and transportation as appropriate  - Identify discharge learning needs (meds, wound care, etc )  - Arrange for interpretive services to assist at discharge as needed  - Refer to Case Management Department for coordinating discharge planning if the patient needs post-hospital services based on physician/advanced practitioner order or complex needs related to functional status, cognitive ability, or social support system  Outcome: Progressing     Problem: Knowledge Deficit  Goal: Patient/family/caregiver demonstrates understanding of disease process, treatment plan, medications, and discharge instructions  Description: Complete learning assessment and assess knowledge base    Interventions:  - Provide teaching at level of understanding  - Provide teaching via preferred learning methods  Outcome: Progressing     Problem: NEUROSENSORY - ADULT  Goal: Achieves stable or improved neurological status  Description: INTERVENTIONS  - Monitor and report changes in neurological status  - Monitor vital signs such as temperature, blood pressure, glucose, and any other labs ordered   - Initiate measures to prevent increased intracranial pressure  - Monitor for seizure activity and implement precautions if appropriate      Outcome: Progressing  Goal: Achieves maximal functionality and self care  Description: INTERVENTIONS  - Monitor swallowing and airway patency with patient fatigue and changes in neurological status  - Encourage and assist patient to increase activity and self care     - Encourage visually impaired, hearing impaired and aphasic patients to use assistive/communication devices  Outcome: Progressing     Problem: CARDIOVASCULAR - ADULT  Goal: Maintains optimal cardiac output and hemodynamic stability  Description: INTERVENTIONS:  - Monitor I/O, vital signs and rhythm  - Monitor for S/S and trends of decreased cardiac output  - Administer and titrate ordered vasoactive medications to optimize hemodynamic stability  - Assess quality of pulses, skin color and temperature  - Assess for signs of decreased coronary artery perfusion  - Instruct patient to report change in severity of symptoms  Outcome: Progressing  Goal: Absence of cardiac dysrhythmias or at baseline rhythm  Description: INTERVENTIONS:  - Continuous cardiac monitoring, vital signs, obtain 12 lead EKG if ordered  - Administer antiarrhythmic and heart rate control medications as ordered  - Monitor electrolytes and administer replacement therapy as ordered  Outcome: Progressing     Problem: RESPIRATORY - ADULT  Goal: Achieves optimal ventilation and oxygenation  Description: INTERVENTIONS:  - Assess for changes in respiratory status  - Assess for changes in mentation and behavior  - Position to facilitate oxygenation and minimize respiratory effort  - Oxygen administered by appropriate delivery if ordered  - Initiate smoking cessation education as indicated  - Encourage broncho-pulmonary hygiene including cough, deep breathe, Incentive Spirometry  - Assess the need for suctioning and aspirate as needed  - Assess and instruct to report SOB or any respiratory difficulty  - Respiratory Therapy support as indicated  Outcome: Progressing     Problem: METABOLIC, FLUID AND ELECTROLYTES - ADULT  Goal: Electrolytes maintained within normal limits  Description: INTERVENTIONS:  - Monitor labs and assess patient for signs and symptoms of electrolyte imbalances  - Administer electrolyte replacement as ordered  - Monitor response to electrolyte replacements, including repeat lab results as appropriate  - Instruct patient on fluid and nutrition as appropriate  Outcome: Progressing  Goal: Fluid balance maintained  Description: INTERVENTIONS:  - Monitor labs   - Monitor I/O and WT  - Instruct patient on fluid and nutrition as appropriate  - Assess for signs & symptoms of volume excess or deficit  Outcome: Progressing  Goal: Glucose maintained within target range  Description: INTERVENTIONS:  - Monitor Blood Glucose as ordered  - Assess for signs and symptoms of hyperglycemia and hypoglycemia  - Administer ordered medications to maintain glucose within target range  - Assess nutritional intake and initiate nutrition service referral as needed  Outcome: Progressing     Problem: SKIN/TISSUE INTEGRITY - ADULT  Goal: Skin integrity remains intact  Description: INTERVENTIONS  - Identify patients at risk for skin breakdown  - Assess and monitor skin integrity  - Assess and monitor nutrition and hydration status  - Monitor labs (i e  albumin)  - Assess for incontinence   - Turn and reposition patient  - Assist with mobility/ambulation  - Relieve pressure over bony prominences  - Avoid friction and shearing  - Provide appropriate hygiene as needed including keeping skin clean and dry  - Evaluate need for skin moisturizer/barrier cream  - Collaborate with interdisciplinary team (i e  Nutrition, Rehabilitation, etc )   - Patient/family teaching  Outcome: Progressing  Goal: Incision(s), wounds(s) or drain site(s) healing without S/S of infection  Description: INTERVENTIONS  - Assess and document risk factors for skin impairment   - Assess and document dressing, incision, wound bed, drain sites and surrounding tissue  - Consider nutrition services referral as needed  - Oral mucous membranes remain intact  - Provide patient/ family education  Outcome: Progressing  Goal: Oral mucous membranes remain intact  Description: INTERVENTIONS  - Assess oral mucosa and hygiene practices  - Implement preventative oral hygiene regimen  - Implement oral medicated treatments as ordered  - Initiate Nutrition services referral as needed  Outcome: Progressing     Problem: HEMATOLOGIC - ADULT  Goal: Maintains hematologic stability  Description: INTERVENTIONS  - Assess for signs and symptoms of bleeding or hemorrhage  - Monitor labs  - Administer supportive blood products/factors as ordered and appropriate  Outcome: Progressing     Problem: MUSCULOSKELETAL - ADULT  Goal: Maintain or return mobility to safest level of function  Description: INTERVENTIONS:  - Assess patient's ability to carry out ADLs; assess patient's baseline for ADL function and identify physical deficits which impact ability to perform ADLs (bathing, care of mouth/teeth, toileting, grooming, dressing, etc )  - Assess/evaluate cause of self-care deficits   - Assess range of motion  - Assess patient's mobility  - Assess patient's need for assistive devices and provide as appropriate  - Encourage maximum independence but intervene and supervise when necessary  - Involve family in performance of ADLs  - Assess for home care needs following discharge   - Consider OT consult to assist with ADL evaluation and planning for discharge  - Provide patient education as appropriate  Outcome: Progressing  Goal: Maintain proper alignment of affected body part  Description: INTERVENTIONS:  - Support, maintain and protect limb and body alignment  - Provide patient/ family with appropriate education  Outcome: Progressing

## 2021-05-08 NOTE — CASE MANAGEMENT
Unable to complete initial assessment at this time secondary to pt's mental status   Will continue to follow pt's medical status and await tele psych consult once medically cleared  Likely need for inpatient psych on dc

## 2021-05-08 NOTE — CONSULTS
INTERPROFESSIONAL (PHONE) [facility] 1978 Toxicology  Theresa Norton 23 y o  female MRN: [id number]  Unit/Bed#:  Encounter: [id number]      Reason for Consult / Principal Problem: anticholinergic toxicity    Inpatient consult to Toxicology  Consult performed by: Yazmin Duggan MD  Consult ordered by: Aaron Lovell PA-C        05/08/21      ASSESSMENT:  1   Encephalopathy  2  Tachycardia  3  Hypertension    RECOMMENDATIONS:    The patient's clinical presentation is classic for that of an anticholinergic toxidrome including her dilated pupils, dry mucous membranes, hypertension, tachycardia, altered mental status  We agree that the patient should have serial EKGs while tachycardic to ensure no QRS widening greater than 100 or QTC prolongation greater than 500  If QRS is greater than > 120 patient should receive 1-2 mEq per kg of sodium bicarbonate bolus and EKG should then be reassessed  Continue administration until QRS less than 120 has been achieved  Titrate to pH 7 55, K greater than 4 0  If refractory, may also consider using IV lidocaine or hypertonic saline  If QTC > 500, the patient should received 2 g magnesium over 30 minutes and have electrolytes optimized, then EKG reassessed afterward  Continue administration of magnesium until QTC less than 500  If the patient develops agitated delirium, can also consider giving physostigmine, however please recheck to the  on-call if this is going to be done  Do not give physostigmine to any patient that demonstrates seizures or QRS widening  Otherwise, became patient can be managed on benzodiazepines  We recommend the use of benzodiazepines for SBP > 180, heart rate > 130, agitation, seizures    Generally speaking, beta-blockers are not needed to control hypertension/tachycardia if benzodiazepines are being used, however if the patient is experiencing life-threatening tachycardia and hypertension, antihypertensives are appropriate  Assess for urinary retention  This patient can be cleared from a toxicologic standpoint when vital signs are within normal limits, mentation is at baseline, and patient is ambulatory  For further questions, please contact the medical  on call via [place] [employer] or throughl the [employer]  [employer] or [employer]  Please see additional teaching note below:    Hx and PE limited by the dynamics of a phone consultation  I have not personally interviewed or evaluated the patient, but only advised based on the information provided to me  Primary provider is responsible for all clinical decisions  Pertinent history, physical exam and clinical findings and course discussed: Nunu Mason is a 23y o  year old female who was admitted overnight for encephalopathy, suspected to be anticholinergic toxidrome  Patient was brought in by boyfriend and his mother  Patient's past medical history is significant for depression, psychiatric disorder, previous overdose  Her boyfriend, patient said that she was having a bad day at work, and later during the day, when the boyfriend called the patient, he found that she was incoherent  Patient was found at a gas station, confused  Upon arrival to the ED, the patient had mydriasis, nystagmus, do not follow commands, notably tachycardic, hypertensive  Emergency department contacted [facility] who recommended serial EKGs and close monitoring  Patient received 3 doses of Ativan  This morning, vital signs have been improving  Review of systems and physical exam not performed by me      Historical Information   Past Medical History:   Diagnosis Date    Depression     Insomnia     Psychiatric disorder     UTI (urinary tract infection)     Vertigo      Past Surgical History:   Procedure Laterality Date    WISDOM TOOTH EXTRACTION       Social History   Social History     Substance and Sexual Activity   Alcohol Use Yes  Frequency: 2-3 times a week    Drinks per session: 1 or 2    Binge frequency: Never    Comment: occasional     Social History     Substance and Sexual Activity   Drug Use No     Social History     Tobacco Use   Smoking Status Current Every Day Smoker    Packs/day: 1 00    Types: Cigarettes   Smokeless Tobacco Never Used   Tobacco Comment    trying to cut back     Family History   Problem Relation Age of Onset    Heart disease Father     Cancer Paternal Grandmother         breast        Prior to Admission medications    Medication Sig Start Date End Date Taking? Authorizing Provider   Doxylamine Succinate, Sleep, (SLEEP AID PO) Take by mouth daily at bedtime as needed (sleep)     Historical Provider, MD   lactulose 20 g/30 mL Take 30 mL (20 g total) by mouth 2 (two) times a day Prn constipation 4/15/21   Peggy Rosales MD       Current Facility-Administered Medications   Medication Dose Route Frequency    Labetalol HCl (NORMODYNE) injection 10 mg  10 mg Intravenous Q4H PRN    LORazepam (ATIVAN) injection 0 5 mg  0 5 mg Intravenous Q4H PRN    nicotine (NICODERM CQ) 7 mg/24hr TD 24 hr patch 1 patch  1 patch Transdermal Daily    ondansetron (ZOFRAN) injection 4 mg  4 mg Intravenous Q6H PRN    sodium chloride 0 9 % infusion  125 mL/hr Intravenous Continuous       No Known Allergies    Objective       Intake/Output Summary (Last 24 hours) at 5/8/2021 1244  Last data filed at 5/8/2021 1121  Gross per 24 hour   Intake 1079 17 ml   Output 3525 ml   Net -2445 83 ml       Invasive Devices:   Peripheral IV 04/15/21 Right Antecubital (Active)       Peripheral IV 05/07/21 Right Antecubital (Active)   Site Assessment [facility] 05/08/21 0235   Dressing Type Transparent 05/08/21 0235   Line Status Blood return noted; Flushed;Saline locked 05/08/21 0235   Dressing Status Old drainage 05/08/21 0235       Peripheral IV 05/08/21 Right Wrist (Active)   Site Assessment [facility] 05/08/21 0235   Dressing Type Transparent 05/08/21 0235 Line Status Blood return noted; Flushed;Saline locked 05/08/21 0235   Dressing Status Clean;Dry; Intact 05/08/21 0235       Peripheral IV 05/08/21 Right;Upper Arm (Active)   Site Assessment [facility] 05/08/21 0345   Dressing Type Transparent 05/08/21 0345   Line Status Blood return noted; Flushed;Saline locked 05/08/21 0345   Dressing Status Clean;Dry; Intact 05/08/21 0345       Urethral Catheter Non-latex 18 Fr  (Active)   Amt returned on insertion(mL) 575 mL 05/07/21 2310   Reasons to continue Urinary Catheter  Acute urinary retention/obstruction failing urinary retention protocol 05/07/21 2310   Goal for Removal No longer needed- Will place order to discontinue 05/07/21 2310   Site Assessment Clean 05/07/21 2310   Collection Container Standard drainage bag 05/07/21 2310   Securement Method Securing device (Describe) 05/07/21 2310   Output (mL) 950 mL 05/08/21 1121       Vitals   Vitals:    05/08/21 0900 05/08/21 1000 05/08/21 1100 05/08/21 1200   BP: 140/89 156/94 164/77 136/61   TempSrc:       Pulse: (!) 113 97 (!) 110 102   Resp: (!) 30 20 22 (!) 27   Patient Position - Orthostatic VS:       Temp:             EKG, Pathology, and/or Other Studies: I have personally reviewed pertinent reports  Vent  rate 110 BPM ND interval 152 ms QRS duration 88 ms QT/QTc 378/511 ms, no terminal R, no GENARO or STD    Lab Results: I have personally reviewed pertinent reports        Labs:    Results from last 7 days   Lab Units 05/08/21  0509 05/07/21  2230   WBC Thousand/uL 8 05 8 61   HEMOGLOBIN g/dL 10 7* 11 4*   HEMATOCRIT % 33 7* 35 5   PLATELETS Thousands/uL 306 368   NEUTROS PCT %  --  42*   LYMPHS PCT %  --  49*   MONOS PCT %  --  6      Results from last 7 days   Lab Units 05/08/21  0509   SODIUM mmol/L 140   POTASSIUM mmol/L 3 9   CHLORIDE mmol/L 105   CO2 mmol/L 25   BUN mg/dL 6   CREATININE mg/dL 0 65   CALCIUM mg/dL 9 3   ALK PHOS U/L 48   ALT U/L 31   AST U/L 19   MAGNESIUM mg/dL 2 1   PHOSPHORUS mg/dL 4 4              0 Lab Value Date/Time    TROPONINI <0 02 05/07/2021 2230    TROPONINI <0 02 08/18/2017 0127         Results from last 7 days   Lab Units 05/07/21  2230   ACETAMINOPHEN LVL ug/mL <2 0*   ETHANOL LVL mg/dL <3   SALICYLATE LVL mg/dL 4 9     Invalid input(s): EXTPREGUR      Imaging Studies: I have personally reviewed pertinent reports  Counseling / Coordination of Care  Total time spent today 65 minutes  This was a phone consultation

## 2021-05-08 NOTE — ASSESSMENT & PLAN NOTE
Presented to the emergency room with altered mental status  Most Likely secondary to  Possible anticholinergic (Benadryl) overdose   She is afebrile upon arrival to the ER,  No clear source of infection  No seizure  Activities, no trauma  CT head Shows-No acute intracranial abnormality     Acetaminophen levels <-1 3,  Salicylate levels 4 9, toxicology urine negative,  Blood alcohol level <3  Monitor for new onset seizure  Monitor vital signs closely  Monitor for new onset fever  Continue management of  Overdose  Monitor I/O, daily weights  Am labs  Supportive care

## 2021-05-08 NOTE — PHYSICAL THERAPY NOTE
PT eval order received  Spoke with nsg  Pt not stable for PT at this time  Pt has a 1:1  Unable to consistently follow directions  Will continue to follow and complete eval on 5/9/21 if stable and appropriate

## 2021-05-08 NOTE — QUICK NOTE
Notified by nursing staff that patient is more awake and alert  I arrived to examine patient, she's awake but she doesn't want to eat , she's requesting ice water    Will advance to finger foods diet

## 2021-05-08 NOTE — UTILIZATION REVIEW
Initial Clinical Review    Admission: Date/Time/Statement:   Admission Orders (From admission, onward)     Ordered        05/08/21 0124  Place in Observation  Once                   Orders Placed This Encounter   Procedures    Place in Observation     Standing Status:   Standing     Number of Occurrences:   1     Order Specific Question:   Level of Care     Answer:   Level 1 Stepdown [13]     ED Arrival Information     Expected Arrival Acuity Means of Arrival Escorted By Service Admission Type    - 5/7/2021 22:13 Emergent [address]  Emergency    Arrival Complaint    medical problem        Chief Complaint   Patient presents with    Medical Problem     Pt presents with mom and boyfriend who state she "took something" possibly benadryl  Patient will not respond verbally and is staring off into space as if she is seeing something  Initial Presentation:   Ms Shreya Hope is a 24 yo female who presents to the ED from home with c/o AMS probably r/t Bendryl OD  In the ED she was confused, nonverbal, tachycardic, tachypneic  PMH: Depression, psych admit post intentional Benadryl OD 11/2020  CT head was WNL  In the ED, GCS = 10, she was treated with IV Magnesium, IV KCl, IV NSS  Acetaminophen levels <-7 6,  Salicylate levels 4 9, toxicology urine negative,  Blood alcohol level <3   Per Poison control continue IV fluids, serial ECG and monitor QRS and Na Bicarb if QRS is > 100 msec , benzodiazepines for agitation  She was admitted to ICU at Level 1 Stepdown in OBSERVATION status with Anticholinergic drug OD - NPO, IV fluids, aspiration and seizure precautions, Tele, close observation, PRN Ativan for agitation, serial ECGs, Toxicology consult  Elevated TSH at 16 33 - check free T4  Acute Encephalopathy - monitor vitals, for seizure activity, fevers  Hypokalemia - 2 7 - replete and trend  Depression with anxiety - Psych consult  Tobacco use - NRT    Pt's therapist is requesting mental health treatment for pt  She remains tachycardic this morning and intermittently hypertensive  She remains stuporous, oriented to person, folliwn command, impulsive  Most recent GCS = 12        Date: 5/9   Day 2:     ED Triage Vitals   Temperature Pulse Respirations Blood Pressure SpO2   05/07/21 2218 05/07/21 2218 05/07/21 2218 05/07/21 2218 05/07/21 2218   98 2 °F (36 8 °C) (!) 146 18 124/59 100 %      Temp Source Heart Rate Source Patient Position - Orthostatic VS BP Location FiO2 (%)   05/07/21 2218 05/07/21 2218 05/07/21 2218 05/07/21 2218 --   Temporal Monitor Lying Left arm       Pain Score       05/08/21 0843       No Pain          Wt Readings from Last 1 Encounters:   05/08/21 72 8 kg (160 lb 7 9 oz)     Additional Vital Signs:   05/08/21 1000  --  97  20  156/94  120  100 %  --  --   05/08/21 0900  --  113Abnormal   30Abnormal   140/89  108  100 %  --  --   05/08/21 0800  --  97  20  176/117Abnormal   142  100 %  --  --   05/08/21 0704  97 1 °F (36 2 °C)Abnormal   99  13  154/96  121  100 %  --  Lying   05/08/21 0500  --  99  21  140/92  112  100 %  --  --   05/08/21 0400  --  108Abnormal   21  153/92  115  100 %  --  --   05/08/21 0300  --  109Abnormal   20  154/101Abnormal   122  100 %  --  --   05/08/21 0107  98 1 °F (36 7 °C)  116Abnormal   22  159/103Abnormal   126  98 %  --  --   05/08/21 0017  --  123Abnormal   18  144/91  --  98 %  None (Room air)  Lying     Pertinent Labs/Diagnostic Test Results:     5/7 CT head - no acute disease/injury    Results from last 7 days   Lab Units 05/08/21  0940   SARS-COV-2  Negative     Results from last 7 days   Lab Units 05/08/21  0509 05/07/21  2230   WBC Thousand/uL 8 05 8 61   HEMOGLOBIN g/dL 10 7* 11 4*   HEMATOCRIT % 33 7* 35 5   PLATELETS Thousands/uL 306 368   NEUTROS ABS Thousands/µL  --  3 61         Results from last 7 days   Lab Units 05/08/21  0509 05/07/21  2230   SODIUM mmol/L 140 138   POTASSIUM mmol/L 3 9 2 7*   CHLORIDE mmol/L 105 101   CO2 mmol/L 25 26   ANION GAP mmol/L 10 11   BUN mg/dL 6 6   CREATININE mg/dL 0 65 0 65   EGFR ml/min/1 73sq m 126 126   CALCIUM mg/dL 9 3 9 8   MAGNESIUM mg/dL 2 1  --    PHOSPHORUS mg/dL 4 4  --      Results from last 7 days   Lab Units 05/08/21  0509 05/07/21  2230   AST U/L 19 19   ALT U/L 31 32   ALK PHOS U/L 48 53   TOTAL PROTEIN g/dL 8 2 8 9*   ALBUMIN g/dL 4 7 4 9   TOTAL BILIRUBIN mg/dL 0 34 0 28         Results from last 7 days   Lab Units 05/08/21  0509 05/07/21  2230   GLUCOSE RANDOM mg/dL 99 94         Results from last 7 days   Lab Units 05/07/21  2230   TROPONIN I ng/mL <0 02     Results from last 7 days   Lab Units 05/07/21  2230   TSH 3RD GENERATON uIU/mL 16 330*     Results from last 7 days   Lab Units 05/07/21  2306   AMPH/METH  Negative   BARBITURATE UR  Negative   BENZODIAZEPINE UR  Negative   COCAINE UR  Negative   METHADONE URINE  Negative   OPIATE UR  Negative   PCP UR  Negative   THC UR  Negative     Results from last 7 days   Lab Units 05/07/21  2230   ETHANOL LVL mg/dL <3   ACETAMINOPHEN LVL ug/mL <0 4*   SALICYLATE LVL mg/dL 4 9     ED Treatment:   Medication Administration from 05/07/2021 2213 to 05/08/2021 6897    Date/Time Order Dose Route Action   05/07/2021 2229 sodium chloride 0 9 % bolus 1,000 mL 1,000 mL Intravenous New Bag   05/07/2021 2317 magnesium sulfate 2 g/50 mL IVPB (premix) 2 g 2 g Intravenous New Bag   05/08/2021 0019 potassium chloride 20 mEq IVPB (premix) 20 mEq Intravenous New Bag   05/08/2021 0228 sodium chloride 0 9 % infusion 125 mL/hr Intravenous New Bag        Past Medical History:   Diagnosis Date    Depression     Insomnia     Psychiatric disorder     UTI (urinary tract infection)     Vertigo      Present on Admission:   Hypokalemia   Tobacco use   Depression with anxiety    Admitting Diagnosis: Sinus tachycardia [R00 0]  Hypokalemia [E87 6]  Altered mental status [F82 77]  Toxic metabolic encephalopathy [Z03]  Anticholinergic drug overdose, undetermined intent, initial encounter [H88 3M4Y]     Age/Sex: 23 y o  female     Admission Orders:    Scheduled Medications:  nicotine, 1 patch, Transdermal, Daily      Continuous IV Infusions:  sodium chloride, 125 mL/hr, Intravenous, Continuous      PRN Meds:  Labetalol HCl, 10 mg, Intravenous, Q4H PRN  LORazepam, 2 mg, Intravenous, Q6H PRN  ondansetron, 4 mg, Intravenous, Q6H PRN    Observation  Level 1 Stepdown  Tele  Close observation  SCDs  Up w/ assist, activity as macho   Free T4 x 2 days  Diet NPO w/ sips  IP CONSULT TO CASE MANAGEMENT  IP CONSULT TO TOXICOLOGY  IP CONSULT TO PSYCHIATRY    Network Utilization Review Department  ATTENTION: Please call with any questions or concerns to [phone] and carefully listen to the prompts so that you are directed to the right person  All voicemails are confidential   Lisa Kat all requests for admission clinical reviews, approved or denied determinations and any other requests to dedicated fax number below belonging to the campus where the patient is receiving treatment   List of dedicated fax numbers for the Facilities:  [address] DENIALS (Administrative/Medical Necessity) [phone]   [address] (Maternity/NICU/Pediatrics) [phone]   [address] [place] [address] [address] [place] [address] [address] [facility] [facility] [phone]

## 2021-05-08 NOTE — H&P
[facility] [address] [place]  H&P- Duane Parents 1995, 23 y o  female MRN: [id number]  Unit/Bed#:  Encounter: [id number]  Primary Care Provider: Moriah Martinez PA-C   Date and time admitted to hospital: 5/7/2021 10:14 PM    * Anticholinergic drug overdose  Assessment & Plan  Possible Anticholinergic Overdose (benadryl)  Unknown if intentional or not  Review of her chart reveals that she had psychiatric admission for intentional Benadryl overdose in November of 2020  Family found her at [address] [place] [address] on [address] in [place] with altered mental status  She is disoriented, tachycardic, tachypneic, upon arrival to the ER  Family unsure what may have happened to her bofriends reports that she texted her boyfriend earlier in the day stating that she is not having a good day  He also reporte dthat he thinks that she "took something" and he believes it may be benadryl  She received  Magnesium sulfate 2 g potassium chloride 20 mEq, normal saline 1 L in emergency room  ER attending discussed case with  Reporting control  Recommendations given  Continue with supportive care serial EKGs,  Sodium bicarbonate 1-2 mEq per kg if QRS with  more than 100 ms Benzodiazepines for agitation    Admit to Stepdown  Cardiopulmonary monitoring  Keep NPO for now   will Advance diet  His mental status improved risk of aspiration is not a  Concerned  IV normal saline  Aspiration precautions  Seizure precaution  Monitor Airway, respiratory effort, SPO2  Ativan 2 mg IV p r n  for Agitation  Serial EKGs  Monitor I/O, daily weights  Monitor vital signs closely  Monitor for new onset seizures  Toxicology consult  Am labs  Supportive care              Elevated TSH  Assessment & Plan  TSH level at 16 33  Check Free T4  Will need repeat TSH in 4 to 6 weeks  Monitor closely    Acute encephalopathy  Assessment & Plan  Presented to the emergency room with altered mental status  Most Likely secondary to  Possible anticholinergic (Benadryl) overdose   She is afebrile upon arrival to the ER,  No clear source of infection  No seizure  Activities, no trauma  CT head Shows-No acute intracranial abnormality  Acetaminophen levels <-6 9,  Salicylate levels 4 9, toxicology urine negative,  Blood alcohol level <3  Monitor for new onset seizure  Monitor vital signs closely  Monitor for new onset fever  Continue management of  Overdose  Monitor I/O, daily weights  Am labs  Supportive care        Hypokalemia  Assessment & Plan  Potassium levels at 2 7  Replace potassium  Cardiopulmonary monitoring  Monitor potassium levels    Depression with anxiety  Assessment & Plan  Concern for  possible depress state contributing to overdose  [place] from crisis  (patient's therapist) called to request patient have  Psych evaluation pending current clinical course  Continue PTA medications  Monitor closely  Continue overdose management  Psych consult pending current clinical course    Tobacco use  Assessment & Plan  Smoking cessation counseling   Nicotine patch    VTE Prophylaxis: Low Risk after VTE screen  / reason for no mechanical VTE prophylaxis Low risk after VTE screen   Code Status: Level 1- Full code  POLST: POLST is not applicable to this patient  Discussion with family: Spoke to her boyfriend in the ER     Anticipated Length of Stay:  Patient will be admitted on an Observation basis with an anticipated length of stay of  > 2 midnights  Justification for Hospital Stay: Anticholinergic Overdose,Hypokalemia, Acute encephalopathy, Elevated TSH  Total Time for Visit, including Counseling / Coordination of Care: 30 minutes  Greater than 50% of this total time spent on direct patient counseling and coordination of care  Chief Complaint:   Overdose    History of Present Illness:    Duane Parents is a 23 y o  female who presents to the emergency room for evaluation of  altered mental status probably secondary to Benadryl overdose   Patient has a past medical history depression and psychiatric admission after an intentional Benadryl overdose in November of 2020  She was brought to the emergency room by her boyfriend and her mother  Her boyfriend reported that she texted him earlier stating that she did not have a good day  He states that he attempted to reach her subsequent to that text but  Was unsuccessful  Her mother also reported trying to return was not able to  She was later found at a gas station on [address] in [place]  Upon arrival to the ER patient was noted to be confused, nonverbal tachycardic, tachypneic  Labs completed in emergency room with results as shown below  CT head completed with results as shown below  In emergency room she received magnesium sulfate 2 g IV, potassium chloride 20 mEq p o  normal saline 1 L  In the ER attending discussed case with poison Control  Recommendations given to continue supportive care, IV fluids serial EKGs and monitor QRS  Sodium bicarbonate 1-2 mEq per kg if care is found to being more than 100 mL seconds and benzodiazepines for agitation  Upon arrival to the  ICU  Patient is still  Nonverbal, disoriented with a blank stare and dilated pupils  She  Is still tachycardic and tachypneic  Patient has been admitted on inpatient  Step-down level care for further workup and management of acute encephalopathy,  Possible anticholinergic drug overdose, hypokalemia, elevated TSH    Review of Systems:    Review of Systems   Unable to perform ROS: Mental status change       Past Medical and Surgical History:     Past Medical History:   Diagnosis Date    Depression     Insomnia     Psychiatric disorder     UTI (urinary tract infection)     Vertigo        Past Surgical History:   Procedure Laterality Date    WISDOM TOOTH EXTRACTION         Meds/Allergies:    Prior to Admission medications    Medication Sig Start Date End Date Taking?  Authorizing Provider   Doxylamine Succinate, Sleep, (SLEEP AID PO) Take by mouth daily at bedtime as needed (sleep)     Historical Provider, MD   lactulose 20 g/30 mL Take 30 mL (20 g total) by mouth 2 (two) times a day Prn constipation 4/15/21   Corine Taylor MD     I have reviewed home medications with patient family member  Allergies: No Known Allergies    Social History:     Marital Status: Single   Occupation:   Patient Pre-hospital Living Situation: Lives with parents  Patient Pre-hospital Level of Mobility: Active  Patient Pre-hospital Diet Restrictions: None reported  Substance Use History:   Social History     Substance and Sexual Activity   Alcohol Use Yes    Frequency: 2-3 times a week    Drinks per session: 1 or 2    Binge frequency: Never    Comment: occasional     Social History     Tobacco Use   Smoking Status Current Every Day Smoker    Packs/day: 1 00    Types: Cigarettes   Smokeless Tobacco Never Used   Tobacco Comment    trying to cut back     Social History     Substance and Sexual Activity   Drug Use No       Family History:    Family History   Problem Relation Age of Onset    Heart disease Father     Cancer Paternal Grandmother         breast       Physical Exam:     Vitals:   Blood Pressure: 154/96 (05/08/21 0704)  Pulse: 99 (05/08/21 0704)  Temperature: (!) 97 1 °F (36 2 °C) (05/08/21 0704)  Temp Source: Temporal (05/08/21 0704)  Respirations: 13 (05/08/21 0704)  Weight - Scale: 72 8 kg (160 lb 7 9 oz) (05/08/21 0537)  SpO2: 100 % (05/08/21 0704)    Physical Exam  Constitutional:       Appearance: She is ill-appearing and toxic-appearing  She is not diaphoretic  Comments: Disoriented   HENT:      Head: Normocephalic and atraumatic  Nose: Nose normal  No congestion or rhinorrhea  Mouth/Throat:      Mouth: Mucous membranes are moist       Pharynx: Oropharynx is clear  No oropharyngeal exudate  Eyes:      General: No scleral icterus  Right eye: No discharge  Left eye: No discharge        Conjunctiva/sclera: Conjunctivae normal  Comments: Dilated pupils   Neck:      Musculoskeletal: Neck supple  Cardiovascular:      Rate and Rhythm: Regular rhythm  Tachycardia present  Pulses: Normal pulses  Pulmonary:      Effort: No respiratory distress  Breath sounds: No wheezing, rhonchi or rales  Abdominal:      General: There is no distension  Palpations: Abdomen is soft  Tenderness: There is no abdominal tenderness  Genitourinary:     Comments: Intact Serna catheter  Musculoskeletal:      Right lower leg: No edema  Left lower leg: No edema  Skin:     Capillary Refill: Capillary refill takes less than 2 seconds  Coloration: Skin is not jaundiced or pale  Findings: No bruising, erythema or rash  Neurological:      Mental Status: She is disoriented  Additional Data:     Lab Results: I have personally reviewed pertinent reports  Results from last 7 days   Lab Units 05/08/21  0509 05/07/21  2230   WBC Thousand/uL 8 05 8 61   HEMOGLOBIN g/dL 10 7* 11 4*   HEMATOCRIT % 33 7* 35 5   PLATELETS Thousands/uL 306 368   NEUTROS PCT %  --  42*   LYMPHS PCT %  --  49*   MONOS PCT %  --  6   EOS PCT %  --  2     Results from last 7 days   Lab Units 05/08/21  0509   SODIUM mmol/L 140   POTASSIUM mmol/L 3 9   CHLORIDE mmol/L 105   CO2 mmol/L 25   BUN mg/dL 6   CREATININE mg/dL 0 65   ANION GAP mmol/L 10   CALCIUM mg/dL 9 3   ALBUMIN g/dL 4 7   TOTAL BILIRUBIN mg/dL 0 34   ALK PHOS U/L 48   ALT U/L 31   AST U/L 19   GLUCOSE RANDOM mg/dL 99                       Imaging: I have personally reviewed pertinent reports  CT head without contrast   Final Result by Heladio Leonard MD (05/08 0122)      No acute intracranial abnormality                    Workstation performed: [id number]             EKG, Pathology, and Other Studies Reviewed on Admission:   · EKG: Sinus tachycardia at rate of 147 bpm    Allscripts / Epic Records Reviewed: Yes     ** Please Note: This note has been constructed using a voice recognition system   **

## 2021-05-08 NOTE — ASSESSMENT & PLAN NOTE
Concern for  possible depress state contributing to overdose  [place]  (patient's therapist) called to request patient have  Psych evaluation pending current clinical course    Continue PTA medications  Monitor closely  Continue overdose management  Psych consult pending current clinical course

## 2021-05-08 NOTE — ED PROVIDER NOTES
History  Chief Complaint   Patient presents with    Medical Problem     Pt presents with mom and boyfriend who state she "took something" possibly benadryl  Patient will not respond verbally and is staring off into space as if she is seeing something  71-year-old female with past medical history of depression and psychiatric admission after intentional Benadryl overdose in November 2020 who is presenting for evaluation of altered mental status  History is limited as the patient is not responding to any questions  Patient was accompanied by her boyfriend and his mother  Patient's boyfriend states that he had been texting with her back and forth throughou the day today and the patient had been acting normally  She did mention having "a bad day" after she left work but did not specify further  She later texted "I love you" to her boyfriend  He responded to her but she did not return his text message  He attempted to reach her and was not successful  His mother called patient and the patient did  the phone but she was incoherent  The patient's boyfriend and his mother went looking for the patient and found her at a gas station in [place]  Patient was brought in for evaluation at that point  I also spoke with the patient's mother  She last spoke with the patient yesterday  Apparently, the patient texted her therapist "I'm sorry" earlier today  The patient's therapist then tried to reach her but was not successful in doing so  Police were called and responded the patient's home address but she was not there  The patient has her eyes open but is staring into space  She will occasionally track to verbal stimuli  She squeezed my hand once in response to a command but she does not follow commands for the most part  As mentioned above, the patient was seen in November 2020 after an intentional Benadryl overdose    At that point, the patient reported that she was depressed and attempted and her life by taking the Benadryl  She fell down stairs and required a trauma evaluation prior to being medically stable for psychiatric evaluation  The patient was ultimately admitted on a 201 basis  During her admission, the patient denied any suicidal ideation  She refused any psychiatric medications  Prior to Admission Medications   Prescriptions Last Dose Informant Patient Reported? Taking? Doxylamine Succinate, Sleep, (SLEEP AID PO)   Yes No   Sig: Take by mouth daily at bedtime as needed (sleep)    lactulose 20 g/30 mL   No No   Sig: Take 30 mL (20 g total) by mouth 2 (two) times a day Prn constipation      Facility-Administered Medications: None       Past Medical History:   Diagnosis Date    Depression     Insomnia     UTI (urinary tract infection)     Vertigo        Past Surgical History:   Procedure Laterality Date    WISDOM TOOTH EXTRACTION         Family History   Problem Relation Age of Onset    Heart disease Father     Cancer Paternal Grandmother         breast     I have reviewed and agree with the history as documented  E-Cigarette/Vaping    E-Cigarette Use Never User      E-Cigarette/Vaping Substances    Nicotine No     THC No     CBD No     Flavoring No     Other No     Unknown No      Social History     Tobacco Use    Smoking status: Current Every Day Smoker     Packs/day: 1 00     Types: Cigarettes    Smokeless tobacco: Never Used    Tobacco comment: trying to cut back   Substance Use Topics    Alcohol use: Yes     Frequency: 2-3 times a week     Drinks per session: 1 or 2     Binge frequency: Never     Comment: occasional    Drug use: No       Review of Systems   Unable to perform ROS: Mental status change       Physical Exam  Physical Exam  Vitals signs and nursing note reviewed  Constitutional:       General: She is not in acute distress  Appearance: She is well-developed  HENT:      Head: Normocephalic and atraumatic        Right Ear: External ear normal       Left Ear: External ear normal    Eyes:      Conjunctiva/sclera: Conjunctivae normal       Pupils: Pupils are equal, round, and reactive to light  Comments: Pupils are mydriatic  The patient has occasional nystagmus  Cardiovascular:      Rate and Rhythm: Regular rhythm  Tachycardia present  Heart sounds: Normal heart sounds  No murmur  Pulmonary:      Effort: Pulmonary effort is normal  No respiratory distress  Breath sounds: Normal breath sounds  No wheezing or rales  Abdominal:      General: Bowel sounds are normal  There is no distension  Palpations: Abdomen is soft  Tenderness: There is no guarding  Musculoskeletal: Normal range of motion  General: No deformity  Skin:     General: Skin is warm and dry  Capillary Refill: Capillary refill takes less than 2 seconds  Neurological:      Mental Status: She is alert  Comments: The patient has her eyes open  She will occasionally track to verbal stimuli  She does not follow commands  Patient moves all 4 extremities spontaneously  Patient does not speak at all in response to questions  She has no spontaneous verbal output  Psychiatric:      Comments: Unable to assess due to altered mental status           Vital Signs  ED Triage Vitals [05/07/21 2218]   Temperature Pulse Respirations Blood Pressure SpO2   98 2 °F (36 8 °C) (!) 146 18 124/59 100 %      Temp Source Heart Rate Source Patient Position - Orthostatic VS BP Location FiO2 (%)   Temporal Monitor Lying Left arm --      Pain Score       --           Vitals:    05/07/21 2218 05/08/21 0017   BP: 124/59 144/91   Pulse: (!) 146 (!) 123   Patient Position - Orthostatic VS: Lying Lying         Visual Acuity  Visual Acuity      Most Recent Value   L Pupil Size (mm)  4   R Pupil Size (mm)  4          ED Medications  Medications   potassium chloride 20 mEq IVPB (premix) (20 mEq Intravenous New Bag 5/8/21 0019)   sodium chloride 0 9 % bolus 1,000 mL (0 mL Intravenous Stopped 5/8/21 0017)   magnesium sulfate 2 g/50 mL IVPB (premix) 2 g (0 g Intravenous Stopped 5/8/21 0017)       Diagnostic Studies  Results Reviewed     Procedure Component Value Units Date/Time    Rapid drug screen, urine [[id number]]  (Normal) Collected: 05/07/21 2306    Lab Status: Final result Specimen: Urine, Catheter Updated: 05/07/21 2341     Amph/Meth UR Negative     Barbiturate Ur Negative     Benzodiazepine Urine Negative     Cocaine Urine Negative     Methadone Urine Negative     Opiate Urine Negative     PCP Ur Negative     THC Urine Negative     Oxycodone Urine Negative    Narrative:      FOR MEDICAL PURPOSES ONLY  IF CONFIRMATION NEEDED PLEASE CONTACT THE LAB WITHIN 5 DAYS  Drug Screen Cutoff Levels:  AMPHETAMINE/METHAMPHETAMINES  1000 ng/mL  BARBITURATES     200 ng/mL  BENZODIAZEPINES     200 ng/mL  COCAINE      300 ng/mL  METHADONE      300 ng/mL  OPIATES      300 ng/mL  PHENCYCLIDINE     25 ng/mL  THC       50 ng/mL  OXYCODONE      733 ng/mL    Salicylate level [[id number]]  (Normal) Collected: 05/07/21 2230    Lab Status: Final result Specimen: Blood from Arm, Right Updated: 03/87/95 9262     Salicylate Lvl 4 9 mg/dL     Acetaminophen level-If concentration is detectable, please discuss with medical  on call  [[id number]]  (Abnormal) Collected: 05/07/21 2230    Lab Status: Final result Specimen: Blood from Arm, Right Updated: 05/07/21 2312     Acetaminophen Level <2 0 ug/mL     TSH, 3rd generation with Free T4 reflex [[id number]]  (Abnormal) Collected: 05/07/21 2230    Lab Status: Final result Specimen: Blood from Arm, Right Updated: 05/07/21 2312     TSH 3RD GENERATON 16 330 uIU/mL     Narrative:      Patients undergoing fluorescein dye angiography may retain small amounts of fluorescein in the body for 48-72 hours post procedure  Samples containing fluorescein can produce falsely depressed TSH values   If the patient had this procedure,a specimen should be resubmitted post fluorescein clearance  T4, free [[id number]] Collected: 05/07/21 2230    Lab Status:  In process Specimen: Blood from Arm, Right Updated: 05/07/21 2312    Troponin I [[id number]]  (Normal) Collected: 05/07/21 2230    Lab Status: Final result Specimen: Blood Updated: 05/07/21 2310     Troponin I <0 02 ng/mL     POCT pregnancy, urine [[id number]]  (Normal) Resulted: 05/07/21 2309    Lab Status: Final result Updated: 05/07/21 2309     EXT PREG TEST UR (Ref: Negative) negative     Control valid    Comprehensive metabolic panel [[id number]]  (Abnormal) Collected: 05/07/21 2230    Lab Status: Final result Specimen: Blood from Arm, Right Updated: 05/07/21 2307     Sodium 138 mmol/L      Potassium 2 7 mmol/L      Chloride 101 mmol/L      CO2 26 mmol/L      ANION GAP 11 mmol/L      BUN 6 mg/dL      Creatinine 0 65 mg/dL      Glucose 94 mg/dL      Calcium 9 8 mg/dL      AST 19 U/L      ALT 32 U/L      Alkaline Phosphatase 53 U/L      Total Protein 8 9 g/dL      Albumin 4 9 g/dL      Total Bilirubin 0 28 mg/dL      eGFR 126 ml/min/1 73sq m     Narrative:      [place] guidelines for Chronic Kidney Disease (CKD):     Stage 1 with normal or high GFR (GFR > 90 mL/min/1 73 square meters)    Stage 2 Mild CKD (GFR = 60-89 mL/min/1 73 square meters)    Stage 3A Moderate CKD (GFR = 45-59 mL/min/1 73 square meters)    Stage 3B Moderate CKD (GFR = 30-44 mL/min/1 73 square meters)    Stage 4 Severe CKD (GFR = 15-29 mL/min/1 73 square meters)    Stage 5 End Stage CKD (GFR <15 mL/min/1 73 square meters)  Note: GFR calculation is accurate only with a steady state creatinine    Ethanol [[id number]]  (Normal) Collected: 05/07/21 2230    Lab Status: Final result Specimen: Blood from Arm, Right Updated: 05/07/21 2253     Ethanol Lvl <3 mg/dL     CBC and differential [[id number]]  (Abnormal) Collected: 05/07/21 2230    Lab Status: Final result Specimen: Blood from Arm, Right Updated: 05/07/21 2245     WBC 8 61 Thousand/uL      RBC 4 33 Million/uL      Hemoglobin 11 4 g/dL      Hematocrit 35 5 %      MCV 82 fL      MCH 26 3 pg      MCHC 32 1 g/dL      RDW 14 6 %      MPV 10 1 fL      Platelets 493 Thousands/uL      nRBC 0 /100 WBCs      Neutrophils Relative 42 %      Immat GRANS % 1 %      Lymphocytes Relative 49 %      Monocytes Relative 6 %      Eosinophils Relative 2 %      Basophils Relative 0 %      Neutrophils Absolute 3 61 Thousands/µL      Immature Grans Absolute 0 04 Thousand/uL      Lymphocytes Absolute 4 26 Thousands/µL      Monocytes Absolute 0 52 Thousand/µL      Eosinophils Absolute 0 15 Thousand/µL      Basophils Absolute 0 03 Thousands/µL                  CT head without contrast   Final Result by Poly Peng MD (05/08 0122)      No acute intracranial abnormality                    Workstation performed: [id number]                    Procedures  CriticalCare Time  Performed by: Maddie Watts MD  Authorized by: Maddie Watts MD     Critical care provider statement:     Critical care time (minutes):  40    Critical care time was exclusive of:  Separately billable procedures and treating other patients    Critical care was necessary to treat or prevent imminent or life-threatening deterioration of the following conditions:  Toxidrome    Critical care was time spent personally by me on the following activities:  Obtaining history from patient or surrogate, development of treatment plan with patient or surrogate, discussions with consultants, evaluation of patient's response to treatment, examination of patient, ordering and performing treatments and interventions, ordering and review of laboratory studies, ordering and review of radiographic studies, re-evaluation of patient's condition and review of old charts    I assumed direction of critical care for this patient from another provider in my specialty: no                       ED Course  ED Course as of May 08 0124   Fri May 07, 2021   0188 Pulse(!): 146   2243 EKG interpreted by me  Sinus tachycardia at 147 beats per minute  Normal axis  Normal conduction  No ectopy  QRS is normal in duration  No T-wave inversions  No ST elevation or ST depression  2243 Bedside ultrasound demonstrated markedly distended bladder  Overall clinical presentation is concerning for anticholinergic toxidrome  2247 Stable anemia  Hemoglobin(!): 11 4   2309 Will give IV magnesium and IV potassium as patient is altered and not reliably able to take PO  Potassium(!!): 2 7   2309 PREGNANCY TEST URINE: negative   2310 Troponin I: <0 02   2311 Urine output 575 mL        2312 TSH 3RD GENERATON(!): 16 330   2312 ACETAMINOPHEN LEVEL(!): <2 0   2315 Message sent to [address] [place] [address] for admission  2355 At the request of SLIM, spoke with the [facility]  They recommended continued supportive care  Also recommended obtaining an EKG in few hours and administering 1-2 mEq per kilogram of sodium bicarbonate if her QRS was widened above 100  Benzodiazepines could be given for agitation  [facility] from [address] [place] [address] is requesting we do a head CT prior to admission  MDM  Number of Diagnoses or Management Options  Anticholinergic drug overdose, undetermined intent, initial encounter: new and requires workup  Hypokalemia: new and does not require workup  Sinus tachycardia: new and requires workup  Toxic metabolic encephalopathy: new and requires workup  Diagnosis management comments:     Differential diagnosis included but was not limited to drug intoxication, drug overdose, anticholinergic toxidrome, electrolyte abnormality, hyperthyroidism, and hypothyroidism  The patient was given IV fluids due to tachycardia with improvement in her heart rate  EKG showed sinus tachycardia but QRS was normal and there were no ischemic changes  CMP demonstrated hypokalemia which was repleted    TSH was elevated but history and physical examination were not compatible with hypothyroidism as the etiology of her altered mental status  Serna catheter was inserted due to urinary retention  Overall, the patient's clinical presentation was most consistent with anticholinergic toxidrome  She had previously overdosed on Benadryl in November 2020  Case was discussed with Poison Control as mentioned above  SLIM requested head CT which was negative  The patient was admitted for further evaluation and treatment of her altered mental status         Amount and/or Complexity of Data Reviewed  Clinical lab tests: ordered and reviewed  Tests in the radiology section of CPT®: ordered and reviewed  Decide to obtain previous medical records or to obtain history from someone other than the patient: yes  Obtain history from someone other than the patient: yes  Review and summarize past medical records: yes  Discuss the patient with other providers: yes  Independent visualization of images, tracings, or specimens: yes    Risk of Complications, Morbidity, and/or Mortality  Presenting problems: high  Diagnostic procedures: minimal  Management options: minimal    Patient Progress  Patient progress: improved      Disposition  Final diagnoses:   Anticholinergic drug overdose, undetermined intent, initial encounter   Sinus tachycardia   Hypokalemia   Toxic metabolic encephalopathy     Time reflects when diagnosis was documented in both MDM as applicable and the Disposition within this note     Time User Action Codes Description Comment    5/8/2021  1:02 AM Leigh Patron Add [T44 3X4A] Anticholinergic drug overdose, undetermined intent, initial encounter     5/8/2021  1:02 AM Leigh Patron Add [R00 0] Sinus tachycardia     5/8/2021  1:03 AM Leigh Patron Add [E87 6] Hypokalemia     5/8/2021  1:03 AM Alto Patron Add [C56] Toxic metabolic encephalopathy       ED Disposition     ED Disposition Condition Date/Time Comment    Admit Fair Sat May 8, 2021  1:02 AM Case was discussed with GLEN and the patient's admission status was agreed to be Admission Status: observation status to the service of Dr Kris Stewart  Follow-up Information    None         Patient's Medications   Discharge Prescriptions    No medications on file     No discharge procedures on file      PDMP Review     None          ED Provider  Electronically Signed by           Raysa Velasquez MD  05/08/21 9264

## 2021-05-09 PROBLEM — R33.9 URINARY RETENTION: Status: ACTIVE | Noted: 2021-05-09

## 2021-05-09 PROBLEM — T14.91XA SUICIDE ATTEMPT (HCC): Status: ACTIVE | Noted: 2021-05-09

## 2021-05-09 PROBLEM — G92.9 TOXIC ENCEPHALOPATHY: Status: ACTIVE | Noted: 2021-05-08

## 2021-05-09 PROBLEM — D64.9 ANEMIA: Status: ACTIVE | Noted: 2021-05-09

## 2021-05-09 PROBLEM — E87.0 HYPERNATREMIA: Status: ACTIVE | Noted: 2021-05-09

## 2021-05-09 PROBLEM — R10.9 ABDOMINAL PAIN: Status: ACTIVE | Noted: 2021-05-09

## 2021-05-09 LAB
ALBUMIN SERPL BCP-MCNC: 3.9 G/DL (ref 3.5–5)
ALP SERPL-CCNC: 46 U/L (ref 46–116)
ALT SERPL W P-5'-P-CCNC: 22 U/L (ref 12–78)
ANION GAP SERPL CALCULATED.3IONS-SCNC: 10 MMOL/L (ref 4–13)
AST SERPL W P-5'-P-CCNC: 20 U/L (ref 5–45)
B-HCG SERPL-ACNC: <2 MIU/ML
BASOPHILS # BLD AUTO: 0.02 THOUSANDS/ΜL (ref 0–0.1)
BASOPHILS NFR BLD AUTO: 0 % (ref 0–1)
BILIRUB SERPL-MCNC: 0.4 MG/DL (ref 0.2–1)
BUN SERPL-MCNC: 5 MG/DL (ref 5–25)
CA-I BLD-SCNC: 1.25 MMOL/L (ref 1.12–1.32)
CALCIUM SERPL-MCNC: 8.6 MG/DL (ref 8.3–10.1)
CHLORIDE SERPL-SCNC: 110 MMOL/L (ref 100–108)
CK SERPL-CCNC: 52 U/L (ref 26–192)
CO2 SERPL-SCNC: 26 MMOL/L (ref 21–32)
CREAT SERPL-MCNC: 0.62 MG/DL (ref 0.6–1.3)
EOSINOPHIL # BLD AUTO: 0.17 THOUSAND/ΜL (ref 0–0.61)
EOSINOPHIL NFR BLD AUTO: 2 % (ref 0–6)
ERYTHROCYTE [DISTWIDTH] IN BLOOD BY AUTOMATED COUNT: 15.1 % (ref 11.6–15.1)
GFR SERPL CREATININE-BSD FRML MDRD: 128 ML/MIN/1.73SQ M
GLUCOSE SERPL-MCNC: 86 MG/DL (ref 65–140)
HAV IGM SER QL: NORMAL
HBV CORE IGM SER QL: NORMAL
HBV SURFACE AG SER QL: NORMAL
HCT VFR BLD AUTO: 30.2 % (ref 34.8–46.1)
HCV AB SER QL: NORMAL
HGB BLD-MCNC: 9.4 G/DL (ref 11.5–15.4)
IMM GRANULOCYTES # BLD AUTO: 0.02 THOUSAND/UL (ref 0–0.2)
IMM GRANULOCYTES NFR BLD AUTO: 0 % (ref 0–2)
LYMPHOCYTES # BLD AUTO: 2.3 THOUSANDS/ΜL (ref 0.6–4.47)
LYMPHOCYTES NFR BLD AUTO: 30 % (ref 14–44)
MAGNESIUM SERPL-MCNC: 2.2 MG/DL (ref 1.6–2.6)
MCH RBC QN AUTO: 26.3 PG (ref 26.8–34.3)
MCHC RBC AUTO-ENTMCNC: 31.1 G/DL (ref 31.4–37.4)
MCV RBC AUTO: 85 FL (ref 82–98)
MONOCYTES # BLD AUTO: 0.58 THOUSAND/ΜL (ref 0.17–1.22)
MONOCYTES NFR BLD AUTO: 8 % (ref 4–12)
NEUTROPHILS # BLD AUTO: 4.56 THOUSANDS/ΜL (ref 1.85–7.62)
NEUTS SEG NFR BLD AUTO: 60 % (ref 43–75)
NRBC BLD AUTO-RTO: 0 /100 WBCS
PHOSPHATE SERPL-MCNC: 3.8 MG/DL (ref 2.7–4.5)
PLATELET # BLD AUTO: 248 THOUSANDS/UL (ref 149–390)
PMV BLD AUTO: 9.8 FL (ref 8.9–12.7)
POTASSIUM SERPL-SCNC: 3.6 MMOL/L (ref 3.5–5.3)
PROCALCITONIN SERPL-MCNC: <0.05 NG/ML
PROT SERPL-MCNC: 6.8 G/DL (ref 6.4–8.2)
RBC # BLD AUTO: 3.57 MILLION/UL (ref 3.81–5.12)
SODIUM SERPL-SCNC: 146 MMOL/L (ref 136–145)
TROPONIN I SERPL-MCNC: 0.02 NG/ML
WBC # BLD AUTO: 7.65 THOUSAND/UL (ref 4.31–10.16)

## 2021-05-09 PROCEDURE — 80074 ACUTE HEPATITIS PANEL: CPT | Performed by: INTERNAL MEDICINE

## 2021-05-09 PROCEDURE — 82550 ASSAY OF CK (CPK): CPT | Performed by: INTERNAL MEDICINE

## 2021-05-09 PROCEDURE — 84100 ASSAY OF PHOSPHORUS: CPT | Performed by: INTERNAL MEDICINE

## 2021-05-09 PROCEDURE — 84145 PROCALCITONIN (PCT): CPT | Performed by: INTERNAL MEDICINE

## 2021-05-09 PROCEDURE — 83735 ASSAY OF MAGNESIUM: CPT | Performed by: INTERNAL MEDICINE

## 2021-05-09 PROCEDURE — 87389 HIV-1 AG W/HIV-1&-2 AB AG IA: CPT | Performed by: INTERNAL MEDICINE

## 2021-05-09 PROCEDURE — 97167 OT EVAL HIGH COMPLEX 60 MIN: CPT

## 2021-05-09 PROCEDURE — 85025 COMPLETE CBC W/AUTO DIFF WBC: CPT | Performed by: INTERNAL MEDICINE

## 2021-05-09 PROCEDURE — 84484 ASSAY OF TROPONIN QUANT: CPT | Performed by: INTERNAL MEDICINE

## 2021-05-09 PROCEDURE — 84702 CHORIONIC GONADOTROPIN TEST: CPT | Performed by: INTERNAL MEDICINE

## 2021-05-09 PROCEDURE — 99225 PR SBSQ OBSERVATION CARE/DAY 25 MINUTES: CPT | Performed by: INTERNAL MEDICINE

## 2021-05-09 PROCEDURE — 97163 PT EVAL HIGH COMPLEX 45 MIN: CPT | Performed by: PHYSICAL THERAPIST

## 2021-05-09 PROCEDURE — 82330 ASSAY OF CALCIUM: CPT | Performed by: INTERNAL MEDICINE

## 2021-05-09 PROCEDURE — 80053 COMPREHEN METABOLIC PANEL: CPT | Performed by: INTERNAL MEDICINE

## 2021-05-09 RX ORDER — ACETAMINOPHEN 325 MG/1
650 TABLET ORAL EVERY 6 HOURS PRN
Status: DISCONTINUED | OUTPATIENT
Start: 2021-05-09 | End: 2021-05-11 | Stop reason: HOSPADM

## 2021-05-09 RX ORDER — LEVOTHYROXINE SODIUM 0.05 MG/1
50 TABLET ORAL
Status: DISCONTINUED | OUTPATIENT
Start: 2021-05-10 | End: 2021-05-10

## 2021-05-09 RX ORDER — SODIUM CHLORIDE, SODIUM GLUCONATE, SODIUM ACETATE, POTASSIUM CHLORIDE, MAGNESIUM CHLORIDE, SODIUM PHOSPHATE, DIBASIC, AND POTASSIUM PHOSPHATE .53; .5; .37; .037; .03; .012; .00082 G/100ML; G/100ML; G/100ML; G/100ML; G/100ML; G/100ML; G/100ML
100 INJECTION, SOLUTION INTRAVENOUS CONTINUOUS
Status: DISCONTINUED | OUTPATIENT
Start: 2021-05-09 | End: 2021-05-11 | Stop reason: HOSPADM

## 2021-05-09 RX ORDER — POTASSIUM CHLORIDE 20 MEQ/1
40 TABLET, EXTENDED RELEASE ORAL ONCE
Status: COMPLETED | OUTPATIENT
Start: 2021-05-09 | End: 2021-05-09

## 2021-05-09 RX ORDER — MAGNESIUM HYDROXIDE/ALUMINUM HYDROXICE/SIMETHICONE 120; 1200; 1200 MG/30ML; MG/30ML; MG/30ML
30 SUSPENSION ORAL EVERY 4 HOURS PRN
Status: DISCONTINUED | OUTPATIENT
Start: 2021-05-09 | End: 2021-05-11 | Stop reason: HOSPADM

## 2021-05-09 RX ADMIN — SODIUM CHLORIDE, SODIUM GLUCONATE, SODIUM ACETATE, POTASSIUM CHLORIDE, MAGNESIUM CHLORIDE, SODIUM PHOSPHATE, DIBASIC, AND POTASSIUM PHOSPHATE 100 ML/HR: .53; .5; .37; .037; .03; .012; .00082 INJECTION, SOLUTION INTRAVENOUS at 08:29

## 2021-05-09 RX ADMIN — POTASSIUM CHLORIDE 40 MEQ: 1500 TABLET, EXTENDED RELEASE ORAL at 08:31

## 2021-05-09 RX ADMIN — ALUMINA, MAGNESIA, AND SIMETHICONE ORAL SUSPENSION REGULAR STRENGTH 30 ML: 1200; 1200; 120 SUSPENSION ORAL at 17:35

## 2021-05-09 RX ADMIN — NICOTINE 7 MG/24 HR DAILY TRANSDERMAL PATCH 1 PATCH: at 08:30

## 2021-05-09 RX ADMIN — ALUMINA, MAGNESIA, AND SIMETHICONE ORAL SUSPENSION REGULAR STRENGTH 30 ML: 1200; 1200; 120 SUSPENSION ORAL at 21:36

## 2021-05-09 RX ADMIN — ACETAMINOPHEN 650 MG: 325 TABLET, FILM COATED ORAL at 17:35

## 2021-05-09 RX ADMIN — SODIUM CHLORIDE 125 ML/HR: 0.9 INJECTION, SOLUTION INTRAVENOUS at 01:20

## 2021-05-09 RX ADMIN — ENOXAPARIN SODIUM 40 MG: 40 INJECTION SUBCUTANEOUS at 16:03

## 2021-05-09 RX ADMIN — SODIUM CHLORIDE, SODIUM GLUCONATE, SODIUM ACETATE, POTASSIUM CHLORIDE, MAGNESIUM CHLORIDE, SODIUM PHOSPHATE, DIBASIC, AND POTASSIUM PHOSPHATE 100 ML/HR: .53; .5; .37; .037; .03; .012; .00082 INJECTION, SOLUTION INTRAVENOUS at 17:40

## 2021-05-09 RX ADMIN — LORAZEPAM 0.5 MG: 2 INJECTION INTRAMUSCULAR; INTRAVENOUS at 21:28

## 2021-05-09 NOTE — PHYSICAL THERAPY NOTE
Physical Therapy Evaluation     Patient Name: Tressa Goltz    HVRWC'N Date: 5/9/2021     Problem List  Principal Problem:    Anticholinergic drug overdose  Active Problems:    Depression with anxiety    Hypokalemia    Tobacco use    Acute encephalopathy    Elevated TSH    Prolonged QT interval       Past Medical History  Past Medical History:   Diagnosis Date    Depression     Insomnia     Psychiatric disorder     UTI (urinary tract infection)     Vertigo         Past Surgical History  Past Surgical History:   Procedure Laterality Date    WISDOM TOOTH EXTRACTION             05/09/21 0858   Note Type   Note type Evaluation   Pain Assessment   Pain Assessment Tool Pain Assessment not indicated - pt denies pain   Home Living   Additional Comments See OT Evaluation for details    Prior Function   Comments See OT Evaluation for details    Restrictions/Precautions   Weight Bearing Precautions Per Order No   Other Precautions Telemetry;Multiple lines; Fall Risk   Cognition   Overall Cognitive Status Impaired   Arousal/Participation Responsive   Attention Within functional limits   Orientation Level Oriented to place;Oriented to person   Memory Decreased recall of recent events   Following Commands Follows one step commands without difficulty   RLE Assessment   RLE Assessment WFL   LLE Assessment   LLE Assessment WFL   Bed Mobility   Supine to Sit 4  Minimal assistance   Additional items Assist x 1; Increased time required;Verbal cues; Bedrails   Additional Comments Patient seated in chair at the end of the session   Transfers   Sit to Stand 5  Supervision   Additional items Increased time required;Verbal cues   Stand to Sit 5  Supervision   Additional items Increased time required;Verbal cues   Additional Comments RW   Ambulation/Elevation   Gait pattern Excessively slow; Inconsistent jessica; Redundant gait at times; Short stride;Poor UE support   Gait Assistance 5 Supervision   Assistive Device Rolling walker   Distance 20 feet in room   Balance   Static Sitting Good   Dynamic Sitting Good   Static Standing Fair +   Dynamic Standing Fair +   Ambulatory Fair   Activity Tolerance   Activity Tolerance Patient limited by fatigue   Assessment   Prognosis Fair   Problem List Decreased strength;Decreased range of motion;Decreased endurance; Impaired balance;Decreased mobility   Assessment Pt is 23 y o  female seen for PT evaluation s/p admit to [email or web address] on 5/7/2021 w/ Anticholinergic drug overdose  PT consulted to assess pt's functional mobility and d/c needs  Order placed for PT eval and tx, w/ up and OOB as tolerated order  Comorbidities affecting pt's physical performance at time of assessment include  UTI vertigo, insomnia, psychiatric disorder    PTA, pt was independent w/ all functional mobility w/ no AD   Personal factors affecting pt at time of IE include: ambulating w/ assistive device, inability to ambulate household distances, inability to navigate community distances, inability to navigate level surfaces w/o external assistance, unable to perform dynamic tasks in community, decreased cognition, decreased initiation and engagement, unable to perform physical activity, limited insight into impairments, inability to perform IADLs and inability to perform ADLs  Please find objective findings from PT assessment regarding body systems outlined above with impairments and limitations including weakness, decreased ROM, impaired balance, decreased endurance, impaired coordination, gait deviations, pain, decreased activity tolerance, decreased functional mobility tolerance, decreased safety awareness, impaired judgement and fall risk  From PT/mobility standpoint, recommendation at time of d/c would be no rehabilitation needs pending progress in order to facilitate return to PLOF     Goals   Patient Goals to feel better   LTG Expiration Date 05/23/21   Long Term Goal #1 Patient will be (I) with all transfers and bed mobility    Long Term Goal #2 Patient will safely ambulate 250 feet (I) to help faciltiate return to [place]    Plan   Treatment/Interventions ADL retraining;Functional transfer training;LE strengthening/ROM; Elevations; Therapeutic exercise; Endurance training;Bed mobility;Gait training   Recommendation   PT Discharge Recommendation No rehabilitation needs   AM-PAC Basic Mobility Inpatient   Turning in Bed Without Bedrails 3   Lying on Back to Sitting on Edge of Flat Bed 3   Moving Bed to Chair 3   Standing Up From Chair 3   Walk in Room 4   Climb 3-5 Stairs 3   Basic Mobility Inpatient Raw Score 19   Basic Mobility Standardized Score 42 48     Patient in bed when PT entered  Patient seated in chair when PT left  All lines intact, all needs within reach  Patients raw score on the [address] inpatient short form is 19, standardized score is 42 48, (greater than, 42 9  patient's at this level are likely to benefit from D/C to home/however, please refer to therapist recommendation for safe D/C Plan

## 2021-05-09 NOTE — ASSESSMENT & PLAN NOTE
· Outpatient follow-up with Psychiatry and Psychiatry after her inpatient psychiatric hospitalization

## 2021-05-09 NOTE — ASSESSMENT & PLAN NOTE
· Mild, generalized abdominal pain  · Does not appear to have a surgical abdomen on physical exam  · Trial of PRN PO tylenol and PRN PO maalox  · Serial abdominal examinations

## 2021-05-09 NOTE — PLAN OF CARE
Problem: Prexisting or High Potential for Compromised Skin Integrity  Goal: Skin integrity is maintained or improved  Description: INTERVENTIONS:  - Identify patients at risk for skin breakdown  - Assess and monitor skin integrity  - Assess and monitor nutrition and hydration status  - Monitor labs   - Assess for incontinence   - Turn and reposition patient  - Assist with mobility/ambulation  - Relieve pressure over bony prominences  - Avoid friction and shearing  - Provide appropriate hygiene as needed including keeping skin clean and dry  - Evaluate need for skin moisturizer/barrier cream  - Collaborate with interdisciplinary team   - Patient/family teaching  - Consider wound care consult   Outcome: Progressing     Problem: Potential for Falls  Goal: Patient will remain free of falls  Description: INTERVENTIONS:  - Assess patient frequently for physical needs  -  Identify cognitive and physical deficits and behaviors that affect risk of falls    -  [place] fall precautions as indicated by assessment   - Educate patient/family on patient safety including physical limitations  - Instruct patient to call for assistance with activity based on assessment  - Modify environment to reduce risk of injury  - Consider OT/PT consult to assist with strengthening/mobility  Outcome: Progressing     Problem: PAIN - ADULT  Goal: Verbalizes/displays adequate comfort level or baseline comfort level  Description: Interventions:  - Encourage patient to monitor pain and request assistance  - Assess pain using appropriate pain scale  - Administer analgesics based on type and severity of pain and evaluate response  - Implement non-pharmacological measures as appropriate and evaluate response  - Consider cultural and social influences on pain and pain management  - Notify physician/advanced practitioner if interventions unsuccessful or patient reports new pain  Outcome: Progressing     Problem: INFECTION - ADULT  Goal: Absence or prevention of progression during hospitalization  Description: INTERVENTIONS:  - Assess and monitor for signs and symptoms of infection  - Monitor lab/diagnostic results  - Monitor all insertion sites, i e  indwelling lines, tubes, and drains  - Monitor endotracheal if appropriate and nasal secretions for changes in amount and color  - [place] appropriate cooling/warming therapies per order  - Administer medications as ordered  - Instruct and encourage patient and family to use good hand hygiene technique  - Identify and instruct in appropriate isolation precautions for identified infection/condition  Outcome: Progressing  Goal: Absence of fever/infection during neutropenic period  Description: INTERVENTIONS:  - Monitor WBC    Outcome: Progressing     Problem: SAFETY ADULT  Goal: Patient will remain free of falls  Description: INTERVENTIONS:  - Assess patient frequently for physical needs  -  Identify cognitive and physical deficits and behaviors that affect risk of falls    -  [place] fall precautions as indicated by assessment   - Educate patient/family on patient safety including physical limitations  - Instruct patient to call for assistance with activity based on assessment  - Modify environment to reduce risk of injury  - Consider OT/PT consult to assist with strengthening/mobility  Outcome: Progressing  Goal: Maintain or return to baseline ADL function  Description: INTERVENTIONS:  -  Assess patient's ability to carry out ADLs; assess patient's baseline for ADL function and identify physical deficits which impact ability to perform ADLs (bathing, care of mouth/teeth, toileting, grooming, dressing, etc )  - Assess/evaluate cause of self-care deficits   - Assess range of motion  - Assess patient's mobility; develop plan if impaired  - Assess patient's need for assistive devices and provide as appropriate  - Encourage maximum independence but intervene and supervise when necessary  - Involve family in performance of ADLs  - Assess for home care needs following discharge   - Consider OT consult to assist with ADL evaluation and planning for discharge  - Provide patient education as appropriate  Outcome: Progressing  Goal: Maintain or return mobility status to optimal level  Description: INTERVENTIONS:  - Assess patient's baseline mobility status (ambulation, transfers, stairs, etc )    - Identify cognitive and physical deficits and behaviors that affect mobility  - Identify mobility aids required to assist with transfers and/or ambulation (gait belt, sit-to-stand, lift, walker, cane, etc )  - [place] fall precautions as indicated by assessment  - Record patient progress and toleration of activity level on Mobility SBAR; progress patient to next Phase/Stage  - Instruct patient to call for assistance with activity based on assessment  - Consider rehabilitation consult to assist with strengthening/weightbearing, etc   Outcome: Progressing     Problem: DISCHARGE PLANNING  Goal: Discharge to home or other facility with appropriate resources  Description: INTERVENTIONS:  - Identify barriers to discharge w/patient and caregiver  - Arrange for needed discharge resources and transportation as appropriate  - Identify discharge learning needs (meds, wound care, etc )  - Arrange for interpretive services to assist at discharge as needed  - Refer to Case Management Department for coordinating discharge planning if the patient needs post-hospital services based on physician/advanced practitioner order or complex needs related to functional status, cognitive ability, or social support system  Outcome: Progressing     Problem: Knowledge Deficit  Goal: Patient/family/caregiver demonstrates understanding of disease process, treatment plan, medications, and discharge instructions  Description: Complete learning assessment and assess knowledge base    Interventions:  - Provide teaching at level of understanding  - Provide teaching via preferred learning methods  Outcome: Progressing     Problem: NEUROSENSORY - ADULT  Goal: Achieves stable or improved neurological status  Description: INTERVENTIONS  - Monitor and report changes in neurological status  - Monitor vital signs such as temperature, blood pressure, glucose, and any other labs ordered   - Initiate measures to prevent increased intracranial pressure  - Monitor for seizure activity and implement precautions if appropriate      Outcome: Progressing  Goal: Achieves maximal functionality and self care  Description: INTERVENTIONS  - Monitor swallowing and airway patency with patient fatigue and changes in neurological status  - Encourage and assist patient to increase activity and self care     - Encourage visually impaired, hearing impaired and aphasic patients to use assistive/communication devices  Outcome: Progressing     Problem: CARDIOVASCULAR - ADULT  Goal: Maintains optimal cardiac output and hemodynamic stability  Description: INTERVENTIONS:  - Monitor I/O, vital signs and rhythm  - Monitor for S/S and trends of decreased cardiac output  - Administer and titrate ordered vasoactive medications to optimize hemodynamic stability  - Assess quality of pulses, skin color and temperature  - Assess for signs of decreased coronary artery perfusion  - Instruct patient to report change in severity of symptoms  Outcome: Progressing  Goal: Absence of cardiac dysrhythmias or at baseline rhythm  Description: INTERVENTIONS:  - Continuous cardiac monitoring, vital signs, obtain 12 lead EKG if ordered  - Administer antiarrhythmic and heart rate control medications as ordered  - Monitor electrolytes and administer replacement therapy as ordered  Outcome: Progressing     Problem: RESPIRATORY - ADULT  Goal: Achieves optimal ventilation and oxygenation  Description: INTERVENTIONS:  - Assess for changes in respiratory status  - Assess for changes in mentation and behavior  - Position to facilitate oxygenation and minimize respiratory effort  - Oxygen administered by appropriate delivery if ordered  - Initiate smoking cessation education as indicated  - Encourage broncho-pulmonary hygiene including cough, deep breathe, Incentive Spirometry  - Assess the need for suctioning and aspirate as needed  - Assess and instruct to report SOB or any respiratory difficulty  - Respiratory Therapy support as indicated  Outcome: Progressing     Problem: METABOLIC, FLUID AND ELECTROLYTES - ADULT  Goal: Electrolytes maintained within normal limits  Description: INTERVENTIONS:  - Monitor labs and assess patient for signs and symptoms of electrolyte imbalances  - Administer electrolyte replacement as ordered  - Monitor response to electrolyte replacements, including repeat lab results as appropriate  - Instruct patient on fluid and nutrition as appropriate  Outcome: Progressing  Goal: Fluid balance maintained  Description: INTERVENTIONS:  - Monitor labs   - Monitor I/O and WT  - Instruct patient on fluid and nutrition as appropriate  - Assess for signs & symptoms of volume excess or deficit  Outcome: Progressing  Goal: Glucose maintained within target range  Description: INTERVENTIONS:  - Monitor Blood Glucose as ordered  - Assess for signs and symptoms of hyperglycemia and hypoglycemia  - Administer ordered medications to maintain glucose within target range  - Assess nutritional intake and initiate nutrition service referral as needed  Outcome: Progressing     Problem: SKIN/TISSUE INTEGRITY - ADULT  Goal: Skin integrity remains intact  Description: INTERVENTIONS  - Identify patients at risk for skin breakdown  - Assess and monitor skin integrity  - Assess and monitor nutrition and hydration status  - Monitor labs (i e  albumin)  - Assess for incontinence   - Turn and reposition patient  - Assist with mobility/ambulation  - Relieve pressure over bony prominences  - Avoid friction and shearing  - Provide appropriate hygiene as needed including keeping skin clean and dry  - Evaluate need for skin moisturizer/barrier cream  - Collaborate with interdisciplinary team (i e  Nutrition, Rehabilitation, etc )   - Patient/family teaching  Outcome: Progressing  Goal: Incision(s), wounds(s) or drain site(s) healing without S/S of infection  Description: INTERVENTIONS  - Assess and document risk factors for skin impairment   - Assess and document dressing, incision, wound bed, drain sites and surrounding tissue  - Consider nutrition services referral as needed  - Oral mucous membranes remain intact  - Provide patient/ family education  Outcome: Progressing  Goal: Oral mucous membranes remain intact  Description: INTERVENTIONS  - Assess oral mucosa and hygiene practices  - Implement preventative oral hygiene regimen  - Implement oral medicated treatments as ordered  - Initiate Nutrition services referral as needed  Outcome: Progressing     Problem: HEMATOLOGIC - ADULT  Goal: Maintains hematologic stability  Description: INTERVENTIONS  - Assess for signs and symptoms of bleeding or hemorrhage  - Monitor labs  - Administer supportive blood products/factors as ordered and appropriate  Outcome: Progressing     Problem: MUSCULOSKELETAL - ADULT  Goal: Maintain or return mobility to safest level of function  Description: INTERVENTIONS:  - Assess patient's ability to carry out ADLs; assess patient's baseline for ADL function and identify physical deficits which impact ability to perform ADLs (bathing, care of mouth/teeth, toileting, grooming, dressing, etc )  - Assess/evaluate cause of self-care deficits   - Assess range of motion  - Assess patient's mobility  - Assess patient's need for assistive devices and provide as appropriate  - Encourage maximum independence but intervene and supervise when necessary  - Involve family in performance of ADLs  - Assess for home care needs following discharge   - Consider OT consult to assist with ADL evaluation and planning for discharge  - Provide patient education as appropriate  Outcome: Progressing  Goal: Maintain proper alignment of affected body part  Description: INTERVENTIONS:  - Support, maintain and protect limb and body alignment  - Provide patient/ family with appropriate education  Outcome: Progressing

## 2021-05-09 NOTE — OCCUPATIONAL THERAPY NOTE
Occupational Therapy Evaluation     Patient Name: Valerie Arora  JDLHC'F Date: 5/9/2021  Problem List  Principal Problem:    Anticholinergic drug overdose  Active Problems:    Depression with anxiety    Hypokalemia    Tobacco use    Acute encephalopathy    Elevated TSH    Prolonged QT interval    Past Medical History  Past Medical History:   Diagnosis Date    Depression     Insomnia     Psychiatric disorder     UTI (urinary tract infection)     Vertigo      Past Surgical History  Past Surgical History:   Procedure Laterality Date    WISDOM TOOTH EXTRACTION               05/09/21 0857   OT Last Visit   OT Visit Date 05/09/21   Note Type   Note type Evaluation   Restrictions/Precautions   Weight Bearing Precautions Per Order No   Other Precautions Multiple lines;Telemetry; Fall Risk; Chair Alarm;1:1   Pain Assessment   Pain Assessment Tool Pain Assessment not indicated - pt denies pain   Home Living   Type of [facility] Two level   Bathroom Accessibility Accessible   Home Equipment Other (Comment)  (no medical DME)   Additional Comments pt is poor historian at this time with slow processing in regards to home living; pt does not utilize device   Prior Function   Level of Stout Independent with ADLs and functional mobility   Lives With Family   ADL Assistance Independent   IADLs Independent   Falls in the last 6 months 0   Vocational   (unknown)   Comments unclear if patient drives   Psychosocial   Psychosocial (WDL) X   Patient Behaviors/Mood Flat affect; Withdrawn   Subjective   Subjective "I have a cool dog"   ADL   Where Assessed Edge of bed   LB Dressing Assistance 5  Supervision/Setup   LB Dressing Deficit Increased time to complete; Don/doff R sock; Don/doff L sock   Additional Comments pt performs LB dressing with significantly increased time   Bed Mobility   Supine to Sit 4  Minimal assistance   Additional items Assist x 1;Bedrails; Increased time required   Sit to Supine   (seated in chair at end of session)   Additional Comments pt on RA during session; Spo2 WFL with no complaints of SOB   Transfers   Sit to Stand 5  Supervision   Additional items Increased time required   Stand to Sit 5  Supervision   Additional items Increased time required   Additional Comments pt initially with no device, however appeared unsteady, RW utilized for remainder of session   Functional Mobility   Functional Mobility 5  Supervision   Additional Comments pt with significantly increased time to perform ~10ft to chair; limited by fatigue and unsteadiness   Balance   Static Sitting Good   Dynamic Sitting Good   Static Standing Fair +   Dynamic Standing Fair +   Ambulatory Fair   Activity Tolerance   Activity Tolerance Patient limited by fatigue   RUE Assessment   RUE Assessment WFL   LUE Assessment   LUE Assessment WFL   Hand Function   Gross Motor Coordination Functional   Fine Motor Coordination Functional   Sensation   Light Touch No apparent deficits   Sharp/Dull No apparent deficits   Cognition   Overall Cognitive Status Impaired   Arousal/Participation Cooperative;Lethargic   Attention Within functional limits   Orientation Level Oriented to person;Oriented to place   Memory Decreased recall of recent events   Following Commands Follows one step commands without difficulty   Comments pt appears slow to process information and response rate   Assessment   Limitation Decreased ADL status; Decreased UE strength;Decreased Safe judgement during ADL;Decreased endurance;Decreased cognition;Decreased self-care trans;Decreased high-level ADLs   Assessment Pt is a 23 y o  female seen for OT evaluation s/p admit to [facility] on 5/7/2021 w/ Anticholinergic drug overdose  Comorbidities affecting pt's functional performance at time of assessment include: UTI, vertigo, depression, insomnia   Personal factors affecting pt at time of IE include:difficulty performing ADLS, difficulty performing IADLS , decreased initiation and engagement  and health management   Prior to admission, pt was (I) with ADLs and IADLs with no device during mobility  Upon evaluation: Pt requires (S)-min (A) level with use of RW during mobility 2* the following deficits impacting occupational performance: weakness, decreased strength, decreased balance, decreased tolerance, impaired attention, impaired initiation, decreased safety awareness and impaired interpersonal skills  Pt to benefit from continued skilled OT tx while in the hospital to address deficits as defined above and maximize level of functional independence w ADL's and functional mobility  Occupational Performance areas to address include: grooming, bathing/shower, toilet hygiene, dressing, functional mobility, community mobility and clothing management  The patient's raw score on the AM-PAC Daily Activity inpatient short form is 22, standardized score is 47 1, greater than 39 4  Patients at this level are likely to benefit from discharge to home  Please refer to the recommendation of the Occupational Therapist for safe discharge planning  Goals   Patient Goals to feel better   Short Term Goal  pt will perform (I) toileting and hygiene    Long Term Goal #1 pt will demonstrate (I) level with funcitonal mobility with no device   Long Term Goal #2 pt will perform UB/LB bathing and grooming tasks at (I) level   Plan   Treatment Interventions ADL retraining;Functional transfer training;UE strengthening/ROM; Endurance training;Patient/family training; Activityengagement   Goal Expiration Date 05/23/21   OT Frequency 3-5x/wk   Recommendation   OT Discharge Recommendation No rehabilitation needs   AM-PAC Daily Activity Inpatient   Lower Body Dressing 3   Bathing 3   Toileting 4   Upper Body Dressing 4   Grooming 4   Eating 4   Daily Activity Raw Score 22   Daily Activity Standardized Score (Calc for Raw Score >=11) 47  1   AM-[facility] Applied Cognition Inpatient   Following a Speech/Presentation 3   Understanding Ordinary Conversation 3   Taking Medications 3   Remembering Where Things Are Placed or Put Away 3   Remembering List of 4-5 Errands 3   Taking Care of Complicated Tasks 3   Applied Cognition Raw Score 18   Applied Cognition Standardized Score 38 07     Pt will benefit from continued OT services in order to maximize (I) c ADL performance, FM c RW, and improve overall endurance/strength required to complete functional tasks in preparation for d/c  Pt left seated in chair at end of session; all needs within reach; all lines intact

## 2021-05-09 NOTE — ASSESSMENT & PLAN NOTE
· Received magnesium sulfate 2 grams IV x 1 dose on 05/08/2021  · Recheck an EKG on 05/08/2021  · The prolonged QT interval will need a need to be resolved prior to patient being medically cleared for transfer to an inpatient psychiatric facility per Toxicology's recommendations

## 2021-05-09 NOTE — ASSESSMENT & PLAN NOTE
· Secondary to anticholinergic drug overdose  · The Serna catheter was removed on 05/09/2021  · Check the patient post-void residual amounts every shift with bladder scanning, and follow the urinary retention protocol

## 2021-05-09 NOTE — QUICK NOTE
Notified by primary RN that patient is increasingly agitated even with administration of PRN Ativan 0 5 mg  Will give additional 0 5 mg IV Ativan now  Continue to monitor closely

## 2021-05-09 NOTE — PLAN OF CARE
Problem: PHYSICAL THERAPY ADULT  Goal: Performs mobility at highest level of function for planned discharge setting  See evaluation for individualized goals  Description: Treatment/Interventions: ADL retraining, Functional transfer training, LE strengthening/ROM, Elevations, Therapeutic exercise, Endurance training, Bed mobility, Gait training          See flowsheet documentation for full assessment, interventions and recommendations  Note: Prognosis: Fair  Problem List: Decreased strength, Decreased range of motion, Decreased endurance, Impaired balance, Decreased mobility  Assessment: Pt is 23 y o  female seen for PT evaluation s/p admit to [address] on 5/7/2021 w/ Anticholinergic drug overdose  PT consulted to assess pt's functional mobility and d/c needs  Order placed for PT eval and tx, w/ up and OOB as tolerated order  Comorbidities affecting pt's physical performance at time of assessment include  UTI vertigo, insomnia, psychiatric disorder    PTA, pt was independent w/ all functional mobility w/ no AD   Personal factors affecting pt at time of IE include: ambulating w/ assistive device, inability to ambulate household distances, inability to navigate community distances, inability to navigate level surfaces w/o external assistance, unable to perform dynamic tasks in community, decreased cognition, decreased initiation and engagement, unable to perform physical activity, limited insight into impairments, inability to perform IADLs and inability to perform ADLs  Please find objective findings from PT assessment regarding body systems outlined above with impairments and limitations including weakness, decreased ROM, impaired balance, decreased endurance, impaired coordination, gait deviations, pain, decreased activity tolerance, decreased functional mobility tolerance, decreased safety awareness, impaired judgement and fall risk  From PT/mobility standpoint, recommendation at time of d/c would be no rehabilitation needs pending progress in order to facilitate return to PLOF  PT Discharge Recommendation: No rehabilitation needs          See flowsheet documentation for full assessment

## 2021-05-09 NOTE — ASSESSMENT & PLAN NOTE
· With the significant level of TSH elevation, initiate levothyroxine 50 mcg PO Qdaily in the early morning  · Check a free T4 level  · Recheck a TSH level in 4-6 weeks with her PCP

## 2021-05-09 NOTE — NURSING NOTE
Patient agitated, tearful and hallucinating, stating " there are bugs all over me and snakes everywhere " PRN 0 5 mg Ativan administered as ordered @ 2026 with no effect  Ange Gardiner PA-C made aware     Additional 0 5 mg IV Ativan ordered and administered @ 2147

## 2021-05-09 NOTE — ASSESSMENT & PLAN NOTE
· Secondary to an intentional anticholinergic drug overdose along with benzodiazepine use  · The patient's mental status is improving

## 2021-05-09 NOTE — ASSESSMENT & PLAN NOTE
· Check an iron panel, vitamin B12 level, and folate level  · Check the patient's stool for occult blood x 3 specimens  · Follow the CBC  · Transfuse for a hemoglobin less than 7 g/dl    Results from last 7 days   Lab Units 05/09/21  0433 05/08/21  0509 05/07/21  2230   WBC Thousand/uL 7 65 8 05 8 61   HEMOGLOBIN g/dL 9 4* 10 7* 11 4*   HEMATOCRIT % 30 2* 33 7* 35 5   PLATELETS Thousands/uL 248 306 368

## 2021-05-09 NOTE — ASSESSMENT & PLAN NOTE
· Likely secondary to the NSS IV fluids with minimal PO intake  · Change IV fluids to [place] IV fluids at 100 ml/hr  · Follow the sodium level    Results from last 7 days   Lab Units 05/09/21  0433 05/08/21  0509 05/07/21  2230   SODIUM mmol/L 146* 140 138   POTASSIUM mmol/L 3 6 3 9 2 7*   CHLORIDE mmol/L 110* 105 101   CO2 mmol/L 26 25 26   BUN mg/dL 5 6 6   CREATININE mg/dL 0 62 0 65 0 65   CALCIUM mg/dL 8 6 9 3 9 8

## 2021-05-09 NOTE — PLAN OF CARE
Problem: OCCUPATIONAL THERAPY ADULT  Goal: Performs self-care activities at highest level of function for planned discharge setting  See evaluation for individualized goals  Description: Treatment Interventions: ADL retraining, Functional transfer training, UE strengthening/ROM, Endurance training, Patient/family training, Activityengagement          See flowsheet documentation for full assessment, interventions and recommendations  Note: Limitation: Decreased ADL status, Decreased UE strength, Decreased Safe judgement during ADL, Decreased endurance, Decreased cognition, Decreased self-care trans, Decreased high-level ADLs     Assessment: Pt is a 23 y o  female seen for OT evaluation s/p admit to [facility] on 5/7/2021 w/ Anticholinergic drug overdose  Comorbidities affecting pt's functional performance at time of assessment include: UTI, vertigo, depression, insomnia  Personal factors affecting pt at time of IE include:difficulty performing ADLS, difficulty performing IADLS , decreased initiation and engagement  and health management   Prior to admission, pt was (I) with ADLs and IADLs with no device during mobility  Upon evaluation: Pt requires (S)-min (A) level with use of RW during mobility 2* the following deficits impacting occupational performance: weakness, decreased strength, decreased balance, decreased tolerance, impaired attention, impaired initiation, decreased safety awareness and impaired interpersonal skills  Pt to benefit from continued skilled OT tx while in the hospital to address deficits as defined above and maximize level of functional independence w ADL's and functional mobility  Occupational Performance areas to address include: grooming, bathing/shower, toilet hygiene, dressing, functional mobility, community mobility and clothing management  The patient's raw score on the AM-PAC Daily Activity inpatient short form is 22, standardized score is 47 1, greater than 39 4   Patients at this level are likely to benefit from discharge to home  Please refer to the recommendation of the Occupational Therapist for safe discharge planning        OT Discharge Recommendation: No rehabilitation needs

## 2021-05-09 NOTE — PROGRESS NOTES
[address]  Progress Note - Elisha Romero 1995, 23 y o  female MRN: [id number]  Unit/Bed#:  Encounter: [id number]  Primary Care Provider: Mague Reyna PA-C   Date and time admitted to hospital: 5/7/2021 10:14 PM    * Anticholinergic drug overdose  Assessment & Plan  · The patient admitted to me today, 05/09/2021, that she took many benadryl tablets along with a few xanax tablets in an attempt to commit suicide  · The patient cannot leave AMA (against medical advice)  · The patient is willing to sign a 201 at this time, but a 302 will be enacted if she decides not to sign a 201  · The patient was seen in consultation by Toxicology  · The patient did not receive by physostigmine in the emergency department  · Utilize PRN IV ativan for agitation  · Continue Isolyte IV fluids at 100 ml/hr  · Received magnesium sulfate 2 grams IV x 1 dose on 05/08/2021  · The patient is only tolerating minimal PO intake at this time  · Per Toxicology for the patient to be medically cleared to be transferred to an inpatient psychiatric unit, the patient's prolonged QT interval must be normalized, her mentation must be at her baseline, her vital signs must be normalized, the patient must be ambulatory  · Recheck an EKG on 05/10/2021            Suicide attempt [facility])  Assessment & Plan  · Please see the plan for "Anticholinergic drug overdose"    Urinary retention  Assessment & Plan  · Secondary to anticholinergic drug overdose  · The Serna catheter was removed on 05/09/2021  · Check the patient post-void residual amounts every shift with bladder scanning, and follow the urinary retention protocol      Anemia  Assessment & Plan  · Check an iron panel, vitamin B12 level, and folate level  · Check the patient's stool for occult blood x 3 specimens  · Follow the CBC  · Transfuse for a hemoglobin less than 7 g/dl    Results from last 7 days   Lab Units 05/09/21  0433 05/08/21  0509 05/07/21  8889 WBC Thousand/uL 7 65 8 05 8 61   HEMOGLOBIN g/dL 9 4* 10 7* 11 4*   HEMATOCRIT % 30 2* 33 7* 35 5   PLATELETS Thousands/uL 248 306 368           Hypernatremia  Assessment & Plan  · Likely secondary to the NSS IV fluids with minimal PO intake  · Change IV fluids to Isolyte IV fluids at 100 ml/hr  · Follow the sodium level    Results from last 7 days   Lab Units 05/09/21  0433 05/08/21  0509 05/07/21  2230   SODIUM mmol/L 146* 140 138   POTASSIUM mmol/L 3 6 3 9 2 7*   CHLORIDE mmol/L 110* 105 101   CO2 mmol/L 26 25 26   BUN mg/dL 5 6 6   CREATININE mg/dL 0 62 0 65 0 65   CALCIUM mg/dL 8 6 9 3 9 8         Abdominal pain  Assessment & Plan  · Mild, generalized abdominal pain  · Does not appear to have a surgical abdomen on physical exam  · Trial of PRN PO tylenol and PRN PO maalox  · Serial abdominal examinations    Prolonged QT interval  Assessment & Plan  · Received magnesium sulfate 2 grams IV x 1 dose on 05/08/2021  · Recheck an EKG on 05/08/2021  · The prolonged QT interval will need a need to be resolved prior to patient being medically cleared for transfer to an inpatient psychiatric facility per Toxicology's recommendations  Elevated TSH  Assessment & Plan  · With the significant level of TSH elevation, initiate levothyroxine 50 mcg PO Qdaily in the early morning  · Check a free T4 level  · Recheck a TSH level in 4-6 weeks with her PCP    Toxic encephalopathy  Assessment & Plan  · Secondary to an intentional anticholinergic drug overdose along with benzodiazepine use  · The patient's mental status is improving          Tobacco use  Assessment & Plan  · Smoking cessation counseling   · Nicotine patch    Hypokalemia  Assessment & Plan  · Improved with potassium chloride supplementation treatment  · Follow the potassium level and magnesium level    Results from last 7 days   Lab Units 05/09/21  0433 05/08/21  0509 05/07/21  2230   SODIUM mmol/L 146* 140 138   POTASSIUM mmol/L 3 6 3 9 2 7*   CHLORIDE mmol/L 110* 105 101   CO2 mmol/L 26 25 26   BUN mg/dL 5 6 6   CREATININE mg/dL 0 62 0 65 0 65   CALCIUM mg/dL 8 6 9 3 9 8     Results from last 7 days   Lab Units 21  0433 21  0509   MAGNESIUM mg/dL 2 2 2 1         Depression with anxiety  Assessment & Plan  · Outpatient follow-up with Psychiatry and Psychiatry after her inpatient psychiatric hospitalization        VTE Pharmacologic Prophylaxis:   Pharmacologic: Enoxaparin (Lovenox)  Mechanical VTE Prophylaxis in Place: Yes    Patient Centered Rounds: I have performed bedside rounds with nursing staff today  Discussions with Specialists or Other Care Team Provider: I discussed the case with Toxicology  Education and Discussions with Family / Patient: I called the patient's mother, Calderon Mahajan, multiple times and left a voicemail message  Time Spent for Care: 30 minutes  More than 50% of total time spent on counseling and coordination of care as described above  Current Length of Stay: 0 day(s)    Current Patient Status: Observation   Certification Statement: The patient continues to require hospitalization for cardiopulmonary monitoring in the setting of a anticholinergic drug overdose along with a benzodiazepine overdose, for continuous IV fluids, for monitoring of her QT interval in the setting of QT prolongation, and for eventual transfer to an inpatient psychiatric facility  Code Status: Level 1 - Full Code      Subjective: The patient was seen and examined  The patient continues to be lethargic but does awaken to verbal stimuli  She is still confused at times  She complains of generalized abdominal pain and a decreased appetite  The patient also continues to experience intermittent, visual hallucinations  The patient admitted to me that her overdose of Benadryl and Xanax was an intentional suicide attempt      Objective:     Vitals:   Temp (24hrs), Av 2 °F (36 8 °C), Min:97 7 °F (36 5 °C), Max:98 7 °F (37 1 °C)    Temp:  [97 7 °F (36 5 °C)-98 7 °F (37 1 °C)] 97 7 °F (36 5 °C)  HR:  [67-97] 75  Resp:  [12-33] 14  BP: (106-152)/(59-90) 122/64  SpO2:  [95 %-100 %] 98 %  Body mass index is 27 26 kg/m²  Input and Output Summary (last 24 hours): Intake/Output Summary (Last 24 hours) at 5/9/2021 1947  Last data filed at 5/9/2021 1739  Gross per 24 hour   Intake 2921 67 ml   Output 1250 ml   Net 1671 67 ml       Physical Exam:     Physical Exam  General:  NAD, follows commands  HEENT:  NC/AT, mucous membranes dry  Neck:  Supple, No JVP elevation  CV:  + S1, + S2, RRR  Pulm:  Lung fields are CTA bilaterally  Abd:  Soft, Non-tender, Non-distended  Ext:  No clubbing/cyanosis/edema  Skin:  No rashes  Neuro:  Lethargic, awakens to verbal stimuli, confused at times  Psych:  Lethargic mood and affect      Additional Data:    Labs:    Results from last 7 days   Lab Units 05/09/21  0433   WBC Thousand/uL 7 65   HEMOGLOBIN g/dL 9 4*   HEMATOCRIT % 30 2*   PLATELETS Thousands/uL 248   NEUTROS PCT % 60   LYMPHS PCT % 30   MONOS PCT % 8   EOS PCT % 2     Results from last 7 days   Lab Units 05/09/21  0433   SODIUM mmol/L 146*   POTASSIUM mmol/L 3 6   CHLORIDE mmol/L 110*   CO2 mmol/L 26   BUN mg/dL 5   CREATININE mg/dL 0 62   ANION GAP mmol/L 10   CALCIUM mg/dL 8 6   ALBUMIN g/dL 3 9   TOTAL BILIRUBIN mg/dL 0 40   ALK PHOS U/L 46   ALT U/L 22   AST U/L 20   GLUCOSE RANDOM mg/dL 86                 Results from last 7 days   Lab Units 05/09/21  0433   PROCALCITONIN ng/ml <0 05           * I Have Reviewed All Lab Data Listed Above  * Additional Pertinent Lab Tests Reviewed:  Nella 66 Admission Reviewed      Recent Cultures (last 7 days):           Last 24 Hours Medication List:   Current Facility-Administered Medications   Medication Dose Route Frequency Provider Last Rate    acetaminophen  650 mg Oral Q6H PRN Sandy Lobe, DO      aluminum-magnesium hydroxide-simethicone  30 mL Oral Q4H PRN Sandy Lobe, DO      enoxaparin  40 mg Subcutaneous Q24H [facility], DO      Labetalol HCl  10 mg Intravenous Q4H PRN [facility], DO      [START ON 5/10/2021] levothyroxine  50 mcg Oral Early Morning [facility], DO      LORazepam  0 5 mg Intravenous Q4H PRN [facility], DO      multi-electrolyte  100 mL/hr Intravenous Continuous [facility],  mL/hr (05/09/21 1740)    nicotine  1 patch Transdermal Daily Jimenez Patiño PA-C          Today, Patient Was Seen By: [facility], DO    ** Please Note: Dictation voice to text software may have been used in the creation of this document   **

## 2021-05-09 NOTE — NURSING NOTE
Patient with continued agitation and hallucinations  Patient pulling at benito and IVs  Ange Gardiner PA-C made aware  Additional 1 mg IV Ativan ordered and administered @ 8905

## 2021-05-09 NOTE — ASSESSMENT & PLAN NOTE
· Improved with potassium chloride supplementation treatment  · Follow the potassium level and magnesium level    Results from last 7 days   Lab Units 05/09/21  0433 05/08/21  0509 05/07/21  2230   SODIUM mmol/L 146* 140 138   POTASSIUM mmol/L 3 6 3 9 2 7*   CHLORIDE mmol/L 110* 105 101   CO2 mmol/L 26 25 26   BUN mg/dL 5 6 6   CREATININE mg/dL 0 62 0 65 0 65   CALCIUM mg/dL 8 6 9 3 9 8     Results from last 7 days   Lab Units 05/09/21  0433 05/08/21  0509   MAGNESIUM mg/dL 2 2 2 1

## 2021-05-09 NOTE — ASSESSMENT & PLAN NOTE
· The patient admitted to me today, 05/09/2021, that she took many benadryl tablets along with a few xanax tablets in an attempt to commit suicide  · The patient cannot leave AMA (against medical advice)  · The patient is willing to sign a 201 at this time, but a 302 will be enacted if she decides not to sign a 201  · The patient was seen in consultation by Toxicology  · The patient did not receive by physostigmine in the emergency department  · Utilize PRN IV ativan for agitation  · Continue Isolyte IV fluids at 100 ml/hr  · Received magnesium sulfate 2 grams IV x 1 dose on 05/08/2021  · The patient is only tolerating minimal PO intake at this time  · Per Toxicology for the patient to be medically cleared to be transferred to an inpatient psychiatric unit, the patient's prolonged QT interval must be normalized, her mentation must be at her baseline, her vital signs must be normalized, the patient must be ambulatory    · Recheck an EKG on 05/10/2021  · Maintain a continual 1:1 observation  · Suicide precautions

## 2021-05-10 LAB
25(OH)D3 SERPL-MCNC: 24.6 NG/ML (ref 30–100)
ALBUMIN SERPL BCP-MCNC: 3.8 G/DL (ref 3.5–5)
ALP SERPL-CCNC: 46 U/L (ref 46–116)
ALT SERPL W P-5'-P-CCNC: 24 U/L (ref 12–78)
ANION GAP SERPL CALCULATED.3IONS-SCNC: 9 MMOL/L (ref 4–13)
AST SERPL W P-5'-P-CCNC: 15 U/L (ref 5–45)
ATRIAL RATE: 101 BPM
ATRIAL RATE: 110 BPM
ATRIAL RATE: 118 BPM
ATRIAL RATE: 147 BPM
BASOPHILS # BLD AUTO: 0.02 THOUSANDS/ΜL (ref 0–0.1)
BASOPHILS NFR BLD AUTO: 0 % (ref 0–1)
BILIRUB SERPL-MCNC: 0.46 MG/DL (ref 0.2–1)
BUN SERPL-MCNC: 7 MG/DL (ref 5–25)
CALCIUM SERPL-MCNC: 8.4 MG/DL (ref 8.3–10.1)
CHLORIDE SERPL-SCNC: 105 MMOL/L (ref 100–108)
CO2 SERPL-SCNC: 25 MMOL/L (ref 21–32)
CREAT SERPL-MCNC: 0.59 MG/DL (ref 0.6–1.3)
EOSINOPHIL # BLD AUTO: 0.14 THOUSAND/ΜL (ref 0–0.61)
EOSINOPHIL NFR BLD AUTO: 2 % (ref 0–6)
ERYTHROCYTE [DISTWIDTH] IN BLOOD BY AUTOMATED COUNT: 14.6 % (ref 11.6–15.1)
FERRITIN SERPL-MCNC: 10 NG/ML (ref 8–388)
FOLATE SERPL-MCNC: 11.9 NG/ML (ref 3.1–17.5)
GFR SERPL CREATININE-BSD FRML MDRD: 130 ML/MIN/1.73SQ M
GLUCOSE P FAST SERPL-MCNC: 72 MG/DL (ref 65–99)
GLUCOSE SERPL-MCNC: 72 MG/DL (ref 65–140)
HCT VFR BLD AUTO: 29.6 % (ref 34.8–46.1)
HGB BLD-MCNC: 9.3 G/DL (ref 11.5–15.4)
HIV 1+2 AB+HIV1 P24 AG SERPL QL IA: NORMAL
IMM GRANULOCYTES # BLD AUTO: 0.03 THOUSAND/UL (ref 0–0.2)
IMM GRANULOCYTES NFR BLD AUTO: 1 % (ref 0–2)
IRON SATN MFR SERPL: 24 %
IRON SERPL-MCNC: 83 UG/DL (ref 50–170)
LACTATE SERPL-SCNC: 0.6 MMOL/L (ref 0.5–2)
LYMPHOCYTES # BLD AUTO: 2.39 THOUSANDS/ΜL (ref 0.6–4.47)
LYMPHOCYTES NFR BLD AUTO: 40 % (ref 14–44)
MAGNESIUM SERPL-MCNC: 2.1 MG/DL (ref 1.6–2.6)
MCH RBC QN AUTO: 26.3 PG (ref 26.8–34.3)
MCHC RBC AUTO-ENTMCNC: 31.4 G/DL (ref 31.4–37.4)
MCV RBC AUTO: 84 FL (ref 82–98)
MONOCYTES # BLD AUTO: 0.38 THOUSAND/ΜL (ref 0.17–1.22)
MONOCYTES NFR BLD AUTO: 6 % (ref 4–12)
NEUTROPHILS # BLD AUTO: 2.97 THOUSANDS/ΜL (ref 1.85–7.62)
NEUTS SEG NFR BLD AUTO: 51 % (ref 43–75)
NRBC BLD AUTO-RTO: 0 /100 WBCS
P AXIS: 60 DEGREES
P AXIS: 64 DEGREES
P AXIS: 70 DEGREES
P AXIS: 70 DEGREES
PHOSPHATE SERPL-MCNC: 2.9 MG/DL (ref 2.7–4.5)
PLATELET # BLD AUTO: 229 THOUSANDS/UL (ref 149–390)
PMV BLD AUTO: 10 FL (ref 8.9–12.7)
POTASSIUM SERPL-SCNC: 3.4 MMOL/L (ref 3.5–5.3)
PR INTERVAL: 132 MS
PR INTERVAL: 138 MS
PR INTERVAL: 146 MS
PR INTERVAL: 152 MS
PROCALCITONIN SERPL-MCNC: <0.05 NG/ML
PROT SERPL-MCNC: 6.8 G/DL (ref 6.4–8.2)
QRS AXIS: 37 DEGREES
QRS AXIS: 53 DEGREES
QRS AXIS: 54 DEGREES
QRS AXIS: 69 DEGREES
QRSD INTERVAL: 86 MS
QRSD INTERVAL: 88 MS
QRSD INTERVAL: 90 MS
QRSD INTERVAL: 94 MS
QT INTERVAL: 264 MS
QT INTERVAL: 368 MS
QT INTERVAL: 378 MS
QT INTERVAL: 380 MS
QTC INTERVAL: 413 MS
QTC INTERVAL: 492 MS
QTC INTERVAL: 511 MS
QTC INTERVAL: 515 MS
RBC # BLD AUTO: 3.54 MILLION/UL (ref 3.81–5.12)
SODIUM SERPL-SCNC: 139 MMOL/L (ref 136–145)
T WAVE AXIS: 58 DEGREES
T WAVE AXIS: 74 DEGREES
T WAVE AXIS: 78 DEGREES
T WAVE AXIS: 94 DEGREES
TIBC SERPL-MCNC: 346 UG/DL (ref 250–450)
VENTRICULAR RATE: 101 BPM
VENTRICULAR RATE: 110 BPM
VENTRICULAR RATE: 118 BPM
VENTRICULAR RATE: 147 BPM
VIT B12 SERPL-MCNC: 199 PG/ML (ref 100–900)
WBC # BLD AUTO: 5.93 THOUSAND/UL (ref 4.31–10.16)

## 2021-05-10 PROCEDURE — 82728 ASSAY OF FERRITIN: CPT | Performed by: INTERNAL MEDICINE

## 2021-05-10 PROCEDURE — 84145 PROCALCITONIN (PCT): CPT | Performed by: INTERNAL MEDICINE

## 2021-05-10 PROCEDURE — 97530 THERAPEUTIC ACTIVITIES: CPT

## 2021-05-10 PROCEDURE — 85025 COMPLETE CBC W/AUTO DIFF WBC: CPT | Performed by: INTERNAL MEDICINE

## 2021-05-10 PROCEDURE — 82607 VITAMIN B-12: CPT | Performed by: INTERNAL MEDICINE

## 2021-05-10 PROCEDURE — 84100 ASSAY OF PHOSPHORUS: CPT | Performed by: INTERNAL MEDICINE

## 2021-05-10 PROCEDURE — 99233 SBSQ HOSP IP/OBS HIGH 50: CPT | Performed by: INTERNAL MEDICINE

## 2021-05-10 PROCEDURE — 93005 ELECTROCARDIOGRAM TRACING: CPT

## 2021-05-10 PROCEDURE — 92610 EVALUATE SWALLOWING FUNCTION: CPT

## 2021-05-10 PROCEDURE — 80053 COMPREHEN METABOLIC PANEL: CPT | Performed by: INTERNAL MEDICINE

## 2021-05-10 PROCEDURE — 83735 ASSAY OF MAGNESIUM: CPT | Performed by: INTERNAL MEDICINE

## 2021-05-10 PROCEDURE — 93010 ELECTROCARDIOGRAM REPORT: CPT | Performed by: INTERNAL MEDICINE

## 2021-05-10 PROCEDURE — 82746 ASSAY OF FOLIC ACID SERUM: CPT | Performed by: INTERNAL MEDICINE

## 2021-05-10 PROCEDURE — 83550 IRON BINDING TEST: CPT | Performed by: INTERNAL MEDICINE

## 2021-05-10 PROCEDURE — 83605 ASSAY OF LACTIC ACID: CPT | Performed by: INTERNAL MEDICINE

## 2021-05-10 PROCEDURE — 97116 GAIT TRAINING THERAPY: CPT

## 2021-05-10 PROCEDURE — 83540 ASSAY OF IRON: CPT | Performed by: INTERNAL MEDICINE

## 2021-05-10 PROCEDURE — 99203 OFFICE O/P NEW LOW 30 MIN: CPT | Performed by: PSYCHIATRY & NEUROLOGY

## 2021-05-10 PROCEDURE — 82306 VITAMIN D 25 HYDROXY: CPT | Performed by: INTERNAL MEDICINE

## 2021-05-10 RX ORDER — POTASSIUM CHLORIDE 20 MEQ/1
40 TABLET, EXTENDED RELEASE ORAL ONCE
Status: COMPLETED | OUTPATIENT
Start: 2021-05-10 | End: 2021-05-10

## 2021-05-10 RX ORDER — POLYETHYLENE GLYCOL 3350 17 G/17G
17 POWDER, FOR SOLUTION ORAL DAILY PRN
Status: DISCONTINUED | OUTPATIENT
Start: 2021-05-10 | End: 2021-05-11 | Stop reason: HOSPADM

## 2021-05-10 RX ORDER — POTASSIUM CHLORIDE 20 MEQ/1
20 TABLET, EXTENDED RELEASE ORAL ONCE
Status: COMPLETED | OUTPATIENT
Start: 2021-05-10 | End: 2021-05-10

## 2021-05-10 RX ADMIN — NICOTINE 7 MG/24 HR DAILY TRANSDERMAL PATCH 1 PATCH: at 09:14

## 2021-05-10 RX ADMIN — LEVOTHYROXINE SODIUM 50 MCG: 50 TABLET ORAL at 05:04

## 2021-05-10 RX ADMIN — SODIUM CHLORIDE, SODIUM GLUCONATE, SODIUM ACETATE, POTASSIUM CHLORIDE, MAGNESIUM CHLORIDE, SODIUM PHOSPHATE, DIBASIC, AND POTASSIUM PHOSPHATE 100 ML/HR: .53; .5; .37; .037; .03; .012; .00082 INJECTION, SOLUTION INTRAVENOUS at 02:48

## 2021-05-10 RX ADMIN — POTASSIUM CHLORIDE 40 MEQ: 1500 TABLET, EXTENDED RELEASE ORAL at 09:13

## 2021-05-10 RX ADMIN — ACETAMINOPHEN 650 MG: 325 TABLET, FILM COATED ORAL at 03:12

## 2021-05-10 RX ADMIN — SODIUM CHLORIDE, SODIUM GLUCONATE, SODIUM ACETATE, POTASSIUM CHLORIDE, MAGNESIUM CHLORIDE, SODIUM PHOSPHATE, DIBASIC, AND POTASSIUM PHOSPHATE 100 ML/HR: .53; .5; .37; .037; .03; .012; .00082 INJECTION, SOLUTION INTRAVENOUS at 12:45

## 2021-05-10 RX ADMIN — SODIUM CHLORIDE, SODIUM GLUCONATE, SODIUM ACETATE, POTASSIUM CHLORIDE, MAGNESIUM CHLORIDE, SODIUM PHOSPHATE, DIBASIC, AND POTASSIUM PHOSPHATE 100 ML/HR: .53; .5; .37; .037; .03; .012; .00082 INJECTION, SOLUTION INTRAVENOUS at 22:23

## 2021-05-10 RX ADMIN — ENOXAPARIN SODIUM 40 MG: 40 INJECTION SUBCUTANEOUS at 15:15

## 2021-05-10 RX ADMIN — POTASSIUM CHLORIDE 20 MEQ: 1500 TABLET, EXTENDED RELEASE ORAL at 15:15

## 2021-05-10 NOTE — PLAN OF CARE
Problem: Prexisting or High Potential for Compromised Skin Integrity  Goal: Skin integrity is maintained or improved  Description: INTERVENTIONS:  - Identify patients at risk for skin breakdown  - Assess and monitor skin integrity  - Assess and monitor nutrition and hydration status  - Monitor labs   - Assess for incontinence   - Turn and reposition patient  - Assist with mobility/ambulation  - Relieve pressure over bony prominences  - Avoid friction and shearing  - Provide appropriate hygiene as needed including keeping skin clean and dry  - Evaluate need for skin moisturizer/barrier cream  - Collaborate with interdisciplinary team   - Patient/family teaching  - Consider wound care consult   Outcome: Progressing     Problem: Potential for Falls  Goal: Patient will remain free of falls  Description: INTERVENTIONS:  - Assess patient frequently for physical needs  -  Identify cognitive and physical deficits and behaviors that affect risk of falls    -  [place] fall precautions as indicated by assessment   - Educate patient/family on patient safety including physical limitations  - Instruct patient to call for assistance with activity based on assessment  - Modify environment to reduce risk of injury  - Consider OT/PT consult to assist with strengthening/mobility  Outcome: Progressing     Problem: PAIN - ADULT  Goal: Verbalizes/displays adequate comfort level or baseline comfort level  Description: Interventions:  - Encourage patient to monitor pain and request assistance  - Assess pain using appropriate pain scale  - Administer analgesics based on type and severity of pain and evaluate response  - Implement non-pharmacological measures as appropriate and evaluate response  - Consider cultural and social influences on pain and pain management  - Notify physician/advanced practitioner if interventions unsuccessful or patient reports new pain  Outcome: Progressing     Problem: INFECTION - ADULT  Goal: Absence or prevention of progression during hospitalization  Description: INTERVENTIONS:  - Assess and monitor for signs and symptoms of infection  - Monitor lab/diagnostic results  - Monitor all insertion sites, i e  indwelling lines, tubes, and drains  - Monitor endotracheal if appropriate and nasal secretions for changes in amount and color  - [place] appropriate cooling/warming therapies per order  - Administer medications as ordered  - Instruct and encourage patient and family to use good hand hygiene technique  - Identify and instruct in appropriate isolation precautions for identified infection/condition  Outcome: Progressing  Goal: Absence of fever/infection during neutropenic period  Description: INTERVENTIONS:  - Monitor WBC    Outcome: Progressing     Problem: SAFETY ADULT  Goal: Patient will remain free of falls  Description: INTERVENTIONS:  - Assess patient frequently for physical needs  -  Identify cognitive and physical deficits and behaviors that affect risk of falls    -  [place] fall precautions as indicated by assessment   - Educate patient/family on patient safety including physical limitations  - Instruct patient to call for assistance with activity based on assessment  - Modify environment to reduce risk of injury  - Consider OT/PT consult to assist with strengthening/mobility  Outcome: Progressing  Goal: Maintain or return to baseline ADL function  Description: INTERVENTIONS:  -  Assess patient's ability to carry out ADLs; assess patient's baseline for ADL function and identify physical deficits which impact ability to perform ADLs (bathing, care of mouth/teeth, toileting, grooming, dressing, etc )  - Assess/evaluate cause of self-care deficits   - Assess range of motion  - Assess patient's mobility; develop plan if impaired  - Assess patient's need for assistive devices and provide as appropriate  - Encourage maximum independence but intervene and supervise when necessary  - Involve family in performance of ADLs  - Assess for home care needs following discharge   - Consider OT consult to assist with ADL evaluation and planning for discharge  - Provide patient education as appropriate  Outcome: Progressing  Goal: Maintain or return mobility status to optimal level  Description: INTERVENTIONS:  - Assess patient's baseline mobility status (ambulation, transfers, stairs, etc )    - Identify cognitive and physical deficits and behaviors that affect mobility  - Identify mobility aids required to assist with transfers and/or ambulation (gait belt, sit-to-stand, lift, walker, cane, etc )  - [place] fall precautions as indicated by assessment  - Record patient progress and toleration of activity level on Mobility SBAR; progress patient to next Phase/Stage  - Instruct patient to call for assistance with activity based on assessment  - Consider rehabilitation consult to assist with strengthening/weightbearing, etc   Outcome: Progressing     Problem: DISCHARGE PLANNING  Goal: Discharge to home or other facility with appropriate resources  Description: INTERVENTIONS:  - Identify barriers to discharge w/patient and caregiver  - Arrange for needed discharge resources and transportation as appropriate  - Identify discharge learning needs (meds, wound care, etc )  - Arrange for interpretive services to assist at discharge as needed  - Refer to Case Management Department for coordinating discharge planning if the patient needs post-hospital services based on physician/advanced practitioner order or complex needs related to functional status, cognitive ability, or social support system  Outcome: Progressing     Problem: Knowledge Deficit  Goal: Patient/family/caregiver demonstrates understanding of disease process, treatment plan, medications, and discharge instructions  Description: Complete learning assessment and assess knowledge base    Interventions:  - Provide teaching at level of understanding  - Provide teaching via preferred learning methods  Outcome: Progressing     Problem: NEUROSENSORY - ADULT  Goal: Achieves stable or improved neurological status  Description: INTERVENTIONS  - Monitor and report changes in neurological status  - Monitor vital signs such as temperature, blood pressure, glucose, and any other labs ordered   - Initiate measures to prevent increased intracranial pressure  - Monitor for seizure activity and implement precautions if appropriate      Outcome: Progressing  Goal: Achieves maximal functionality and self care  Description: INTERVENTIONS  - Monitor swallowing and airway patency with patient fatigue and changes in neurological status  - Encourage and assist patient to increase activity and self care     - Encourage visually impaired, hearing impaired and aphasic patients to use assistive/communication devices  Outcome: Progressing     Problem: CARDIOVASCULAR - ADULT  Goal: Maintains optimal cardiac output and hemodynamic stability  Description: INTERVENTIONS:  - Monitor I/O, vital signs and rhythm  - Monitor for S/S and trends of decreased cardiac output  - Administer and titrate ordered vasoactive medications to optimize hemodynamic stability  - Assess quality of pulses, skin color and temperature  - Assess for signs of decreased coronary artery perfusion  - Instruct patient to report change in severity of symptoms  Outcome: Progressing  Goal: Absence of cardiac dysrhythmias or at baseline rhythm  Description: INTERVENTIONS:  - Continuous cardiac monitoring, vital signs, obtain 12 lead EKG if ordered  - Administer antiarrhythmic and heart rate control medications as ordered  - Monitor electrolytes and administer replacement therapy as ordered  Outcome: Progressing     Problem: RESPIRATORY - ADULT  Goal: Achieves optimal ventilation and oxygenation  Description: INTERVENTIONS:  - Assess for changes in respiratory status  - Assess for changes in mentation and behavior  - Position to facilitate oxygenation and minimize respiratory effort  - Oxygen administered by appropriate delivery if ordered  - Initiate smoking cessation education as indicated  - Encourage broncho-pulmonary hygiene including cough, deep breathe, Incentive Spirometry  - Assess the need for suctioning and aspirate as needed  - Assess and instruct to report SOB or any respiratory difficulty  - Respiratory Therapy support as indicated  Outcome: Progressing     Problem: METABOLIC, FLUID AND ELECTROLYTES - ADULT  Goal: Electrolytes maintained within normal limits  Description: INTERVENTIONS:  - Monitor labs and assess patient for signs and symptoms of electrolyte imbalances  - Administer electrolyte replacement as ordered  - Monitor response to electrolyte replacements, including repeat lab results as appropriate  - Instruct patient on fluid and nutrition as appropriate  Outcome: Progressing  Goal: Fluid balance maintained  Description: INTERVENTIONS:  - Monitor labs   - Monitor I/O and WT  - Instruct patient on fluid and nutrition as appropriate  - Assess for signs & symptoms of volume excess or deficit  Outcome: Progressing  Goal: Glucose maintained within target range  Description: INTERVENTIONS:  - Monitor Blood Glucose as ordered  - Assess for signs and symptoms of hyperglycemia and hypoglycemia  - Administer ordered medications to maintain glucose within target range  - Assess nutritional intake and initiate nutrition service referral as needed  Outcome: Progressing     Problem: SKIN/TISSUE INTEGRITY - ADULT  Goal: Skin integrity remains intact  Description: INTERVENTIONS  - Identify patients at risk for skin breakdown  - Assess and monitor skin integrity  - Assess and monitor nutrition and hydration status  - Monitor labs (i e  albumin)  - Assess for incontinence   - Turn and reposition patient  - Assist with mobility/ambulation  - Relieve pressure over bony prominences  - Avoid friction and shearing  - Provide appropriate hygiene as needed including keeping skin clean and dry  - Evaluate need for skin moisturizer/barrier cream  - Collaborate with interdisciplinary team (i e  Nutrition, Rehabilitation, etc )   - Patient/family teaching  Outcome: Progressing  Goal: Incision(s), wounds(s) or drain site(s) healing without S/S of infection  Description: INTERVENTIONS  - Assess and document risk factors for skin impairment   - Assess and document dressing, incision, wound bed, drain sites and surrounding tissue  - Consider nutrition services referral as needed  - Oral mucous membranes remain intact  - Provide patient/ family education  Outcome: Progressing  Goal: Oral mucous membranes remain intact  Description: INTERVENTIONS  - Assess oral mucosa and hygiene practices  - Implement preventative oral hygiene regimen  - Implement oral medicated treatments as ordered  - Initiate Nutrition services referral as needed  Outcome: Progressing     Problem: HEMATOLOGIC - ADULT  Goal: Maintains hematologic stability  Description: INTERVENTIONS  - Assess for signs and symptoms of bleeding or hemorrhage  - Monitor labs  - Administer supportive blood products/factors as ordered and appropriate  Outcome: Progressing     Problem: MUSCULOSKELETAL - ADULT  Goal: Maintain or return mobility to safest level of function  Description: INTERVENTIONS:  - Assess patient's ability to carry out ADLs; assess patient's baseline for ADL function and identify physical deficits which impact ability to perform ADLs (bathing, care of mouth/teeth, toileting, grooming, dressing, etc )  - Assess/evaluate cause of self-care deficits   - Assess range of motion  - Assess patient's mobility  - Assess patient's need for assistive devices and provide as appropriate  - Encourage maximum independence but intervene and supervise when necessary  - Involve family in performance of ADLs  - Assess for home care needs following discharge   - Consider OT consult to assist with ADL evaluation and planning for discharge  - Provide patient education as appropriate  Outcome: Progressing  Goal: Maintain proper alignment of affected body part  Description: INTERVENTIONS:  - Support, maintain and protect limb and body alignment  - Provide patient/ family with appropriate education  Outcome: Progressing

## 2021-05-10 NOTE — CASE MANAGEMENT
Pt's mom is in the room and requesting pt go to [facility] in [facility]  BARRY called [facility] [phone] who requested we fax over the 201/and clinicals to [phone]  Cm faxed over requested information, waiting to hear back
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
## 2021-05-10 NOTE — NURSING NOTE
Patient has a pickup time tomorrow of 12:45 to [facility]  Visitors in her room googled this facility while RN was in the room and became very angry toward RN about the google ratings  RN explained that per Case Management note, the 201/clinicals were faxed over to [facility] as pt's mom requested  Visitors in the room then seemed very confused as to why the pt needs to be placed at a facility  Visitors aggressively state that the OD was unintentional and that the pt would never intentionally hurt herself  Visitors then asked pt why she signed the 201 and instructed her not to let anyone 302 her  RN attempted to reassure family/visitors that Case Management was working on placement to [facility] however they were gone for the day  Visitor, who called herself "future mother-in-law," instructed the RN to "work harder " Supervisor Mady Francois notified

## 2021-05-10 NOTE — PROGRESS NOTES
[address]  Progress Note - Grace Pisano 1995, 23 y o  female MRN: [id number]  Unit/Bed#:  Encounter: [id number]  Primary Care Provider: David Gastelum PA-C   Date and time admitted to hospital: 5/7/2021 10:14 PM    * Anticholinergic drug overdose  Assessment & Plan  Intentional OD on diphenhydramine and alprazolam   This represented a suicide attempt  · Patient is currently a 12 - will require a 302 enacted if she decides to leave  She is not able to sign out AMA  · The patient was seen in consultation by Toxicology  · The patient did not receive by physostigmine in the ED  · Was maintained on IV fluids  · Continue 1:1 observation  · Evaluated by behavioral health - accepted in transfer to the [address] [place] [address] psychiatric facility  · Continue to maintain on Suicide precautions  · Per Toxicology recommendations, the patient is now considered medically cleared for transfer to psych unit:  · Prolonged QT interval has normalized  · Mental status appears at baseline  · Vital signs remain stable  · Patient is ambulatory  Toxic encephalopathy  Assessment & Plan  Secondary to an intentional anticholinergic drug overdose along with benzodiazepine use  Mental status is at baseline, with encephalopathy resolved  Elevated TSH  Assessment & Plan  With initial TSH elevation in setting of acute illness - free T4 normal X 2  This very likely represents TSH fluctuation and sick euthyroid syndrome in the setting of acute illness and drug overdose  Will discontinue replacement therapy, and recommend repeat TSH in a few weeks when no longer ill  Prolonged QT interval  Assessment & Plan  Received magnesium sulfate 2 grams IV x 1 dose on 05/08/2021  QT interval has normalized      Depression with anxiety  Assessment & Plan  Outpatient follow-up with Psychiatry and Psychiatry after her inpatient psychiatric hospitalization    Anemia  Assessment & Plan  Confirmed iron deficiency in young menstruating female  Urinary retention  Assessment & Plan  Secondary to anticholinergic drug overdose  Serna catheter was removed on 2021  No evidence of further retention  Tobacco use  Assessment & Plan  Nicotine patch remains in place  VTE Pharmacologic Prophylaxis:   Pharmacologic: Enoxaparin (Lovenox)  Mechanical VTE Prophylaxis in Place: Yes    Patient Centered Rounds: I have performed bedside rounds with nursing staff today  Discussions with Specialists or Other Care Team Provider: Review of behavioral health consultation note  Education and Discussions with Family / Patient:  Yes    Time Spent for Care: 30 minutes  More than 50% of total time spent on counseling and coordination of care as described above  Current Length of Stay: 0 day(s)    Current Patient Status: Inpatient   Certification Statement: The patient will continue to require additional inpatient hospital stay due to Need for psychiatric placement  Discharge Plan:  TBD - likely discharge 2021    Code Status: Level 1 - Full Code      Subjective:   Patient reports chronic constipation, unchanged from her baseline  Has no other complaints this morning  Appears to be at baseline mental status  QT corrected minimally elevated only this morning, normalized by this afternoon  No overnight events reported  Remains afebrile  Objective:     Vitals:   Temp (24hrs), Av 8 °F (36 6 °C), Min:97 5 °F (36 4 °C), Max:98 3 °F (36 8 °C)    Temp:  [97 5 °F (36 4 °C)-98 3 °F (36 8 °C)] 98 3 °F (36 8 °C)  HR:  [] 86  Resp:  [13-25] 21  BP: (101-133)/(55-81) 130/77  SpO2:  [95 %-98 %] 96 %  Body mass index is 27 14 kg/m²  Input and Output Summary (last 24 hours):        Intake/Output Summary (Last 24 hours) at 5/10/2021 1542  Last data filed at 5/10/2021 1442  Gross per 24 hour   Intake 3301 67 ml   Output 2650 ml   Net 651 67 ml       Physical Exam:     Physical Exam  Constitutional:       Appearance: Normal appearance  She is normal weight  HENT:      Mouth/Throat:      Mouth: Mucous membranes are moist    Eyes:      Extraocular Movements: Extraocular movements intact  Neck:      Musculoskeletal: Neck supple  Cardiovascular:      Rate and Rhythm: Normal rate and regular rhythm  Heart sounds: No murmur  No gallop  Pulmonary:      Effort: Pulmonary effort is normal  No respiratory distress  Breath sounds: Normal breath sounds  No wheezing, rhonchi or rales  Abdominal:      General: Abdomen is flat  Bowel sounds are normal  There is no distension  Palpations: Abdomen is soft  Tenderness: There is no abdominal tenderness  There is no guarding or rebound  Musculoskeletal:         General: No swelling or tenderness  Skin:     General: Skin is warm and dry  Neurological:      General: No focal deficit present  Mental Status: She is alert and oriented to person, place, and time  Psychiatric:         Thought Content: Thought content normal         Additional Data:     Labs:    Results from last 7 days   Lab Units 05/10/21  0451   WBC Thousand/uL 5 93   HEMOGLOBIN g/dL 9 3*   HEMATOCRIT % 29 6*   PLATELETS Thousands/uL 229   NEUTROS PCT % 51   LYMPHS PCT % 40   MONOS PCT % 6   EOS PCT % 2     Results from last 7 days   Lab Units 05/10/21  0451   SODIUM mmol/L 139   POTASSIUM mmol/L 3 4*   CHLORIDE mmol/L 105   CO2 mmol/L 25   BUN mg/dL 7   CREATININE mg/dL 0 59*   ANION GAP mmol/L 9   CALCIUM mg/dL 8 4   ALBUMIN g/dL 3 8   TOTAL BILIRUBIN mg/dL 0 46   ALK PHOS U/L 46   ALT U/L 24   AST U/L 15   GLUCOSE RANDOM mg/dL 72                 Results from last 7 days   Lab Units 05/10/21  0451 05/09/21  0433   LACTIC ACID mmol/L 0 6  --    PROCALCITONIN ng/ml <0 05 <0 05           * I Have Reviewed All Lab Data Listed Above  * Additional Pertinent Lab Tests Reviewed:  [facility] Admission Reviewed    Imaging:    Imaging Reports Reviewed Today Include: CT Head     Recent Cultures (last 7 days):           Last 24 Hours Medication List:   Current Facility-Administered Medications   Medication Dose Route Frequency Provider Last Rate    acetaminophen  650 mg Oral Q6H PRN [employer], DO      aluminum-magnesium hydroxide-simethicone  30 mL Oral Q4H PRN [employer], DO      enoxaparin  40 mg Subcutaneous Q24H [employer], DO      Labetalol HCl  10 mg Intravenous Q4H PRN [employer], DO      LORazepam  0 5 mg Intravenous Q4H PRN [employer], DO      multi-electrolyte  100 mL/hr Intravenous Continuous [employer],  mL/hr (05/10/21 1245)    nicotine  1 patch Transdermal Daily Jimenez Patiño PA-C      polyethylene glycol  17 g Oral Daily PRN Paula Lance MD          Today, Patient Was Seen By: Paula Lance MD    ** Please Note: Dictation voice to text software may have been used in the creation of this document   **

## 2021-05-10 NOTE — ASSESSMENT & PLAN NOTE
Intentional OD on diphenhydramine and alprazolam   This represented a suicide attempt  · Patient is currently a 12 - will require a 302 enacted if she decides to leave  She is not able to sign out AMA  · The patient was seen in consultation by Toxicology  · The patient did not receive by physostigmine in the ED  · Was maintained on IV fluids  · Continue 1:1 observation  · Evaluated by behavioral health - accepted in transfer to the [address] [place] [address] psychiatric facility  · Continue to maintain on Suicide precautions  · Per Toxicology recommendations, the patient is now considered medically cleared for transfer to psych unit:  · Prolonged QT interval has normalized  · Mental status appears at baseline  · Vital signs remain stable  · Patient is ambulatory

## 2021-05-10 NOTE — OCCUPATIONAL THERAPY NOTE
OccupationalTherapy Progress Note     Patient Name: Aaliyah Maza  [id number]'[id number] Date: 5/10/2021  Problem List  Principal Problem:    Anticholinergic drug overdose  Active Problems:    Depression with anxiety    Hypokalemia    Tobacco use    Toxic encephalopathy    Elevated TSH    Prolonged QT interval    Abdominal pain    Hypernatremia    Suicide attempt ([facility] [address] )    Anemia    Urinary retention            05/10/21 1154   OT Last Visit   OT Visit Date 05/10/21   Restrictions/Precautions   Weight Bearing Precautions Per Order No   Other Precautions 1:1;Chair Alarm;Multiple lines;Telemetry; Fall Risk   Pain Assessment   Pain Assessment Tool Pain Assessment not indicated - pt denies pain   Pain Score No Pain   ADL   Toileting Assistance  5  Supervision/Setup   Toileting Deficit Supervison/safety;Verbal cueing   Bed Mobility   Supine to Sit 5  Supervision   Additional items Assist x 1; Increased time required;Verbal cues; Bedrails   Transfers   Sit to Stand 5  Supervision   Additional items Increased time required   Stand to Sit 5  Supervision   Additional items Increased time required   Additional Comments RW   Functional Mobility   Functional Mobility 5  Supervision   Additional Comments Pt completed standing balance/functional mobility around room and hallways with RW and S with no significant LOB with distance limited by fatigue and pt feeling dizzy     Additional items Rolling walker   Toilet Transfers   Toilet Transfer From Rolling walker   Toilet Transfer Type To and from   Toilet Transfer to [employer] Transfers Comments RW   Cognition   Overall Cognitive Status Impaired   Arousal/Participation Cooperative   Attention Within functional limits   Orientation Level Oriented X4   Memory Decreased recall of recent events   Following Commands Follows one step commands without difficulty   Activity Tolerance   Activity Tolerance Patient limited by fatigue  (Pt limited by dizziness)   Assessment   Assessment Patient participated in Skilled OT session this date with interventions consisting of  therapeutic activities to: increase activity tolerance   Patient agreeable to OT treatment session, upon arrival patient was found supine in bed  Pt completed supine to sit txfrs with use of side rail and S, sit to stand txfrs from bed with S, toilet txfr S with RW, toileting S  Pt completed standing balance/functional mobility around room and hallways with RW and S and no LOB throughout with distance limited by fatigue and dizziness  To increase posture, dynamic standing balance, balance during self cares, use of BUE  Pt left in fidel chair with all needs in reach and 1:1 present  Patient requiring verbal cues for safety and verbal cues for correct technique  Patient continues to be functioning below baseline level, occupational performance remains limited secondary to factors listed above and increased risk for falls and injury  From OT standpoint, recommendation at time of d/c would be No rehabilitation needs  Patient to benefit from continued Occupational Therapy treatment while in the hospital to address deficits as defined above and maximize level of functional independence with ADLs and functional mobility  The patient's raw score on the AM-PAC Daily Activity inpatient short form is 22, standardized score is 47 1, greater than 39 4  Patients at this level are likely to benefit from discharge to home  Please refer to the recommendation of the Occupational Therapist for safe discharge planning     Plan   Goal Expiration Date 05/23/21   OT Treatment Day 1   OT Frequency 3-5x/wk   Recommendation   OT Discharge Recommendation No rehabilitation needs   AM-PAC Daily Activity Inpatient   Lower Body Dressing 3   Bathing 3   Toileting 4   Upper Body Dressing 4   Grooming 4   Eating 4   Daily Activity Raw Score 22   Daily Activity Standardized Score (Calc for Raw Score >=11) 47  1   AM-PAC Applied Cognition Inpatient   Following a Speech/Presentation 3   Understanding Ordinary Conversation 3   Taking Medications 3   Remembering Where Things Are Placed or Put Away 3   Remembering List of 4-5 Errands 3   Taking Care of Complicated Tasks 3   Applied Cognition Raw Score 18   Applied Cognition Standardized Score 38 07     Pt left in fidel chair with all needs in reach and 1:1 present

## 2021-05-10 NOTE — PHYSICAL THERAPY NOTE
PHYSICAL THERAPY NOTE          Patient Name: Claudell Driver  RTGRN'U Date: 5/10/2021      05/10/21 1140   Note Type   Note Type Treatment   Pain Assessment   Pain Score No Pain   Restrictions/Precautions   Other Precautions   (1:1;Chair Alarm;Multiple lines;Telemetry; Fall Risk)   Cognition   Overall Cognitive Status Impaired   Arousal/Participation Cooperative;Lethargic   Following Commands Follows one step commands without difficulty   Subjective   Subjective c/o feeling dizzy when ambulating   Bed Mobility   Supine to Sit 5  Supervision   Additional items Increased time required;Verbal cues   Transfers   Sit to Stand 5  Supervision   Additional items Increased time required   Stand to Sit 5  Supervision   Additional items Increased time required   Toilet transfer 5  Supervision   Additional items Increased time required   Additional Comments Able to manage clothing and hygiene   Ambulation/Elevation   Gait pattern Excessively slow; Short stride   Gait Assistance 5  Supervision   Assistive Device Rolling walker   Distance 75'   Balance   Ambulatory Fair   Endurance Deficit   Endurance Deficit Yes   Activity Tolerance   Activity Tolerance Patient limited by fatigue   Assessment   Prognosis Good   Problem List Decreased strength;Decreased endurance; Impaired balance;Decreased mobility   Assessment Pt  seen for PT treatment session this date with interventions consisting of bed mobility, transfers and  gait training w/ emphasis on improving pt's ability to ambulate  Pt  Currently performing  tx and ambulation at (SUP ) x 1 level of function  The patient's AM-PAC Basic Mobility Inpatient Short Form Raw Score is 21, Standardized Score is 45 55  A standardized score greater than 42 9 suggests the patient may benefit from discharge to home  Please also refer to physical therapy recommendation for safe DC planning    In comparison to previous session, Pt  With improvements in activity tolerance  Decreased gait speed with no LOB  Pt is in need of continued activity in PT to improve strength balance endurance mobility transfers and ambulation with return to maximize LOF  From PT/mobility standpoint, recommendation at time of d/c would be home no rehab needs  in order to promote return to PLOF and independence  Goals   LTG Expiration Date 05/23/21   Plan   Treatment/Interventions Functional transfer training;LE strengthening/ROM; Elevations; Therapeutic exercise; Endurance training;Bed mobility;Gait training   Progress Progressing toward goals   Recommendation   PT Discharge Recommendation No rehabilitation needs   AM-PAC Basic Mobility Inpatient   Turning in Bed Without Bedrails 4   Lying on Back to Sitting on Edge of Flat Bed 4   Moving Bed to Chair 3   Standing Up From Chair 3   Walk in Room 4   Climb 3-5 Stairs 3   Basic Mobility Inpatient Raw Score 21   Basic Mobility Standardized Score 45 55   Pt  OOB in chair  with call bell within reach, all lines intact at end of PT session  Discussed with  PT today's treatment and patient's current level of function for care coordination    1:1 Supervision in room

## 2021-05-10 NOTE — SPEECH THERAPY NOTE
Speech-Language Pathology Bedside Swallow Evaluation    Patient Name: Fortino Tomas    PWLQS'J Date: 5/10/2021     Problem List  Principal Problem:    Anticholinergic drug overdose  Active Problems:    Depression with anxiety    Hypokalemia    Tobacco use    Toxic encephalopathy    Elevated TSH    Prolonged QT interval    Abdominal pain    Hypernatremia    Suicide attempt ([address] )    Anemia    Urinary retention      Past Medical History  Past Medical History:   Diagnosis Date    Depression     Insomnia     Psychiatric disorder     UTI (urinary tract infection)     Vertigo        Past Surgical History  Past Surgical History:   Procedure Laterality Date    WISDOM TOOTH EXTRACTION         Summary  Pt presented with functional appearing oral and pharyngeal stage swallowing skills with materials administered today  Pt reported she had a sore throat when swallowing earlier but this has since resolved  No further ST f/u needed at this time  Risk/s for Aspiration: low    Recommended Diet: regular diet and thin liquids   Recommended Form of Meds: whole with liquid   Aspiration precautions and swallowing strategies: upright posture      Current Medical Status per H&P 5/8/21  Fortino Tomas is a 23 y o  female who presents to the emergency room for evaluation of  altered mental status probably secondary to Benadryl overdose  Patient has a past medical history depression and psychiatric admission after an intentional Benadryl overdose in November of 2020  She was brought to the emergency room by her boyfriend and her mother  Her boyfriend reported that she texted him earlier stating that she did not have a good day  He states that he attempted to reach her subsequent to that text but  Was unsuccessful  Her mother also reported trying to return was not able to  She was later found at a gas station on [address] in [place]  Upon arrival to the ER patient was noted to be confused, nonverbal tachycardic, tachypneic  Labs completed in emergency room with results as shown below  CT head completed with results as shown below  In emergency room she received magnesium sulfate 2 g IV, potassium chloride 20 mEq p o  normal saline 1 L  In the ER attending discussed case with poison Control  Recommendations given to continue supportive care, IV fluids serial EKGs and monitor QRS  Sodium bicarbonate 1-2 mEq per kg if care is found to being more than 100 mL seconds and benzodiazepines for agitation  Upon arrival to the  ICU  Patient is still Nonverbal, disoriented with a blank stare and dilated pupils  She  Is still tachycardic and tachypneic  Patient has been admitted on inpatient  Step-down level care for further workup and management of acute encephalopathy,  Possible anticholinergic drug overdose, hypokalemia, elevated TSH       Current Precautions: Fall     Special Studies:  CT head 5/8/21 IMPRESSION:  No acute intracranial abnormality  Social/Education/Vocational Hx:  Pt lives with family    Swallow Information   Current Symptoms/Concerns: sore throat  Current Diet: regular diet and thin liquids   Baseline Diet: regular diet and thin liquids      Baseline Assessment   Behavior/Cognition: alert  Speech/Language Status: able to participate in conversation and able to follow commands  Patient Positioning: upright in bed  Pain Status/Interventions/Response to Interventions: No report of or nonverbal indications of pain  Swallow Mechanism Exam  Facial: symmetrical  Labial: WFL  Lingual: WFL  Velum: symmetrical  Mandible: adequate ROM  Dentition: adequate  Vocal quality:clear/adequate   Volitional Cough: strong/productive   Respiratory Status: on RA        Consistencies Assessed and Performance   Consistencies Administered: thin liquids and hard solids    Oral Stage: WFL  Mastication was adequate with the materials administered today  Bolus formation and transfer were functional with no significant oral residue noted   No overt s/s reduced oral control  Pharyngeal Stage: WFL  Swallow Mechanics: Swallowing initiation appeared prompt  Laryngeal rise was palpated and judged to be within functional limits  No coughing, throat clearing, change in vocal quality or respiratory status noted today  Esophageal Concerns: none reported      Summary and Recommendations (see above)    Results Reviewed with: patient and RN     Treatment Recommended: No additional ST f/u needed at this time

## 2021-05-10 NOTE — ASSESSMENT & PLAN NOTE
Secondary to an intentional anticholinergic drug overdose along with benzodiazepine use  Mental status is at baseline, with encephalopathy resolved

## 2021-05-10 NOTE — PLAN OF CARE
Problem: OCCUPATIONAL THERAPY ADULT  Goal: Performs self-care activities at highest level of function for planned discharge setting  See evaluation for individualized goals  Description: Treatment Interventions: ADL retraining, Functional transfer training, UE strengthening/ROM, Endurance training, Patient/family training, Activityengagement          See flowsheet documentation for full assessment, interventions and recommendations  Outcome: Progressing  Note: Limitation: Decreased ADL status, Decreased UE strength, Decreased Safe judgement during ADL, Decreased endurance, Decreased cognition, Decreased self-care trans, Decreased high-level ADLs     Assessment: Patient participated in Skilled OT session this date with interventions consisting of  therapeutic activities to: increase activity tolerance   Patient agreeable to OT treatment session, upon arrival patient was found supine in bed  Pt completed supine to sit txfrs with use of side rail and S, sit to stand txfrs from bed with S, toilet txfr S with RW, toileting S  Pt completed standing balance/functional mobility around room and hallways with RW and S and no LOB throughout with distance limited by fatigue and dizziness  To increase posture, dynamic standing balance, balance during self cares, use of BUE  Pt left in fidel chair with all needs in reach and 1:1 present  Patient requiring verbal cues for safety and verbal cues for correct technique  Patient continues to be functioning below baseline level, occupational performance remains limited secondary to factors listed above and increased risk for falls and injury  From OT standpoint, recommendation at time of d/c would be No rehabilitation needs  Patient to benefit from continued Occupational Therapy treatment while in the hospital to address deficits as defined above and maximize level of functional independence with ADLs and functional mobility  The patient's raw score on the AM-PAC Daily Activity inpatient short form is 22, standardized score is 47 1, greater than 39 4  Patients at this level are likely to benefit from discharge to home  Please refer to the recommendation of the Occupational Therapist for safe discharge planning       OT Discharge Recommendation: No rehabilitation needs

## 2021-05-10 NOTE — PLAN OF CARE
Problem: PHYSICAL THERAPY ADULT  Goal: Performs mobility at highest level of function for planned discharge setting  See evaluation for individualized goals  Description: Treatment/Interventions: ADL retraining, Functional transfer training, LE strengthening/ROM, Elevations, Therapeutic exercise, Endurance training, Bed mobility, Gait training          See flowsheet documentation for full assessment, interventions and recommendations  Outcome: Progressing  Note: Prognosis: Good  Problem List: Decreased strength, Decreased endurance, Impaired balance, Decreased mobility  Assessment: Pt  seen for PT treatment session this date with interventions consisting of bed mobility, transfers and  gait training w/ emphasis on improving pt's ability to ambulate  Pt  Currently performing  tx and ambulation at (SUP ) x 1 level of function  The patient's AM-PAC Basic Mobility Inpatient Short Form Raw Score is 21, Standardized Score is 45 55  A standardized score greater than 42 9 suggests the patient may benefit from discharge to home  Please also refer to physical therapy recommendation for safe DC planning  In comparison to previous session, Pt  With improvements in activity tolerance  Decreased gait speed with no LOB  Pt is in need of continued activity in PT to improve strength balance endurance mobility transfers and ambulation with return to maximize LOF  From PT/mobility standpoint, recommendation at time of d/c would be home no rehab needs  in order to promote return to PLOF and independence  PT Discharge Recommendation: No rehabilitation needs          See flowsheet documentation for full assessment

## 2021-05-10 NOTE — CASE MANAGEMENT
[facility] has a bed for pt tomorrow and requested we arrange transport for after 11am   (Spoke with Lupis in admissions dept)  Lupis also stated they will obtain the precert with [address] [place] [address]

## 2021-05-10 NOTE — ASSESSMENT & PLAN NOTE
With initial TSH elevation in setting of acute illness - free T4 normal X 2  This very likely represents TSH fluctuation and sick euthyroid syndrome in the setting of acute illness and drug overdose  Will discontinue replacement therapy, and recommend repeat TSH in a few weeks when no longer ill

## 2021-05-10 NOTE — CASE MANAGEMENT
Pt willing to go voluntarily to inpt psych (201 signed)  CM working on bed search  No bed in [facility] or at [facility] or [facility]  [facility] beds, CM faxed over clinicals  Waiting to hearing back

## 2021-05-10 NOTE — ASSESSMENT & PLAN NOTE
Secondary to anticholinergic drug overdose  Serna catheter was removed on 05/09/2021  No evidence of further retention

## 2021-05-10 NOTE — UTILIZATION REVIEW
Initial Clinical Review  Observation 5/8/21 @ [id number], converted to inpatient admission 5/10/21 @ 1524 for continued care & tx s/p anticholinergic drug OD  Admission: Date/Time/Statement:   Admission Orders (From admission, onward)     Ordered        05/10/21 1524  Inpatient Admission  Once         05/08/21 0124  Place in Observation  Once                   Orders Placed This Encounter   Procedures   Inpatient Admission    Standing Status:   Standing    Number of Occurrences:   1    Order Specific Question:   Level of Care    Answer:   Med Surg [16]    Order Specific Question:   Estimated length of stay    Answer:   More than 2 Midnights    Order Specific Question:   Certification    Answer:   I certify that inpatient services are medically necessary for this patient for a duration of greater than two midnights  See H&P and MD Progress Notes for additional information about the patient's course of treatment  ED Arrival Information     Expected Arrival Acuity Means of Arrival Escorted By Service Admission Type    - 5/7/2021 22:13 Emergent Wheelchair Friend Hospitalist Emergency    Arrival Complaint    medical problem        Chief Complaint   Patient presents with    Medical Problem     Pt presents with mom and boyfriend who state she "took something" possibly benadryl  Patient will not respond verbally and is staring off into space as if she is seeing something  Initial Presentation:   24 yo female who presents to the ED from home with c/o AMS probably r/t Bendryl OD  In the ED she was confused, nonverbal, tachycardic, tachypneic  PMH: Depression, psych admit post intentional Benadryl OD 11/2020  CT head was WNL  In the ED, GCS = 10, she was treated with IV Magnesium, IV KCl, IV NSS    Acetaminophen levels <-4 5,  Salicylate levels 4 9, toxicology urine negative,  Blood alcohol level <3   Per Poison control continue IV fluids, serial ECG and monitor QRS and Na Bicarb if QRS is > 100 msec , benzodiazepines for agitation  She was admitted to ICU at Level 1 Stepdown in OBSERVATION status with Anticholinergic drug OD - NPO, IV fluids, aspiration and seizure precautions, Tele, close observation, PRN Ativan for agitation, serial ECGs, Toxicology consult  Elevated TSH at 16 33 - check free T4  Acute Encephalopathy - monitor vitals, for seizure activity, fevers  Hypokalemia - 2 7 - replete and trend  Depression with anxiety - Psych consult  Tobacco use - NRT  Pt's therapist is requesting mental health treatment for pt  She remains tachycardic this morning and intermittently hypertensive  She remains stuporous, oriented to person, following command, impulsive  Most recent GCS = 12  Date: 5/9/21  OD of Benadryl and a few Xanax with intention of suicide  She cannot leave AMA  Is agreeable to 201 but will enact 302 if needed  Continue IV fluids  Tolerating only minimal oral intake, pt continees to have prolonged QT interval  She will go to IP psych when her QT interval is normal, she is ambulatory, mentation is at baseline, vitals are normal  Still with hypernatremia today  C/o mild abd pain and decreased appetite  Mental status is improving but she still has some confusion and lethargy  She is having intermittent visual hallucinations  Hypokalemia resolved  OBS to IP 5/10/21  Intentional OD on diphenhydramine and alprazolam; suicide attempt  Remains on 1:1 observation for safety  Mental status appears at baseline  QT interval has normalized  Urinary retention requiring benito cath; cath removed 5/9, retention resolved  Evaluated by behavioral health - accepted in transfer to the [address] [place] [address] psychiatric facility; bed available 5/11  IVF maintained       ED Triage Vitals   Temperature Pulse Respirations Blood Pressure SpO2   05/07/21 2218 05/07/21 2218 05/07/21 2218 05/07/21 2218 05/07/21 2218   98 2 °F (36 8 °C) (!) 146 18 124/59 100 %      Temp Source Heart Rate Source Patient Position - Orthostatic VS BP Location FiO2 (%)   05/07/21 2218 05/07/21 2218 05/07/21 2218 05/07/21 2218 --   Temporal Monitor Lying Left arm       Pain Score       05/08/21 0843       No Pain          Wt Readings from Last 1 Encounters:   05/10/21 69 5 kg (153 lb 3 5 oz)     Additional Vital Signs:   05/10/21 0300  97 5 °F (36 4 °C)  78  15  120/71  91  96 %  None (Room air)  Lying   05/10/21 0200  --  74  13  116/60  83  96 %  None (Room air)  Lying   05/10/21 0100  --  72  14  --  --  97 %  None (Room air)  --   05/10/21 0000  --  78  19  114/61  83  97 %  None (Room air)  Lying   05/09/21 2336  98 °F (36 7 °C)  --  --  --  --  --  --  --   05/09/21 2300  --  78  18  --  --  96 %  None (Room air)  --   05/09/21 2200  --  100  16  125/76  95  97 %  None (Room air)  Lying   05/09/21 2013  --  76  14  123/70  91  --  --  Lying   05/09/21 1913  97 7 °F (36 5 °C)  75  14  122/64  85  98 %  None (Room air)  Lying     Pertinent Labs/Diagnostic Test Results:   Results from last 7 days   Lab Units 05/08/21  0940   SARS-COV-2  Negative     Results from last 7 days   Lab Units 05/10/21  0451 05/09/21  0433 05/08/21  0509 05/07/21  2230   WBC Thousand/uL 5 93 7 65 8 05 8 61   HEMOGLOBIN g/dL 9 3* 9 4* 10 7* 11 4*   HEMATOCRIT % 29 6* 30 2* 33 7* 35 5   PLATELETS Thousands/uL 229 248 306 368   NEUTROS ABS Thousands/µL 2 97 4 56  --  3 61     Results from last 7 days   Lab Units 05/10/21  0451 05/09/21  0433 05/08/21  0509 05/07/21  2230   SODIUM mmol/L 139 146* 140 138   POTASSIUM mmol/L 3 4* 3 6 3 9 2 7*   CHLORIDE mmol/L 105 110* 105 101   CO2 mmol/L 25 26 25 26   ANION GAP mmol/L 9 10 10 11   BUN mg/dL 7 5 6 6   CREATININE mg/dL 0 59* 0 62 0 65 0 65   EGFR ml/min/1 73sq m 130 128 126 126   CALCIUM mg/dL 8 4 8 6 9 3 9 8   CALCIUM, IONIZED mmol/L  --  1 25  --   --    MAGNESIUM mg/dL 2 1 2 2 2 1  --    PHOSPHORUS mg/dL 2 9 3 8 4 4  --      Results from last 7 days   Lab Units 05/10/21  0451 05/09/21  7013 05/08/21  0509 05/07/21  2230   AST U/L 15 20 19 19   ALT U/L 24 22 31 32   ALK PHOS U/L 46 46 48 53   TOTAL PROTEIN g/dL 6 8 6 8 8 2 8 9*   ALBUMIN g/dL 3 8 3 9 4 7 4 9   TOTAL BILIRUBIN mg/dL 0 46 0 40 0 34 0 28     Results from last 7 days   Lab Units 05/10/21  0451 05/09/21  0433 05/08/21  0509 05/07/21  2230   GLUCOSE RANDOM mg/dL 72 86 99 94     Results from last 7 days   Lab Units 05/09/21  0433   CK TOTAL U/L 52     Results from last 7 days   Lab Units 05/09/21  0433 05/07/21  2230   TROPONIN I ng/mL 0 02 <0 02     Results from last 7 days   Lab Units 05/07/21  2230   TSH 3RD GENERATON uIU/mL 16 330*     Results from last 7 days   Lab Units 05/10/21  0451 05/09/21  0433   PROCALCITONIN ng/ml <0 05 <0 05     Results from last 7 days   Lab Units 05/10/21  0451   LACTIC ACID mmol/L 0 6     Results from last 7 days   Lab Units 05/10/21  0451   FERRITIN ng/mL 10     Results from last 7 days   Lab Units 05/09/21  0433   HEP B S AG  Non-reactive   HEP C AB  Non-reactive   HEP B C IGM  Non-reactive     Results from last 7 days   Lab Units 05/07/21  2306   AMPH/METH  Negative   BARBITURATE UR  Negative   BENZODIAZEPINE UR  Negative   COCAINE UR  Negative   METHADONE URINE  Negative   OPIATE UR  Negative   PCP UR  Negative   THC UR  Negative     Results from last 7 days   Lab Units 05/07/21  2230   ETHANOL LVL mg/dL <3   ACETAMINOPHEN LVL ug/mL <4 6*   SALICYLATE LVL mg/dL 4 9     5/8  CT head=  No acute intracranial abnormality  Ekg=  Vent   rate 110 BPM CA interval 152 ms QRS duration 88 ms QT/QTc 378/511 ms, no terminal R, no GENARO or STD    ED Treatment:   Medication Administration from 05/07/2021 2213 to 05/08/2021 0233       Date/Time Order Dose Route Action     05/07/2021 2229 sodium chloride 0 9 % bolus 1,000 mL 1,000 mL Intravenous New Bag     05/07/2021 2317 magnesium sulfate 2 g/50 mL IVPB (premix) 2 g 2 g Intravenous New Bag     05/08/2021 0019 potassium chloride 20 mEq IVPB (premix) 20 mEq Intravenous New Bag     05/08/2021 0228 sodium chloride 0 9 % infusion 125 mL/hr Intravenous New Bag        Past Medical History:   Diagnosis Date    Depression     Insomnia     Psychiatric disorder     UTI (urinary tract infection)     Vertigo      Present on Admission:   Tobacco use   Depression with anxiety    Admitting Diagnosis: Sinus tachycardia [R00 0]  Hypokalemia [E87 6]  Altered mental status [G32 12]  Toxic metabolic encephalopathy [L06]  Anticholinergic drug overdose, undetermined intent, initial encounter [T44 3X4A]  Age/Sex: 23 y o  female  Admission Orders:  1:1 observation  Pt/ot/st eval & tx  Aspiration precautions  Seizure precautions  Contact & airborne isolation  scd  Serna cath  Suicide precautions  Consult toxicology    Scheduled Medications:  enoxaparin, 40 mg, Subcutaneous, Q24H  LORazepam (ATIVAN) injection 0 5mg x1 dose, 1mg x 3 doses (5/8)  magnesium sulfate 2 g/50 mL IVPB 2 g x 1 dose, 5/8  nicotine, 1 patch, Transdermal, Daily  potassium chloride 20 mEq IVPB x 1 dose 5/8    Continuous IV Infusions:  multi-electrolyte (PLASMALYTE-A/ISOLYTE-S PH 7 4) IV solution   Rate: 100 mL/hr Dose: 100 mL/hr  Freq: Continuous Route: IV  Indications of Use: IV Hydration  Last Dose: 100 mL/hr (05/10/21 1245)  Start: 05/09/21 0800    sodium chloride 0 9 % infusion   Rate: 125 mL/hr Dose: 125 mL/hr  Freq: Continuous Route: IV  Indications of Use: IV Hydration  Last Dose: Stopped (05/09/21 0828)  Start: 05/08/21 0230 End: 05/09/21 0749      PRN Meds:  acetaminophen, 650 mg, Oral, Q6H PRN  aluminum-magnesium hydroxide-simethicone, 30 mL, Oral, Q4H PRN  Labetalol HCl, 10 mg, Intravenous, Q4H PRN  LORazepam, 0 5 mg, Intravenous, Q4H PRN-used x1, 5/8 & x1, 5/9  ondansetron (ZOFRAN) injection 4 mg Q6H PRN-used x 1, 5/8  polyethylene glycol, 17 g, Oral, Daily PRN    Network Utilization Review Department  ATTENTION: Please call with any questions or concerns to [phone] and carefully listen to the prompts so that you are directed to the right person  All voicemails are confidential   Harlene Pair all requests for admission clinical reviews, approved or denied determinations and any other requests to dedicated fax number below belonging to the campus where the patient is receiving treatment   List of dedicated fax numbers for the Facilities:  [address] DENIALS (Administrative/Medical Necessity) [phone]   [address] (Maternity/NICU/Pediatrics) [phone] [address] [address] [place] [address] [address] [place] [address] [phone]

## 2021-05-10 NOTE — UTILIZATION REVIEW
Continued Stay Review    Date: 5/9/21                          Current Patient Class: OBS  Current Level of Care: Level 1 SD    HPI:25 y o  female initially admitted on 5/8 with AMS probably r/t Bendryl OD    Assessment/Plan:   OD of Benadryl and a few Xanax with intention of suicide  She cannot leave AMA  Is agreeable to 201 but will enact 302 if needed  Continue IV fluids  Tolerating only minimal oral intake, pt continees to have prolonged QT interval  She will go to IP psych when her QT interval is normal, she is ambulatory, mentation is at baseline, vitals are normal  Still with hypernatremia today  C/o mild abd pain and decreased appetite  Mental status is improving but she still has some confusion and lethargy  She is having intermittent visual hallucinations  Hypokalemia resolved       Vital Signs:   05/09/21 2013  --  76  14  123/70  91  --  --  Lying   05/09/21 1913  97 7 °F (36 5 °C)  75  14  122/64  85  98 %  None (Room air)  Lying   05/09/21 1500  98 7 °F (37 1 °C)  --  --  --  --  --  --  --   05/09/21 1010  --  81  14  123/73  93  97 %  --  --   05/09/21 0910  --  88  13  121/71  91  100 %  --  --   05/09/21 0810  --  97  20  133/76  99  95 %  --  --   05/09/21 0710  --  68  13  115/70  85  98 %  --  --   05/09/21 0610  --  67  12  108/71  85  97 %  None (Room air)  Lying   05/09/21 0510  --  71  12  106/66  80  97 %  None (Room air)  Lying   05/09/21 0410  --  72  12  110/59  79  97 %  None (Room air)  Lying   05/09/21 0400  --  71  12  --  --  96 %  None (Room air)  --   05/09/21 0312  97 9 °F (36 6 °C)  71  12  109/67  83  96 %  None (Room air)  Lying   05/09/21 0210  --  73  12  116/69  87  97 %  None (Room air)  Lying   05/09/21 0110  --  74  13  112/72  87  96 %  None (Room air)  Lying   05/09/21 0010  --  75  13  107/67  81  96 %  None (Room air)  Lying   05/08/21 2330  --  84  33Abnormal   --  --  98 %  None (Room air)  --   05/08/21 2314  98 5 °F (36 9 °C)  91  23Abnormal   136/69  95  97 %  None (Room air)  Lying   05/08/21 2210  --  95  20  152/90  109  98 %  None (Room air)  Lying   05/08/21 2110  --  91  26Abnormal   137/85  109  98 %  None (Room air)  Lying   05/08/21 2011  --  89  20  128/82  101  99 %  None (Room air)  Lying   05/08/21 1910  98 2 °F (36 8 °C)  90  23Abnormal   142/83  --  98 %  None (Room air)  Lying   05/08/21 1710  --  85  17  146/72  98  100 %  None (Room air)  Lying   05/08/21 1610  --  86  18  153/97  117  100 %  --  --   05/08/21 1457  97 2 °F (36 2 °C)Abnormal   101  37Abnormal   156/101Abnormal   --  98 %  None (Room air)  Lying   05/08/21 1300  --  111Abnormal   --  152/91  116  100 %  --  --   05/08/21 1200  --  102  27Abnormal   136/61  88  100 %  --  --   05/08/21 1100  --  110Abnormal   22  164/77  110  98 %  --  --   05/08/21 1000  --  97  20  156/94  120  100 %  --  --   05/08/21 0900  --  113Abnormal   30Abnormal   140/89  108  100 %  --  --   05/08/21 0800  --  97  20  176/117Abnormal   142  100 %  --  --   05/08/21 0704  97 1 °F (36 2 °C)Abnormal   99  13  154/96  121  100 %  --  Lying   05/08/21 0500  --  99  21  140/92  112  100 %  --  --   05/08/21 0400  --  108Abnormal   21  153/92  115  100 %  --  --   05/08/21 0300  --  109Abnormal   20  154/101Abnormal   122  100 %  --  --   05/08/21 0107  98 1 °F (36 7 °C)  116Abnormal   22  159/103Abnormal   126  98 %  --  --   05/08/21 0017  --  123Abnormal   18  144/91  --  98 %  None (Room air)  Lying   05/07/21 2218  98 2 °F (36 8 °C)  146Abnormal   18  124/59  --  100 %  None (Room air)  Lying     Pertinent Labs/Diagnostic Results:   Results from last 7 days   Lab Units 05/08/21  0940   SARS-COV-2  Negative     Results from last 7 days   Lab Units 05/09/21  0433 05/08/21  0509 05/07/21  2230   WBC Thousand/uL 7 65 8 05 8 61   HEMOGLOBIN g/dL 9 4* 10 7* 11 4*   HEMATOCRIT % 30 2* 33 7* 35 5   PLATELETS Thousands/uL 248 306 368   NEUTROS ABS Thousands/µL 4 56  --  3 61         Results from last 7 days   Lab Units 05/09/21  0433 05/08/21  0509 05/07/21  2230   SODIUM mmol/L 146* 140 138   POTASSIUM mmol/L 3 6 3 9 2 7*   CHLORIDE mmol/L 110* 105 101   CO2 mmol/L 26 25 26   ANION GAP mmol/L 10 10 11   BUN mg/dL 5 6 6   CREATININE mg/dL 0 62 0 65 0 65   EGFR ml/min/1 73sq m 128 126 126   CALCIUM mg/dL 8 6 9 3 9 8   CALCIUM, IONIZED mmol/L 1 25  --   --    MAGNESIUM mg/dL 2 2 2 1  --    PHOSPHORUS mg/dL 3 8 4 4  --      Results from last 7 days   Lab Units 05/09/21  0433 05/08/21  0509 05/07/21  2230   AST U/L 20 19 19   ALT U/L 22 31 32   ALK PHOS U/L 46 48 53   TOTAL PROTEIN g/dL 6 8 8 2 8 9*   ALBUMIN g/dL 3 9 4 7 4 9   TOTAL BILIRUBIN mg/dL 0 40 0 34 0 28         Results from last 7 days   Lab Units 05/09/21  0433 05/08/21  0509 05/07/21  2230   GLUCOSE RANDOM mg/dL 86 99 94     Results from last 7 days   Lab Units 05/09/21  0433   CK TOTAL U/L 52     Results from last 7 days   Lab Units 05/09/21  0433 05/07/21  2230   TROPONIN I ng/mL 0 02 <0 02     Results from last 7 days   Lab Units 05/07/21  2230   TSH 3RD GENERATON uIU/mL 16 330*     Results from last 7 days   Lab Units 05/09/21  0433   PROCALCITONIN ng/ml <0 05     Results from last 7 days   Lab Units 05/09/21  0433   HEP B S AG  Non-reactive   HEP C AB  Non-reactive   HEP B C IGM  Non-reactive     Results from last 7 days   Lab Units 05/07/21  2306   AMPH/METH  Negative   BARBITURATE UR  Negative   BENZODIAZEPINE UR  Negative   COCAINE UR  Negative   METHADONE URINE  Negative   OPIATE UR  Negative   PCP UR  Negative   THC UR  Negative     Results from last 7 days   Lab Units 05/07/21  2230   ETHANOL LVL mg/dL <3   ACETAMINOPHEN LVL ug/mL <9 9*   SALICYLATE LVL mg/dL 4 9     Medications:   Scheduled Medications:  enoxaparin, 40 mg, Subcutaneous, Q24H  [START ON 5/10/2021] levothyroxine, 50 mcg, Oral, Early Morning  nicotine, 1 patch, Transdermal, Daily      Continuous IV Infusions:  multi-electrolyte, 100 mL/hr, Intravenous, Continuous      PRN Meds:  acetaminophen, 650 mg, Oral, Q6H PRN - x 1 5/9  aluminum-magnesium hydroxide-simethicone, 30 mL, Oral, Q4H PRN - x 1 5/9  Labetalol HCl, 10 mg, Intravenous, Q4H PRN  LORazepam, 0 5 mg, Intravenous, Q4H PRN - x 1 5/8  Zofran 4 mg IV q 6 hr PRN - x 1 5/8    Discharge Plan: IP psych placement with 201 or 302 if needed  Network Utilization Review Department  ATTENTION: Please call with any questions or concerns to [phone] and carefully listen to the prompts so that you are directed to the right person  All voicemails are confidential   Pietro Hobbs all requests for admission clinical reviews, approved or denied determinations and any other requests to dedicated fax number below belonging to the campus where the patient is receiving treatment   List of dedicated fax numbers for the Facilities:  [address] DENIALS (Administrative/Medical Necessity) [phone]   [address] (Maternity/NICU/Pediatrics) [phone]   [address] [place] [address] [address] [address] [place] [address] [address] [facility] [place] [address] [phone]

## 2021-05-11 VITALS
OXYGEN SATURATION: 99 % | WEIGHT: 155.2 LBS | RESPIRATION RATE: 16 BRPM | DIASTOLIC BLOOD PRESSURE: 74 MMHG | TEMPERATURE: 98.6 F | SYSTOLIC BLOOD PRESSURE: 120 MMHG | HEIGHT: 63 IN | BODY MASS INDEX: 27.5 KG/M2 | HEART RATE: 73 BPM

## 2021-05-11 LAB
ANION GAP SERPL CALCULATED.3IONS-SCNC: 10 MMOL/L (ref 4–13)
ATRIAL RATE: 78 BPM
BASOPHILS # BLD AUTO: 0.02 THOUSANDS/ΜL (ref 0–0.1)
BASOPHILS NFR BLD AUTO: 0 % (ref 0–1)
BUN SERPL-MCNC: 5 MG/DL (ref 5–25)
CALCIUM SERPL-MCNC: 8.5 MG/DL (ref 8.3–10.1)
CHLORIDE SERPL-SCNC: 107 MMOL/L (ref 100–108)
CO2 SERPL-SCNC: 25 MMOL/L (ref 21–32)
CREAT SERPL-MCNC: 0.49 MG/DL (ref 0.6–1.3)
EOSINOPHIL # BLD AUTO: 0.16 THOUSAND/ΜL (ref 0–0.61)
EOSINOPHIL NFR BLD AUTO: 3 % (ref 0–6)
ERYTHROCYTE [DISTWIDTH] IN BLOOD BY AUTOMATED COUNT: 14.6 % (ref 11.6–15.1)
GFR SERPL CREATININE-BSD FRML MDRD: 138 ML/MIN/1.73SQ M
GLUCOSE SERPL-MCNC: 84 MG/DL (ref 65–140)
HCT VFR BLD AUTO: 28.2 % (ref 34.8–46.1)
HGB BLD-MCNC: 8.9 G/DL (ref 11.5–15.4)
IMM GRANULOCYTES # BLD AUTO: 0.01 THOUSAND/UL (ref 0–0.2)
IMM GRANULOCYTES NFR BLD AUTO: 0 % (ref 0–2)
LYMPHOCYTES # BLD AUTO: 2.25 THOUSANDS/ΜL (ref 0.6–4.47)
LYMPHOCYTES NFR BLD AUTO: 38 % (ref 14–44)
MAGNESIUM SERPL-MCNC: 2 MG/DL (ref 1.6–2.6)
MCH RBC QN AUTO: 26.3 PG (ref 26.8–34.3)
MCHC RBC AUTO-ENTMCNC: 31.6 G/DL (ref 31.4–37.4)
MCV RBC AUTO: 83 FL (ref 82–98)
MONOCYTES # BLD AUTO: 0.48 THOUSAND/ΜL (ref 0.17–1.22)
MONOCYTES NFR BLD AUTO: 8 % (ref 4–12)
NEUTROPHILS # BLD AUTO: 2.97 THOUSANDS/ΜL (ref 1.85–7.62)
NEUTS SEG NFR BLD AUTO: 51 % (ref 43–75)
NRBC BLD AUTO-RTO: 0 /100 WBCS
P AXIS: 63 DEGREES
PLATELET # BLD AUTO: 223 THOUSANDS/UL (ref 149–390)
PMV BLD AUTO: 10.4 FL (ref 8.9–12.7)
POTASSIUM SERPL-SCNC: 3.6 MMOL/L (ref 3.5–5.3)
PR INTERVAL: 134 MS
QRS AXIS: 58 DEGREES
QRSD INTERVAL: 86 MS
QT INTERVAL: 414 MS
QTC INTERVAL: 471 MS
RBC # BLD AUTO: 3.39 MILLION/UL (ref 3.81–5.12)
SODIUM SERPL-SCNC: 142 MMOL/L (ref 136–145)
T WAVE AXIS: 63 DEGREES
VENTRICULAR RATE: 78 BPM
WBC # BLD AUTO: 5.89 THOUSAND/UL (ref 4.31–10.16)

## 2021-05-11 PROCEDURE — 85025 COMPLETE CBC W/AUTO DIFF WBC: CPT | Performed by: INTERNAL MEDICINE

## 2021-05-11 PROCEDURE — 99239 HOSP IP/OBS DSCHRG MGMT >30: CPT | Performed by: INTERNAL MEDICINE

## 2021-05-11 PROCEDURE — 80048 BASIC METABOLIC PNL TOTAL CA: CPT | Performed by: INTERNAL MEDICINE

## 2021-05-11 PROCEDURE — 97116 GAIT TRAINING THERAPY: CPT

## 2021-05-11 PROCEDURE — 83735 ASSAY OF MAGNESIUM: CPT | Performed by: INTERNAL MEDICINE

## 2021-05-11 PROCEDURE — 93010 ELECTROCARDIOGRAM REPORT: CPT | Performed by: INTERNAL MEDICINE

## 2021-05-11 RX ORDER — POTASSIUM CHLORIDE 20 MEQ/1
40 TABLET, EXTENDED RELEASE ORAL ONCE
Status: COMPLETED | OUTPATIENT
Start: 2021-05-11 | End: 2021-05-11

## 2021-05-11 RX ORDER — POLYETHYLENE GLYCOL 3350 17 G/17G
17 POWDER, FOR SOLUTION ORAL DAILY PRN
Refills: 0
Start: 2021-05-11 | End: 2022-05-03

## 2021-05-11 RX ADMIN — POLYETHYLENE GLYCOL 3350 17 G: 17 POWDER, FOR SOLUTION ORAL at 08:57

## 2021-05-11 RX ADMIN — ACETAMINOPHEN 650 MG: 325 TABLET, FILM COATED ORAL at 08:57

## 2021-05-11 RX ADMIN — SODIUM CHLORIDE, SODIUM GLUCONATE, SODIUM ACETATE, POTASSIUM CHLORIDE, MAGNESIUM CHLORIDE, SODIUM PHOSPHATE, DIBASIC, AND POTASSIUM PHOSPHATE 100 ML/HR: .53; .5; .37; .037; .03; .012; .00082 INJECTION, SOLUTION INTRAVENOUS at 08:38

## 2021-05-11 RX ADMIN — POTASSIUM CHLORIDE 40 MEQ: 1500 TABLET, EXTENDED RELEASE ORAL at 08:36

## 2021-05-11 RX ADMIN — NICOTINE 7 MG/24 HR DAILY TRANSDERMAL PATCH 1 PATCH: at 08:36

## 2021-05-11 NOTE — DISCHARGE SUMMARY
[address] [facility] [address]  Discharge- Sindy Orr 1995, 23 y o  female MRN: [id number]  Unit/Bed#: 407-01 Encounter: [id number]  Primary Care Provider: Cameron Miller PA-C   Date and time admitted to hospital: 5/7/2021 10:14 PM    * Anticholinergic drug overdose  Assessment & Plan  Intentional overdose on Diphenhydramine and Alprazolam   This represented a suicide attempt  · Patient is currently a 12 - will require a 302 enacted if she decides to leave  She is not able to sign out AMA  · The patient was seen in consultation by Toxicology  · The patient did not receive physostigmine in the ED  · Was maintained on IV fluids  · Also maintained on 1:1 observation and suicide precautions  · Evaluated by behavioral health - accepted in transfer to the [address] [place] [address] psychiatric facility  · Per Toxicology recommendations, the patient is now considered medically cleared for transfer to psych unit:  · Prolonged QT interval has normalized  · Mental status remains at baseline  · Vital signs remain stable  · Patient is ambulatory  Toxic encephalopathy  Assessment & Plan  Secondary to an intentional anticholinergic drug overdose along with benzodiazepine use  Mental status is at baseline, with encephalopathy resolved  Elevated TSH  Assessment & Plan  With initial TSH elevation in setting of acute illness - free T4 normal X 2  This very likely represents TSH fluctuation and sick euthyroid syndrome in the setting of acute illness and drug overdose  Recommendations for repeat TSH in a few weeks when no longer ill  Prolonged QT interval  Assessment & Plan  Received magnesium sulfate 2 grams IV x 1 dose on 05/08/2021  QT interval has normalized  Depression with anxiety  Assessment & Plan  Noted  For inpatient psychiatric hospitalization and treatment as above  Anemia  Assessment & Plan  Confirmed iron deficiency in young menstruating female      Urinary retention  Assessment & Plan  Secondary to anticholinergic drug overdose  Serna catheter was removed on 05/09/2021  No evidence of further retention  Tobacco use  Assessment & Plan  Nicotine patch remains in place  Discharging Physician / Practitioner: Amol Sanon MD  PCP: Dc Degroot PA-C  Admission Date:   Admission Orders (From admission, onward)     Ordered        05/10/21 1524  Inpatient Admission  Once         05/08/21 0124  Place in Observation  Once                   Discharge Date: 05/11/21    Resolved Problems  Date Reviewed: 5/11/2021    None          Consultations During Hospital Stay:  · Medical toxicology  · Behavioral Health    Procedures Performed:   · None    Significant Findings / Test Results:   Ct Head Without Contrast    Result Date: 5/8/2021  Impression: No acute intracranial abnormality  Workstation performed: [id number]     Incidental Findings:   · None     Test Results Pending at Discharge (will require follow up): · None     Outpatient Tests Requested:  · None    Complications:  None    Reason for Admission:  Intentional drug overdose  HPI from original H&P:     Aaliyah Maza is a 23 y o  female who presents to the emergency room for evaluation of  altered mental status probably secondary to Benadryl overdose  Patient has a past medical history depression and psychiatric admission after an intentional Benadryl overdose in November of 2020  She was brought to the emergency room by her boyfriend and her mother  Her boyfriend reported that she texted him earlier stating that she did not have a good day  He states that he attempted to reach her subsequent to that text but  Was unsuccessful  Her mother also reported trying to return was not able to  She was later found at a [place] station [place] [address] in [place]  Upon arrival to the ER patient was noted to be confused, nonverbal tachycardic, tachypneic  Labs completed in emergency room with results as shown below    CT head completed with results as shown below  In emergency room she received magnesium sulfate 2 g IV, potassium chloride 20 mEq p o  normal saline 1 L  In the ER attending discussed case with poison Control  Recommendations given to continue supportive care, IV fluids serial EKGs and monitor QRS  Sodium bicarbonate 1-2 mEq per kg if care is found to being more than 100 mL seconds and benzodiazepines for agitation  Upon arrival to the  ICU  Patient is still  Nonverbal, disoriented with a blank stare and dilated pupils  She  Is still tachycardic and tachypneic  Patient has been admitted on inpatient  Step-down level care for further workup and management of acute encephalopathy,  Possible anticholinergic drug overdose, hypokalemia, elevated TSH    Please see above list of diagnoses and related plan for additional information  Condition at Discharge: stable     Discharge Day Visit / Exam:     * Please refer to separate progress note for these details *    Discharge instructions/Information to patient and family:   See after visit summary for information provided to patient and family  Provisions for Follow-Up Care:  See after visit summary for information related to follow-up care and any pertinent home health orders  Disposition:     Inpatient Psychiatry at [facility]  For Discharges to [facility] SNF:   · Not Applicable to this Patient - Not Applicable to this Patient    Planned Readmission: No     Discharge Statement:  I spent 40 minutes discharging the patient  This time was spent on the day of discharge  I had direct contact with the patient on the day of discharge  Greater than 50% of the total time was spent examining patient, answering all patient questions, arranging and discussing plan of care with patient as well as directly providing post-discharge instructions  Additional time then spent on discharge activities      Discharge Medications:  See after visit summary for reconciled discharge medications provided to patient and family        ** Please Note: This note has been constructed using a voice recognition system **

## 2021-05-11 NOTE — NURSING NOTE
AVS printed and gone over patient  Patient voiced understanding  Patient left floor via stretcher accompanied by transport team in stable condition  Patient being transferred to Behavioral health in [place]

## 2021-05-11 NOTE — PLAN OF CARE
Problem: PHYSICAL THERAPY ADULT  Goal: Performs mobility at highest level of function for planned discharge setting  See evaluation for individualized goals  Description: Treatment/Interventions: ADL retraining, Functional transfer training, LE strengthening/ROM, Elevations, Therapeutic exercise, Endurance training, Bed mobility, Gait training          See flowsheet documentation for full assessment, interventions and recommendations  Outcome: Progressing  Note: Prognosis: Good  Problem List: Decreased mobility  Assessment: Pt  seen for physical therapy to improve mobility  Pt  Mikey Gilberto increased distance very well with improving endurance and no episodes LOB  The patient's AM-PAC Basic Mobility Inpatient Short Form Raw Score is 23, Standardized Score is 50  88  A standardized score greater than 42 9 suggests the patient may benefit from discharge to home  Please also refer to the recommendation of the Physical Therapist for safe discharge planning  Recommend no rehabilitation needs  PT Discharge Recommendation: No rehabilitation needs          See flowsheet documentation for full assessment

## 2021-05-11 NOTE — ASSESSMENT & PLAN NOTE
Intentional overdose on Diphenhydramine and Alprazolam   This represented a suicide attempt  · Patient is currently a 12 - will require a 302 enacted if she decides to leave  She is not able to sign out AMA  · The patient was seen in consultation by Toxicology  · The patient did not receive physostigmine in the ED  · Was maintained on IV fluids  · Also maintained on 1:1 observation and suicide precautions  · Evaluated by behavioral health - accepted in transfer to the [address] [place] [address] psychiatric facility  · Per Toxicology recommendations, the patient is now considered medically cleared for transfer to psych unit:  · Prolonged QT interval has normalized  · Mental status remains at baseline  · Vital signs remain stable  · Patient is ambulatory

## 2021-05-11 NOTE — CASE MANAGEMENT
Spoke with Sergio Barber at [facility] who stated they only take residents from [place], [facility] and [facility] (Pt is from [place])  Pt's mom aware   Pt to go to [facility] today and is set for a 12:45pick up

## 2021-05-11 NOTE — PLAN OF CARE
Problem: Prexisting or High Potential for Compromised Skin Integrity  Goal: Skin integrity is maintained or improved  Description: INTERVENTIONS:  - Identify patients at risk for skin breakdown  - Assess and monitor skin integrity  - Assess and monitor nutrition and hydration status  - Monitor labs   - Assess for incontinence   - Turn and reposition patient  - Assist with mobility/ambulation  - Relieve pressure over bony prominences  - Avoid friction and shearing  - Provide appropriate hygiene as needed including keeping skin clean and dry  - Evaluate need for skin moisturizer/barrier cream  - Collaborate with interdisciplinary team   - Patient/family teaching  - Consider wound care consult   Outcome: Progressing     Problem: Potential for Falls  Goal: Patient will remain free of falls  Description: INTERVENTIONS:  - Assess patient frequently for physical needs  -  Identify cognitive and physical deficits and behaviors that affect risk of falls    -  [place] fall precautions as indicated by assessment   - Educate patient/family on patient safety including physical limitations  - Instruct patient to call for assistance with activity based on assessment  - Modify environment to reduce risk of injury  - Consider OT/PT consult to assist with strengthening/mobility  Outcome: Progressing     Problem: PAIN - ADULT  Goal: Verbalizes/displays adequate comfort level or baseline comfort level  Description: Interventions:  - Encourage patient to monitor pain and request assistance  - Assess pain using appropriate pain scale  - Administer analgesics based on type and severity of pain and evaluate response  - Implement non-pharmacological measures as appropriate and evaluate response  - Consider cultural and social influences on pain and pain management  - Notify physician/advanced practitioner if interventions unsuccessful or patient reports new pain  Outcome: Progressing     Problem: INFECTION - ADULT  Goal: Absence or prevention of progression during hospitalization  Description: INTERVENTIONS:  - Assess and monitor for signs and symptoms of infection  - Monitor lab/diagnostic results  - Monitor all insertion sites, i e  indwelling lines, tubes, and drains  - Monitor endotracheal if appropriate and nasal secretions for changes in amount and color  - [place] appropriate cooling/warming therapies per order  - Administer medications as ordered  - Instruct and encourage patient and family to use good hand hygiene technique  - Identify and instruct in appropriate isolation precautions for identified infection/condition  Outcome: Progressing  Goal: Absence of fever/infection during neutropenic period  Description: INTERVENTIONS:  - Monitor WBC    Outcome: Progressing     Problem: SAFETY ADULT  Goal: Patient will remain free of falls  Description: INTERVENTIONS:  - Assess patient frequently for physical needs  -  Identify cognitive and physical deficits and behaviors that affect risk of falls    -  [place] fall precautions as indicated by assessment   - Educate patient/family on patient safety including physical limitations  - Instruct patient to call for assistance with activity based on assessment  - Modify environment to reduce risk of injury  - Consider OT/PT consult to assist with strengthening/mobility  Outcome: Progressing  Goal: Maintain or return to baseline ADL function  Description: INTERVENTIONS:  -  Assess patient's ability to carry out ADLs; assess patient's baseline for ADL function and identify physical deficits which impact ability to perform ADLs (bathing, care of mouth/teeth, toileting, grooming, dressing, etc )  - Assess/evaluate cause of self-care deficits   - Assess range of motion  - Assess patient's mobility; develop plan if impaired  - Assess patient's need for assistive devices and provide as appropriate  - Encourage maximum independence but intervene and supervise when necessary  - Involve family in performance of ADLs  - Assess for home care needs following discharge   - Consider OT consult to assist with ADL evaluation and planning for discharge  - Provide patient education as appropriate  Outcome: Progressing  Goal: Maintain or return mobility status to optimal level  Description: INTERVENTIONS:  - Assess patient's baseline mobility status (ambulation, transfers, stairs, etc )    - Identify cognitive and physical deficits and behaviors that affect mobility  - Identify mobility aids required to assist with transfers and/or ambulation (gait belt, sit-to-stand, lift, walker, cane, etc )  - [place] fall precautions as indicated by assessment  - Record patient progress and toleration of activity level on Mobility SBAR; progress patient to next Phase/Stage  - Instruct patient to call for assistance with activity based on assessment  - Consider rehabilitation consult to assist with strengthening/weightbearing, etc   Outcome: Progressing     Problem: DISCHARGE PLANNING  Goal: Discharge to home or other facility with appropriate resources  Description: INTERVENTIONS:  - Identify barriers to discharge w/patient and caregiver  - Arrange for needed discharge resources and transportation as appropriate  - Identify discharge learning needs (meds, wound care, etc )  - Arrange for interpretive services to assist at discharge as needed  - Refer to Case Management Department for coordinating discharge planning if the patient needs post-hospital services based on physician/advanced practitioner order or complex needs related to functional status, cognitive ability, or social support system  Outcome: Progressing     Problem: Knowledge Deficit  Goal: Patient/family/caregiver demonstrates understanding of disease process, treatment plan, medications, and discharge instructions  Description: Complete learning assessment and assess knowledge base    Interventions:  - Provide teaching at level of understanding  - Provide teaching via preferred learning methods  Outcome: Progressing     Problem: NEUROSENSORY - ADULT  Goal: Achieves stable or improved neurological status  Description: INTERVENTIONS  - Monitor and report changes in neurological status  - Monitor vital signs such as temperature, blood pressure, glucose, and any other labs ordered   - Initiate measures to prevent increased intracranial pressure  - Monitor for seizure activity and implement precautions if appropriate      Outcome: Progressing  Goal: Achieves maximal functionality and self care  Description: INTERVENTIONS  - Monitor swallowing and airway patency with patient fatigue and changes in neurological status  - Encourage and assist patient to increase activity and self care     - Encourage visually impaired, hearing impaired and aphasic patients to use assistive/communication devices  Outcome: Progressing     Problem: CARDIOVASCULAR - ADULT  Goal: Maintains optimal cardiac output and hemodynamic stability  Description: INTERVENTIONS:  - Monitor I/O, vital signs and rhythm  - Monitor for S/S and trends of decreased cardiac output  - Administer and titrate ordered vasoactive medications to optimize hemodynamic stability  - Assess quality of pulses, skin color and temperature  - Assess for signs of decreased coronary artery perfusion  - Instruct patient to report change in severity of symptoms  Outcome: Progressing  Goal: Absence of cardiac dysrhythmias or at baseline rhythm  Description: INTERVENTIONS:  - Continuous cardiac monitoring, vital signs, obtain 12 lead EKG if ordered  - Administer antiarrhythmic and heart rate control medications as ordered  - Monitor electrolytes and administer replacement therapy as ordered  Outcome: Progressing     Problem: RESPIRATORY - ADULT  Goal: Achieves optimal ventilation and oxygenation  Description: INTERVENTIONS:  - Assess for changes in respiratory status  - Assess for changes in mentation and behavior  - Position to facilitate oxygenation and minimize respiratory effort  - Oxygen administered by appropriate delivery if ordered  - Initiate smoking cessation education as indicated  - Encourage broncho-pulmonary hygiene including cough, deep breathe, Incentive Spirometry  - Assess the need for suctioning and aspirate as needed  - Assess and instruct to report SOB or any respiratory difficulty  - Respiratory Therapy support as indicated  Outcome: Progressing     Problem: METABOLIC, FLUID AND ELECTROLYTES - ADULT  Goal: Electrolytes maintained within normal limits  Description: INTERVENTIONS:  - Monitor labs and assess patient for signs and symptoms of electrolyte imbalances  - Administer electrolyte replacement as ordered  - Monitor response to electrolyte replacements, including repeat lab results as appropriate  - Instruct patient on fluid and nutrition as appropriate  Outcome: Progressing  Goal: Fluid balance maintained  Description: INTERVENTIONS:  - Monitor labs   - Monitor I/O and WT  - Instruct patient on fluid and nutrition as appropriate  - Assess for signs & symptoms of volume excess or deficit  Outcome: Progressing  Goal: Glucose maintained within target range  Description: INTERVENTIONS:  - Monitor Blood Glucose as ordered  - Assess for signs and symptoms of hyperglycemia and hypoglycemia  - Administer ordered medications to maintain glucose within target range  - Assess nutritional intake and initiate nutrition service referral as needed  Outcome: Progressing     Problem: SKIN/TISSUE INTEGRITY - ADULT  Goal: Skin integrity remains intact  Description: INTERVENTIONS  - Identify patients at risk for skin breakdown  - Assess and monitor skin integrity  - Assess and monitor nutrition and hydration status  - Monitor labs (i e  albumin)  - Assess for incontinence   - Turn and reposition patient  - Assist with mobility/ambulation  - Relieve pressure over bony prominences  - Avoid friction and shearing  - Provide appropriate hygiene as needed including keeping skin clean and dry  - Evaluate need for skin moisturizer/barrier cream  - Collaborate with interdisciplinary team (i e  Nutrition, Rehabilitation, etc )   - Patient/family teaching  Outcome: Progressing  Goal: Incision(s), wounds(s) or drain site(s) healing without S/S of infection  Description: INTERVENTIONS  - Assess and document risk factors for skin impairment   - Assess and document dressing, incision, wound bed, drain sites and surrounding tissue  - Consider nutrition services referral as needed  - Oral mucous membranes remain intact  - Provide patient/ family education  Outcome: Progressing  Goal: Oral mucous membranes remain intact  Description: INTERVENTIONS  - Assess oral mucosa and hygiene practices  - Implement preventative oral hygiene regimen  - Implement oral medicated treatments as ordered  - Initiate Nutrition services referral as needed  Outcome: Progressing     Problem: HEMATOLOGIC - ADULT  Goal: Maintains hematologic stability  Description: INTERVENTIONS  - Assess for signs and symptoms of bleeding or hemorrhage  - Monitor labs  - Administer supportive blood products/factors as ordered and appropriate  Outcome: Progressing     Problem: MUSCULOSKELETAL - ADULT  Goal: Maintain or return mobility to safest level of function  Description: INTERVENTIONS:  - Assess patient's ability to carry out ADLs; assess patient's baseline for ADL function and identify physical deficits which impact ability to perform ADLs (bathing, care of mouth/teeth, toileting, grooming, dressing, etc )  - Assess/evaluate cause of self-care deficits   - Assess range of motion  - Assess patient's mobility  - Assess patient's need for assistive devices and provide as appropriate  - Encourage maximum independence but intervene and supervise when necessary  - Involve family in performance of ADLs  - Assess for home care needs following discharge   - Consider OT consult to assist with ADL evaluation and planning for discharge  - Provide patient education as appropriate  Outcome: Progressing  Goal: Maintain proper alignment of affected body part  Description: INTERVENTIONS:  - Support, maintain and protect limb and body alignment  - Provide patient/ family with appropriate education  Outcome: Progressing

## 2021-05-11 NOTE — PHYSICAL THERAPY NOTE
PHYSICAL THERAPY NOTE          Patient Name: Ian Hou  XUDFV'[id number] Date: 5/11/2021 05/11/21 7888   Note Type   Note Type Treatment   Restrictions/Precautions   Weight Bearing Precautions Per Order No   Other Precautions Fall Risk; Chair Alarm;1:1   Cognition   Overall Cognitive Status Impaired   Following Commands Follows all commands and directions without difficulty   Subjective   Subjective Reports she still feels a little dizzy but is feeling better  Transfers   Sit to Stand 5  Supervision   Stand to Sit 5  Supervision   Ambulation/Elevation   Gait pattern Short stride   Gait Assistance 5  Supervision   Additional items Assist x 1   Assistive Device None  (pushing IV pole)   Distance 200' x 2   Balance   Ambulatory Fair +   Endurance Deficit   Endurance Deficit No   Activity Tolerance   Activity Tolerance Patient tolerated treatment well   Assessment   Prognosis Good   Problem List Decreased mobility   Assessment Pt  seen for physical therapy to improve mobility  Pt  Alysha Gaw increased distance very well with improving endurance and no episodes LOB  The patient's AM-PAC Basic Mobility Inpatient Short Form Raw Score is 23, Standardized Score is 50  88  A standardized score greater than 42 9 suggests the patient may benefit from discharge to home  Please also refer to the recommendation of the Physical Therapist for safe discharge planning  Recommend no rehabilitation needs  Goals   LTG Expiration Date 05/23/21   PT Treatment Day 2   Plan   Treatment/Interventions LE strengthening/ROM; Elevations; Therapeutic exercise; Endurance training   Progress Progressing toward goals   Recommendation   PT Discharge Recommendation No rehabilitation needs   AM-PAC Basic Mobility Inpatient   Turning in Bed Without Bedrails 4   Lying on Back to Sitting on Edge of Flat Bed 4   Moving Bed to Chair 4   Standing Up From Chair 4   Walk in Room 4 Climb 3-5 Stairs 3   Basic Mobility Inpatient Raw Score 23   Basic Mobility Standardized Score 50 88   Pt  OOB in chair  with call bell within reach end of PT session  Discussed with  PT today's treatment and patient's current level of function for care coordination  1:1 supervision in room

## 2021-05-11 NOTE — ASSESSMENT & PLAN NOTE
With initial TSH elevation in setting of acute illness - free T4 normal X 2  This very likely represents TSH fluctuation and sick euthyroid syndrome in the setting of acute illness and drug overdose  Recommendations for repeat TSH in a few weeks when no longer ill

## 2021-05-12 NOTE — UTILIZATION REVIEW
Jorje Davila MD   Physician   Hospitalist   Discharge Summary      Signed   Date of Service:  5/11/2021 12:47 PM               Signed             Show:Clear all  [x]Manual[x]Template[x]Copied    Added by:  [x]Rangel Olivares MD    []Dora for details  [facility] [address]  Discharge- Keily Dumont 1995, 23 y o  female MRN: [id number]  Unit/Bed#: 407-01 Encounter: [id number]  Primary Care Provider: Regi Todd PA-C   Date and time admitted to hospital: 5/7/2021 10:14 PM     * Anticholinergic drug overdose  Assessment & Plan  Intentional overdose on Diphenhydramine and Alprazolam   This represented a suicide attempt       · Patient is currently a 12 - will require a 302 enacted if she decides to leave  She is not able to sign out AMA  · The patient was seen in consultation by Toxicology  · The patient did not receive physostigmine in the ED  · Was maintained on IV fluids  · Also maintained on 1:1 observation and suicide precautions  · Evaluated by behavioral health - accepted in transfer to the [address] [place] [address] psychiatric facility  · Per Toxicology recommendations, the patient is now considered medically cleared for transfer to psych unit:  ? Prolonged QT interval has normalized  ? Mental status remains at baseline  ? Vital signs remain stable  ? Patient is ambulatory        Toxic encephalopathy  Assessment & Plan  Secondary to an intentional anticholinergic drug overdose along with benzodiazepine use  Mental status is at baseline, with encephalopathy resolved      Elevated TSH  Assessment & Plan  With initial TSH elevation in setting of acute illness - free T4 normal X 2   This very likely represents TSH fluctuation and sick euthyroid syndrome in the setting of acute illness and drug overdose      Recommendations for repeat TSH in a few weeks when no longer ill      Prolonged QT interval  Assessment & Plan  Received magnesium sulfate 2 grams IV x 1 dose on 05/08/2021  QT interval has normalized      Depression with anxiety  Assessment & Plan  Noted  For inpatient psychiatric hospitalization and treatment as above      Anemia  Assessment & Plan  Confirmed iron deficiency in young menstruating female      Urinary retention  Assessment & Plan  Secondary to anticholinergic drug overdose  Serna catheter was removed on 05/09/2021  No evidence of further retention      Tobacco use  Assessment & Plan  Nicotine patch remains in place      Discharging Physician / Practitioner: Phillip Madrigal MD  PCP: Polo Rollins PA-C  Admission Date:       Admission Orders (From admission, onward)              Ordered          05/10/21 1524   Inpatient Admission  Once           05/08/21 0124   Place in Observation  Once                       Discharge Date: 05/11/21          Resolved Problems  Date Reviewed: 5/11/2021     None             Consultations During Hospital Stay:  · Medical toxicology  · Behavioral Health     Procedures Performed:   · None     Significant Findings / Test Results:   Ct Head Without Contrast     Result Date: 5/8/2021  Impression: No acute intracranial abnormality  Workstation performed: [id number]      Incidental Findings:   · None      Test Results Pending at Discharge (will require follow up): · None     Outpatient Tests Requested:  · None     Complications:  None     Reason for Admission:  Intentional drug overdose      HPI from original H&P:      Melvin Patton is a 25 y o  female who presents to the emergency room for evaluation of  altered mental status probably secondary to Benadryl overdose  Patient has a past medical history depression and psychiatric admission after an intentional Benadryl overdose in November of 2020   She was brought to the emergency room by her boyfriend and her mother  Thais Vieyra boyfriend reported that she texted him earlier stating that she did not have a good day  Madhu Linder states that he attempted to reach her subsequent to that text but  Was unsuccessful  Geratone Hardin mother also reported trying to return was not able to  Faithnadeen Mata was later found at a gas station on [address] [place] [address] arrival to the ER patient was noted to be confused, nonverbal tachycardic, tachypneic   Labs completed in emergency room with results as shown below   CT head completed with results as shown below  [facility] emergency room she received magnesium sulfate 2 g IV, potassium chloride 20 mEq p o  normal saline 1 L  In the ER attending discussed case with poison Control   Recommendations given to continue supportive care, IV fluids serial EKGs and monitor QRS  Sodium bicarbonate 1-2 mEq per kg if care is found to being more than 100 mL seconds and benzodiazepines for agitation  Upon arrival to the [address] [place], [place] [address]vd is still  Nonverbal, disoriented with a blank stare and dilated pupils  She  Is still tachycardic and tachypneic   Patient has been admitted on inpatient  Step-down level care for further workup and management of acute encephalopathy,  Possible anticholinergic drug overdose, hypokalemia, elevated TSH     Please see above list of diagnoses and related plan for additional information       Condition at Discharge: stable      Discharge Day Visit / Exam:      * Please refer to separate progress note for these details *     Discharge instructions/Information to patient and family:   See after visit summary for information provided to patient and family        Provisions for Follow-Up Care:  See after visit summary for information related to follow-up care and any pertinent home health orders        Disposition:      Inpatient Psychiatry at [facility]      For Discharges to [facility] SNF:   · Not Applicable to this Patient - Not Applicable to this Patient     Planned Readmission: No     Discharge Statement:  I spent 40 minutes discharging the patient  This time was spent on the day of discharge   I had direct contact with the patient on the day of discharge  Greater than 50% of the total time was spent examining patient, answering all patient questions, arranging and discussing plan of care with patient as well as directly providing post-discharge instructions    Additional time then spent on discharge activities      Discharge Medications:  See after visit summary for reconciled discharge medications provided to patient and family        ** Please Note: This note has been constructed using a voice recognition system **

## 2021-06-13 ENCOUNTER — HOSPITAL ENCOUNTER (EMERGENCY)
Facility: HOSPITAL | Age: 26
Discharge: HOME | End: 2021-06-13
Attending: EMERGENCY MEDICINE | Admitting: EMERGENCY MEDICINE
Payer: COMMERCIAL

## 2021-06-13 VITALS
HEIGHT: 63 IN | HEART RATE: 101 BPM | DIASTOLIC BLOOD PRESSURE: 85 MMHG | RESPIRATION RATE: 18 BRPM | BODY MASS INDEX: 28.09 KG/M2 | TEMPERATURE: 100 F | OXYGEN SATURATION: 98 % | SYSTOLIC BLOOD PRESSURE: 136 MMHG | WEIGHT: 158.51 LBS

## 2021-06-13 DIAGNOSIS — A08.4 VIRAL GASTROENTERITIS: Primary | ICD-10-CM

## 2021-06-13 DIAGNOSIS — R11.0 NAUSEA: ICD-10-CM

## 2021-06-13 DIAGNOSIS — R19.7 DIARRHEA: ICD-10-CM

## 2021-06-13 DIAGNOSIS — R50.9 FEVER: ICD-10-CM

## 2021-06-13 LAB
ALBUMIN SERPL BCP-MCNC: 4.2 G/DL (ref 3.5–5)
ALP SERPL-CCNC: 44 U/L (ref 46–116)
ALT SERPL W P-5'-P-CCNC: 23 U/L (ref 12–78)
ANION GAP SERPL CALCULATED.3IONS-SCNC: 10 MMOL/L (ref 4–13)
AST SERPL W P-5'-P-CCNC: 15 U/L (ref 5–45)
BASOPHILS # BLD AUTO: 0.01 THOUSANDS/ΜL (ref 0–0.1)
BASOPHILS NFR BLD AUTO: 0 % (ref 0–1)
BILIRUB SERPL-MCNC: 0.44 MG/DL (ref 0.2–1)
BILIRUB UR QL STRIP: NEGATIVE
BUN SERPL-MCNC: 7 MG/DL (ref 5–25)
CALCIUM SERPL-MCNC: 9 MG/DL (ref 8.3–10.1)
CHLORIDE SERPL-SCNC: 103 MMOL/L (ref 100–108)
CLARITY UR: CLEAR
CO2 SERPL-SCNC: 25 MMOL/L (ref 21–32)
COLOR UR: YELLOW
CREAT SERPL-MCNC: 0.57 MG/DL (ref 0.6–1.3)
EOSINOPHIL # BLD AUTO: 0.11 THOUSAND/ΜL (ref 0–0.61)
EOSINOPHIL NFR BLD AUTO: 2 % (ref 0–6)
ERYTHROCYTE [DISTWIDTH] IN BLOOD BY AUTOMATED COUNT: 14.7 % (ref 11.6–15.1)
EXT PREG TEST URINE: NEGATIVE
EXT. CONTROL ED NAV: NORMAL
GFR SERPL CREATININE-BSD FRML MDRD: 130 ML/MIN/1.73SQ M
GLUCOSE SERPL-MCNC: 97 MG/DL (ref 65–140)
GLUCOSE UR STRIP-MCNC: NEGATIVE MG/DL
HCT VFR BLD AUTO: 33.5 % (ref 34.8–46.1)
HGB BLD-MCNC: 10.7 G/DL (ref 11.5–15.4)
HGB UR QL STRIP.AUTO: NEGATIVE
IMM GRANULOCYTES # BLD AUTO: 0.02 THOUSAND/UL (ref 0–0.2)
IMM GRANULOCYTES NFR BLD AUTO: 0 % (ref 0–2)
KETONES UR STRIP-MCNC: NEGATIVE MG/DL
LEUKOCYTE ESTERASE UR QL STRIP: NEGATIVE
LIPASE SERPL-CCNC: 65 U/L (ref 73–393)
LYMPHOCYTES # BLD AUTO: 0.75 THOUSANDS/ΜL (ref 0.6–4.47)
LYMPHOCYTES NFR BLD AUTO: 11 % (ref 14–44)
MCH RBC QN AUTO: 26.6 PG (ref 26.8–34.3)
MCHC RBC AUTO-ENTMCNC: 31.9 G/DL (ref 31.4–37.4)
MCV RBC AUTO: 83 FL (ref 82–98)
MONOCYTES # BLD AUTO: 0.44 THOUSAND/ΜL (ref 0.17–1.22)
MONOCYTES NFR BLD AUTO: 7 % (ref 4–12)
NEUTROPHILS # BLD AUTO: 5.35 THOUSANDS/ΜL (ref 1.85–7.62)
NEUTS SEG NFR BLD AUTO: 80 % (ref 43–75)
NITRITE UR QL STRIP: NEGATIVE
NRBC BLD AUTO-RTO: 0 /100 WBCS
PH UR STRIP.AUTO: 6 [PH]
PLATELET # BLD AUTO: 226 THOUSANDS/UL (ref 149–390)
PMV BLD AUTO: 11.1 FL (ref 8.9–12.7)
POTASSIUM SERPL-SCNC: 3.7 MMOL/L (ref 3.5–5.3)
PROT SERPL-MCNC: 7.7 G/DL (ref 6.4–8.2)
PROT UR STRIP-MCNC: NEGATIVE MG/DL
RBC # BLD AUTO: 4.02 MILLION/UL (ref 3.81–5.12)
SARS-COV-2 RNA RESP QL NAA+PROBE: NEGATIVE
SODIUM SERPL-SCNC: 138 MMOL/L (ref 136–145)
SP GR UR STRIP.AUTO: 1.01 (ref 1–1.03)
UROBILINOGEN UR QL STRIP.AUTO: 0.2 E.U./DL
WBC # BLD AUTO: 6.68 THOUSAND/UL (ref 4.31–10.16)

## 2021-06-13 PROCEDURE — 36415 COLL VENOUS BLD VENIPUNCTURE: CPT | Performed by: EMERGENCY MEDICINE

## 2021-06-13 PROCEDURE — 85025 COMPLETE CBC W/AUTO DIFF WBC: CPT | Performed by: EMERGENCY MEDICINE

## 2021-06-13 PROCEDURE — 96374 THER/PROPH/DIAG INJ IV PUSH: CPT

## 2021-06-13 PROCEDURE — 99284 EMERGENCY DEPT VISIT MOD MDM: CPT | Performed by: EMERGENCY MEDICINE

## 2021-06-13 PROCEDURE — 96375 TX/PRO/DX INJ NEW DRUG ADDON: CPT

## 2021-06-13 PROCEDURE — U0003 INFECTIOUS AGENT DETECTION BY NUCLEIC ACID (DNA OR RNA); SEVERE ACUTE RESPIRATORY SYNDROME CORONAVIRUS 2 (SARS-COV-2) (CORONAVIRUS DISEASE [COVID-19]), AMPLIFIED PROBE TECHNIQUE, MAKING USE OF HIGH THROUGHPUT TECHNOLOGIES AS DESCRIBED BY CMS-2020-01-R: HCPCS | Performed by: EMERGENCY MEDICINE

## 2021-06-13 PROCEDURE — 81025 URINE PREGNANCY TEST: CPT | Performed by: EMERGENCY MEDICINE

## 2021-06-13 PROCEDURE — 99283 EMERGENCY DEPT VISIT LOW MDM: CPT

## 2021-06-13 PROCEDURE — 83690 ASSAY OF LIPASE: CPT | Performed by: EMERGENCY MEDICINE

## 2021-06-13 PROCEDURE — U0005 INFEC AGEN DETEC AMPLI PROBE: HCPCS | Performed by: EMERGENCY MEDICINE

## 2021-06-13 PROCEDURE — 96361 HYDRATE IV INFUSION ADD-ON: CPT

## 2021-06-13 PROCEDURE — 80053 COMPREHEN METABOLIC PANEL: CPT | Performed by: EMERGENCY MEDICINE

## 2021-06-13 PROCEDURE — 81003 URINALYSIS AUTO W/O SCOPE: CPT | Performed by: EMERGENCY MEDICINE

## 2021-06-13 RX ORDER — METOCLOPRAMIDE HYDROCHLORIDE 5 MG/ML
10 INJECTION INTRAMUSCULAR; INTRAVENOUS ONCE
Status: COMPLETED | OUTPATIENT
Start: 2021-06-13 | End: 2021-06-13

## 2021-06-13 RX ORDER — DICYCLOMINE HCL 20 MG
20 TABLET ORAL 2 TIMES DAILY
Qty: 20 TABLET | Refills: 0 | Status: SHIPPED | OUTPATIENT
Start: 2021-06-13 | End: 2022-05-03 | Stop reason: HOSPADM

## 2021-06-13 RX ORDER — METOCLOPRAMIDE 10 MG/1
10 TABLET ORAL EVERY 6 HOURS PRN
Qty: 28 TABLET | Refills: 0 | Status: SHIPPED | OUTPATIENT
Start: 2021-06-13 | End: 2022-05-03 | Stop reason: HOSPADM

## 2021-06-13 RX ORDER — KETOROLAC TROMETHAMINE 30 MG/ML
15 INJECTION, SOLUTION INTRAMUSCULAR; INTRAVENOUS ONCE
Status: COMPLETED | OUTPATIENT
Start: 2021-06-13 | End: 2021-06-13

## 2021-06-13 RX ADMIN — SODIUM CHLORIDE 1000 ML: 0.9 INJECTION, SOLUTION INTRAVENOUS at 21:43

## 2021-06-13 RX ADMIN — KETOROLAC TROMETHAMINE 15 MG: 30 INJECTION, SOLUTION INTRAMUSCULAR; INTRAVENOUS at 21:44

## 2021-06-13 RX ADMIN — METOCLOPRAMIDE HYDROCHLORIDE 10 MG: 5 INJECTION INTRAMUSCULAR; INTRAVENOUS at 21:45

## 2021-06-14 NOTE — ED NOTES
Patient stated she took tylenol 1hr ago, temp at home was 102 6  Also stated she has had diarrhea X3, green in color        Jason Batista RN  06/13/21 2129       Jason Batista, [address] [place] [address]  06/13/21 2130

## 2021-06-14 NOTE — ED PROVIDER NOTES
History  Chief Complaint   Patient presents with    Fever - 9 weeks to 74 years     reports fever, nasuea and dry heaving today  46-year-old female with past medical history of psychiatric disorder who is presenting for evaluation of fever, nausea, dry heaving, intermittent abdominal cramping, and diarrhea  Patient was in her usual state of health until this afternoon when her symptoms developed  She did start to have nausea after starting new medications recently, specifically Wellbutrin and BuSpar  However, her symptoms today are different  The patient reports that she developed a fever as high as 102 6° at home  She took Tylenol prior to arrival   The patient states that she also has been having nausea with intermittent dry heaving  No vomiting, although the patient has not had anything to eat today secondary to her nausea  The patient reports intermittent periumbilical abdominal cramping  She has no abdominal pain at this time  The patient reports having 3 loose bowel movements today  Her stool was green; no melena or hematochezia  Patient denies any urinary symptoms such as dysuria urinary frequency  No URI symptoms, ear pain, cough, shortness of breath, chest pain, or any other complaints  No known sick contacts  No history of abdominal surgeries  Prior to Admission Medications   Prescriptions Last Dose Informant Patient Reported? Taking?    Doxylamine Succinate, Sleep, (SLEEP AID PO)   Yes No   Sig: Take by mouth daily at bedtime as needed (sleep)    lactulose 20 g/30 mL   No No   Sig: Take 30 mL (20 g total) by mouth 2 (two) times a day Prn constipation   nicotine (NICODERM CQ) 7 mg/24hr TD 24 hr patch   No No   Sig: Place 1 patch on the skin daily   polyethylene glycol (MIRALAX) 17 g packet   No No   Sig: Take 17 g by mouth daily as needed (Constipation)      Facility-Administered Medications: None       Past Medical History:   Diagnosis Date    Depression     Insomnia     Psychiatric disorder     UTI (urinary tract infection)     Vertigo        Past Surgical History:   Procedure Laterality Date    WISDOM TOOTH EXTRACTION         Family History   Problem Relation Age of Onset    Heart disease Father     Cancer Paternal Grandmother         breast     I have reviewed and agree with the history as documented  E-Cigarette/Vaping    E-Cigarette Use Never User      E-Cigarette/Vaping Substances    Nicotine No     THC No     CBD No     Flavoring No     Other No     Unknown No      Social History     Tobacco Use    Smoking status: Current Every Day Smoker     Packs/day: 1 00     Types: Cigarettes    Smokeless tobacco: Never Used    Tobacco comment: trying to cut back   Vaping Use    Vaping Use: Never used   Substance Use Topics    Alcohol use: Yes     Comment: occasional    Drug use: No       Review of Systems   Constitutional: Positive for fever  Negative for diaphoresis and unexpected weight change  HENT: Negative for congestion, rhinorrhea and sore throat  Eyes: Negative for pain, discharge and visual disturbance  Respiratory: Negative for cough, shortness of breath and wheezing  Cardiovascular: Negative for chest pain, palpitations and leg swelling  Gastrointestinal: Positive for abdominal pain (intermittent abdominal cramping), diarrhea, nausea and vomiting  Negative for blood in stool and constipation  Genitourinary: Negative for dysuria, flank pain and hematuria  Musculoskeletal: Negative for arthralgias and joint swelling  Skin: Negative for rash and wound  Allergic/Immunologic: Negative for environmental allergies and food allergies  Neurological: Negative for dizziness, seizures, weakness and numbness  Hematological: Negative for adenopathy  Psychiatric/Behavioral: Negative for confusion and hallucinations  Physical Exam  Physical Exam  Vitals and nursing note reviewed     Constitutional:       General: She is not in acute distress  Appearance: She is well-developed  HENT:      Head: Normocephalic and atraumatic  Right Ear: External ear normal       Left Ear: External ear normal       Mouth/Throat:      Mouth: Mucous membranes are dry  Eyes:      Conjunctiva/sclera: Conjunctivae normal       Pupils: Pupils are equal, round, and reactive to light  Cardiovascular:      Rate and Rhythm: Regular rhythm  Tachycardia present  Heart sounds: Normal heart sounds  No murmur heard  Pulmonary:      Effort: Pulmonary effort is normal  No respiratory distress  Breath sounds: Normal breath sounds  No wheezing or rales  Abdominal:      General: Bowel sounds are normal  There is no distension  Palpations: Abdomen is soft  Tenderness: There is no abdominal tenderness  There is no guarding  Comments: Abdomen is soft and nontender throughout  Musculoskeletal:         General: No deformity  Normal range of motion  Skin:     General: Skin is warm and dry  Capillary Refill: Capillary refill takes less than 2 seconds  Neurological:      Mental Status: She is alert and oriented to person, place, and time  Comments: No gross motor deficits noted  Cranial nerves II-XII are intact  Speech is normal, without dysarthria or aphasia     Psychiatric:         Mood and Affect: Mood normal          Behavior: Behavior normal          Vital Signs  ED Triage Vitals [06/13/21 2121]   Temperature Pulse Respirations Blood Pressure SpO2   100 °F (37 8 °C) 101 18 136/85 98 %      Temp Source Heart Rate Source Patient Position - Orthostatic VS BP Location FiO2 (%)   Oral Monitor Sitting Left arm --      Pain Score       3           Vitals:    06/13/21 2121   BP: 136/85   Pulse: 101   Patient Position - Orthostatic VS: Sitting         Visual Acuity      ED Medications  Medications   sodium chloride 0 9 % bolus 1,000 mL (0 mL Intravenous Stopped 6/13/21 2235)   metoclopramide (REGLAN) injection 10 mg (10 mg Intravenous Given 6/13/21 2145)   ketorolac (TORADOL) injection 15 mg (15 mg Intravenous Given 6/13/21 2144)       Diagnostic Studies  Results Reviewed     Procedure Component Value Units Date/Time    Novel Coronavirus [facility]on[facility] [[id number]]  (Normal) Collected: 06/13/21 2142    Lab Status: Final result Specimen: Nares from Nasopharyngeal Swab Updated: 06/13/21 2245     SARS-CoV-2 Negative    Narrative: The specimen collection materials, transport medium, and/or testing methodology utilized in the production of these test results have been proven to be reliable in a limited validation with an abbreviated program under the Emergency Utilization Authorization provided by the FDA  Testing reported as "Presumptive positive" will be confirmed with secondary testing to ensure result accuracy  Clinical caution and judgement should be used with the interpretation of these results with consideration of the clinical impression and other laboratory testing  Testing reported as "Positive" or "Negative" has been proven to be accurate according to standard laboratory validation requirements  All testing is performed with control materials showing appropriate reactivity at standard intervals        POCT pregnancy, urine [[id number]]  (Normal) Resulted: 06/13/21 2140    Lab Status: Final result Updated: 06/13/21 2236     EXT PREG TEST UR (Ref: Negative) negative     Control valid    Comprehensive metabolic panel [[id number]]  (Abnormal) Collected: 06/13/21 2142    Lab Status: Final result Specimen: Blood from Arm, Right Updated: 06/13/21 2216     Sodium 138 mmol/L      Potassium 3 7 mmol/L      Chloride 103 mmol/L      CO2 25 mmol/L      ANION GAP 10 mmol/L      BUN 7 mg/dL      Creatinine 0 57 mg/dL      Glucose 97 mg/dL      Calcium 9 0 mg/dL      AST 15 U/L      ALT 23 U/L      Alkaline Phosphatase 44 U/L      Total Protein 7 7 g/dL      Albumin 4 2 g/dL      Total Bilirubin 0 44 mg/dL      eGFR 130 ml/min/1 73sq m Narrative:      National Kidney Disease Foundation guidelines for Chronic Kidney Disease (CKD):     Stage 1 with normal or high GFR (GFR > 90 mL/min/1 73 square meters)    Stage 2 Mild CKD (GFR = 60-89 mL/min/1 73 square meters)    Stage 3A Moderate CKD (GFR = 45-59 mL/min/1 73 square meters)    Stage 3B Moderate CKD (GFR = 30-44 mL/min/1 73 square meters)    Stage 4 Severe CKD (GFR = 15-29 mL/min/1 73 square meters)    Stage 5 End Stage CKD (GFR <15 mL/min/1 73 square meters)  Note: GFR calculation is accurate only with a steady state creatinine    Lipase [[id number]]  (Abnormal) Collected: 06/13/21 2142    Lab Status: Final result Specimen: Blood from Arm, Right Updated: 06/13/21 2212     Lipase 65 u/L     UA w Reflex to Microscopic w Reflex to Culture [[id number]] Collected: 06/13/21 2142    Lab Status: Final result Specimen: Urine, Clean Catch Updated: 06/13/21 2154     Color, UA Yellow     Clarity, UA Clear     Specific Gravity, UA 1 010     pH, UA 6 0     Leukocytes, UA Negative     Nitrite, UA Negative     Protein, UA Negative mg/dl      Glucose, UA Negative mg/dl      Ketones, UA Negative mg/dl      Urobilinogen, UA 0 2 E U /dl      Bilirubin, UA Negative     Blood, UA Negative    CBC and differential [[id number]]  (Abnormal) Collected: 06/13/21 2142    Lab Status: Final result Specimen: Blood from Arm, Right Updated: 06/13/21 2154     WBC 6 68 Thousand/uL      RBC 4 02 Million/uL      Hemoglobin 10 7 g/dL      Hematocrit 33 5 %      MCV 83 fL      MCH 26 6 pg      MCHC 31 9 g/dL      RDW 14 7 %      MPV 11 1 fL      Platelets 438 Thousands/uL      nRBC 0 /100 WBCs      Neutrophils Relative 80 %      Immat GRANS % 0 %      Lymphocytes Relative 11 %      Monocytes Relative 7 %      Eosinophils Relative 2 %      Basophils Relative 0 %      Neutrophils Absolute 5 35 Thousands/µL      Immature Grans Absolute 0 02 Thousand/uL      Lymphocytes Absolute 0 75 Thousands/µL      Monocytes Absolute 0 44 Thousand/µL Eosinophils Absolute 0 11 Thousand/µL      Basophils Absolute 0 01 Thousands/µL                  No orders to display              Procedures  Procedures         ED Course  ED Course as of Jun 14 0138   Skyler Lay Jun 13, 2021 2126 Temperature: 100 °F (37 8 °C)   2126 Pulse: 101   2154 WBC: 6 68   2154 Possible hemoconcentration or improving with time  Hemoglobin(!): 10 7   2154 Leukocytes, UA: Negative   2154 Nitrite, UA: Negative   2154 Blood, UA: Negative   2213 Lipase(!): 65   2225 Patient updated on lab results  She reported feeling improved with treatment  Robert is still pending but will contact the patient regarding her results  Lower index suspicion for COVID given absence of any respiratory symptoms  SBIRT 20yo+      Most Recent Value   SBIRT (22 yo +)   In order to provide better care to our patients, we are screening all of our patients for alcohol and drug use  Would it be okay to ask you these screening questions? Yes Filed at: 06/13/2021 2124   Initial Alcohol Screen: US AUDIT-C    1  How often do you have a drink containing alcohol? 2 Filed at: 06/13/2021 2124   2  How many drinks containing alcohol do you have on a typical day you are drinking? 1 Filed at: 06/13/2021 2124   3a  Male UNDER 65: How often do you have five or more drinks on one occasion? 0 Filed at: 06/13/2021 2124   3b  FEMALE Any Age, or MALE 65+: How often do you have 4 or more drinks on one occassion? 0 Filed at: 06/13/2021 2124   Audit-C Score  3 Filed at: 06/13/2021 2124   ARACELI: How many times in the past year have you    Used an illegal drug or used a prescription medication for non-medical reasons?   Never Filed at: 06/13/2021 2124                    MDM  Number of Diagnoses or Management Options  Diarrhea: new and does not require workup  Fever: new and requires workup  Nausea: new and requires workup  Viral gastroenteritis: new and does not require workup  Diagnosis management comments: Clinical presentation consistent with viral gastroenteritis  The patient was treated symptomatically with IV fluids, IV Reglan, and IV Toradol with improvement in her symptoms  I had a low index of suspicion for COVID-19 given that the patient had only GI symptoms, but decided to test her to exclude this diagnosis  Labs were unremarkable  Urinalysis did not show any abnormalities  The patient felt improved after symptomatic treatment  She was prescribed Reglan and Bentyl for use at home  Testing for COVID-19 was ultimately negative  Recommended PCP follow-up  Discussed return precautions  Patient verbalized understanding of instructions  Amount and/or Complexity of Data Reviewed  Clinical lab tests: ordered and reviewed  Decide to obtain previous medical records or to obtain history from someone other than the patient: yes  Review and summarize past medical records: yes    Risk of Complications, Morbidity, and/or Mortality  Presenting problems: moderate  Diagnostic procedures: minimal  Management options: minimal    Patient Progress  Patient progress: improved      Disposition  Final diagnoses:   Viral gastroenteritis   Nausea   Diarrhea   Fever     Time reflects when diagnosis was documented in both MDM as applicable and the Disposition within this note     Time User Action Codes Description Comment    6/13/2021 10:26 PM Joellyn [place] Add [A08 4] Viral gastroenteritis     6/13/2021 10:26 PM Joellyn [place] Add [R11 0] Nausea     6/13/2021 10:26 PM Joellyn [place] Add [R19 7] Diarrhea     6/13/2021 10:26 PM Joellyn [place] Add [R50 9] Fever       ED Disposition     ED Disposition Condition Date/Time Comment    Discharge Good Sun Jun 13, 2021 10:26 PM Maia Benavides discharge to home/self care              Follow-up Information     Follow up With Specialties Details Why Contact Info [facility] Call in 1 day Please follow-up with your PCP within 1-2 weeks for a recheck  [phone]  [address] [phone]       [facility] Emergency Department Emergency Medicine Go to  If symptoms worsen  [place]  [id number] [address] [facility] Emergency Department, [address], [address]          Discharge Medication List as of 6/13/2021 10:29 PM      START taking these medications    Details   dicyclomine (BENTYL) 20 mg tablet Take 1 tablet (20 mg total) by mouth 2 (two) times a day, Starting Sun 6/13/2021, Normal      metoclopramide (REGLAN) 10 mg tablet Take 1 tablet (10 mg total) by mouth every 6 (six) hours as needed (Nausea and vomiting), Starting Sun 6/13/2021, Normal         CONTINUE these medications which have NOT CHANGED    Details   Doxylamine Succinate, Sleep, (SLEEP AID PO) Take by mouth daily at bedtime as needed (sleep) , Historical Med      lactulose 20 g/30 mL Take 30 mL (20 g total) by mouth 2 (two) times a day Prn constipation, Starting Thu 4/15/2021, Normal      nicotine (NICODERM CQ) 7 mg/24hr TD 24 hr patch Place 1 patch on the skin daily, Starting Wed 5/12/2021, Normal      polyethylene glycol (MIRALAX) 17 g packet Take 17 g by mouth daily as needed (Constipation), Starting Tue 5/11/2021, No Print           No discharge procedures on file      PDMP Review     None          ED Provider  Electronically Signed by           Westley Alvarado MD  06/14/21 0140

## 2021-06-14 NOTE — DISCHARGE INSTRUCTIONS
As we discussed, your symptoms are consistent with viral gastroenteritis  This is an infection of the stomach and intestines caused by a virus  Your body will fight off the infection with time  You will likely have symptoms for another couple of days  Take medication for your symptoms  Take Tylenol and ibuprofen as needed for pain and fever  Take Bentyl for abdominal cramping  Take Reglan for nausea  Use Imodium as needed for diarrhea  Stay well hydrated  Follow-up with your PCP within 1-2 weeks for a recheck  Return to the ER with syncopal episodes, chest pain, breathing difficulty, repeated vomiting not improving with medication, worsening or changing abdominal pain, blood in your bowel movements, dehydration, or any other concerning symptoms  We will contact you regarding your COVID-19 test results  You may also be the result on [facility] My Chart if you have it

## 2021-08-11 ENCOUNTER — OFFICE VISIT (OUTPATIENT)
Dept: DENTISTRY | Facility: CLINIC | Age: 26
End: 2021-08-11
Payer: COMMERCIAL

## 2021-08-11 VITALS
WEIGHT: 161 LBS | BODY MASS INDEX: 28.52 KG/M2 | HEART RATE: 66 BPM | SYSTOLIC BLOOD PRESSURE: 121 MMHG | TEMPERATURE: 98 F | DIASTOLIC BLOOD PRESSURE: 81 MMHG

## 2021-08-11 DIAGNOSIS — K03.6 ACCRETIONS ON TEETH: Primary | ICD-10-CM

## 2021-08-11 PROCEDURE — D0190 SCREENING OF A PATIENT: HCPCS | Performed by: DENTAL HYGIENIST

## 2021-08-11 PROCEDURE — D1110 PROPHYLAXIS - ADULT: HCPCS | Performed by: DENTAL HYGIENIST

## 2021-08-11 PROCEDURE — D1330 ORAL HYGIENE INSTRUCTIONS: HCPCS | Performed by: DENTAL HYGIENIST

## 2021-08-11 NOTE — PROGRESS NOTES
Adult Prophy  Chief Complaint   Patient presents with    Routine Oral Cleaning     Patient states LR still hurts sometimes to hot at cold and #9 only if she presses really hard  Patient will monitor pain and call if any changes  Dr BONE noted RTC will we needed if pain worsens or persists  Patient said it has not been as bad so she would like to continually monitor  Rec patient see's Dr Kavitha Adame for an Exam I am concerned about 32- F and Left side -OB areas  Possibly RX Prevident [place]  Patient does smoke  Do not place bib clips on pt  She does not like anything around her neck  Place bib on chest   Pt's jaw hurt whenever she opens  It pops loudly  #30 hurts when cleaning direct facial at the gingival margin    Method Used:  · Prophy Method Used: Hand Scaling  · Polished  · Flossed     Radiographs Taken in Dexis: (Taken to assess periodontal health)  · None  · IO Photos taken - White areas on retromolar pads    Intra/Extra Oral Cancer Screening:  · Within normal limits    Oral Hygiene:  · Fair    Plaque:  · Moderate    Calculus:  · Light    Bleeding:  · Moderate    Stain:  · Light    Periodontal Charting: Showed patient sub calc specks and bone height  Full probing  1-4mm    Periodontal Classification:  · Generalized  · Moderate  · Gingivitis    Oral Hygiene Instruction:    Maty Alert over daily routine and stressed c-shaped flossing  Discussed Oral systemic link and role of plaque in gum disease  No orders of the defined types were placed in this encounter         Next Visit:  6 Month [address]scar [address] 0670  Dentist visit- periodical exam

## 2021-10-18 ENCOUNTER — OFFICE VISIT (OUTPATIENT)
Dept: DENTISTRY | Facility: CLINIC | Age: 26
End: 2021-10-18
Payer: COMMERCIAL

## 2021-10-18 DIAGNOSIS — K02.9 TOOTH DECAY: ICD-10-CM

## 2021-10-18 DIAGNOSIS — Z01.21 ENCOUNTER FOR DENTAL EXAMINATION AND CLEANING WITH ABNORMAL FINDINGS: Primary | ICD-10-CM

## 2021-10-18 PROCEDURE — D0220 INTRAORAL - PERIAPICAL FIRST RADIOGRAPHIC IMAGE: HCPCS | Performed by: STUDENT IN AN ORGANIZED HEALTH CARE EDUCATION/TRAINING PROGRAM

## 2021-10-18 PROCEDURE — D0120 PERIODIC ORAL EVALUATION - ESTABLISHED PATIENT: HCPCS | Performed by: STUDENT IN AN ORGANIZED HEALTH CARE EDUCATION/TRAINING PROGRAM

## 2022-01-04 ENCOUNTER — HOSPITAL ENCOUNTER (EMERGENCY)
Facility: HOSPITAL | Age: 27
Discharge: HOME | End: 2022-01-04
Attending: EMERGENCY MEDICINE | Admitting: EMERGENCY MEDICINE
Payer: COMMERCIAL

## 2022-01-04 VITALS
RESPIRATION RATE: 18 BRPM | HEART RATE: 95 BPM | WEIGHT: 160 LBS | SYSTOLIC BLOOD PRESSURE: 130 MMHG | DIASTOLIC BLOOD PRESSURE: 86 MMHG | OXYGEN SATURATION: 98 % | HEIGHT: 63 IN | TEMPERATURE: 100.2 F | BODY MASS INDEX: 28.35 KG/M2

## 2022-01-04 DIAGNOSIS — G43.909 MIGRAINE: ICD-10-CM

## 2022-01-04 DIAGNOSIS — Z34.90 PREGNANCY: ICD-10-CM

## 2022-01-04 DIAGNOSIS — R51.9 HEADACHE: Primary | ICD-10-CM

## 2022-01-04 LAB
ALBUMIN SERPL BCP-MCNC: 3.8 G/DL (ref 3.5–5)
ALP SERPL-CCNC: 45 U/L (ref 46–116)
ALT SERPL W P-5'-P-CCNC: 28 U/L (ref 12–78)
ANION GAP SERPL CALCULATED.3IONS-SCNC: 14 MMOL/L (ref 4–13)
AST SERPL W P-5'-P-CCNC: 18 U/L (ref 5–45)
BASOPHILS # BLD AUTO: 0.01 THOUSANDS/ΜL (ref 0–0.1)
BASOPHILS NFR BLD AUTO: 0 % (ref 0–1)
BILIRUB SERPL-MCNC: 0.29 MG/DL (ref 0.2–1)
BILIRUB UR QL STRIP: NEGATIVE
BUN SERPL-MCNC: 4 MG/DL (ref 5–25)
CALCIUM SERPL-MCNC: 9.1 MG/DL (ref 8.3–10.1)
CHLORIDE SERPL-SCNC: 104 MMOL/L (ref 100–108)
CLARITY UR: NORMAL
CO2 SERPL-SCNC: 21 MMOL/L (ref 21–32)
COLOR UR: NORMAL
CREAT SERPL-MCNC: 0.44 MG/DL (ref 0.6–1.3)
EOSINOPHIL # BLD AUTO: 0 THOUSAND/ΜL (ref 0–0.61)
EOSINOPHIL NFR BLD AUTO: 0 % (ref 0–6)
ERYTHROCYTE [DISTWIDTH] IN BLOOD BY AUTOMATED COUNT: 15.6 % (ref 11.6–15.1)
GFR SERPL CREATININE-BSD FRML MDRD: 141 ML/MIN/1.73SQ M
GLUCOSE SERPL-MCNC: 101 MG/DL (ref 65–140)
GLUCOSE UR STRIP-MCNC: NEGATIVE MG/DL
HCT VFR BLD AUTO: 33.6 % (ref 34.8–46.1)
HGB BLD-MCNC: 11.7 G/DL (ref 11.5–15.4)
HGB UR QL STRIP.AUTO: NEGATIVE
IMM GRANULOCYTES # BLD AUTO: 0.08 THOUSAND/UL (ref 0–0.2)
IMM GRANULOCYTES NFR BLD AUTO: 1 % (ref 0–2)
KETONES UR STRIP-MCNC: NEGATIVE MG/DL
LEUKOCYTE ESTERASE UR QL STRIP: NEGATIVE
LYMPHOCYTES # BLD AUTO: 0.51 THOUSANDS/ΜL (ref 0.6–4.47)
LYMPHOCYTES NFR BLD AUTO: 9 % (ref 14–44)
MCH RBC QN AUTO: 28.7 PG (ref 26.8–34.3)
MCHC RBC AUTO-ENTMCNC: 34.8 G/DL (ref 31.4–37.4)
MCV RBC AUTO: 83 FL (ref 82–98)
MONOCYTES # BLD AUTO: 0.57 THOUSAND/ΜL (ref 0.17–1.22)
MONOCYTES NFR BLD AUTO: 10 % (ref 4–12)
NEUTROPHILS # BLD AUTO: 4.75 THOUSANDS/ΜL (ref 1.85–7.62)
NEUTS SEG NFR BLD AUTO: 80 % (ref 43–75)
NITRITE UR QL STRIP: NEGATIVE
NRBC BLD AUTO-RTO: 0 /100 WBCS
PH UR STRIP.AUTO: 6 [PH]
PLATELET # BLD AUTO: 156 THOUSANDS/UL (ref 149–390)
PMV BLD AUTO: 10.4 FL (ref 8.9–12.7)
POTASSIUM SERPL-SCNC: 3.4 MMOL/L (ref 3.5–5.3)
PROT SERPL-MCNC: 7.6 G/DL (ref 6.4–8.2)
PROT UR STRIP-MCNC: NEGATIVE MG/DL
RBC # BLD AUTO: 4.07 MILLION/UL (ref 3.81–5.12)
SODIUM SERPL-SCNC: 139 MMOL/L (ref 136–145)
SP GR UR STRIP.AUTO: <=1.005 (ref 1–1.03)
UROBILINOGEN UR QL STRIP.AUTO: 0.2 E.U./DL
WBC # BLD AUTO: 5.92 THOUSAND/UL (ref 4.31–10.16)

## 2022-01-04 PROCEDURE — 96365 THER/PROPH/DIAG IV INF INIT: CPT

## 2022-01-04 PROCEDURE — 80053 COMPREHEN METABOLIC PANEL: CPT | Performed by: EMERGENCY MEDICINE

## 2022-01-04 PROCEDURE — 99284 EMERGENCY DEPT VISIT MOD MDM: CPT | Performed by: EMERGENCY MEDICINE

## 2022-01-04 PROCEDURE — 85025 COMPLETE CBC W/AUTO DIFF WBC: CPT | Performed by: EMERGENCY MEDICINE

## 2022-01-04 PROCEDURE — 36415 COLL VENOUS BLD VENIPUNCTURE: CPT | Performed by: EMERGENCY MEDICINE

## 2022-01-04 PROCEDURE — 81003 URINALYSIS AUTO W/O SCOPE: CPT | Performed by: EMERGENCY MEDICINE

## 2022-01-04 PROCEDURE — 87086 URINE CULTURE/COLONY COUNT: CPT | Performed by: EMERGENCY MEDICINE

## 2022-01-04 PROCEDURE — 96375 TX/PRO/DX INJ NEW DRUG ADDON: CPT

## 2022-01-04 PROCEDURE — 99283 EMERGENCY DEPT VISIT LOW MDM: CPT

## 2022-01-04 RX ORDER — METOCLOPRAMIDE HYDROCHLORIDE 5 MG/ML
10 INJECTION INTRAMUSCULAR; INTRAVENOUS ONCE
Status: COMPLETED | OUTPATIENT
Start: 2022-01-04 | End: 2022-01-04

## 2022-01-04 RX ORDER — DIPHENHYDRAMINE HYDROCHLORIDE 50 MG/ML
25 INJECTION INTRAMUSCULAR; INTRAVENOUS ONCE
Status: COMPLETED | OUTPATIENT
Start: 2022-01-04 | End: 2022-01-04

## 2022-01-04 RX ORDER — ONDANSETRON 4 MG/1
4 TABLET, ORALLY DISINTEGRATING ORAL EVERY 6 HOURS PRN
Qty: 20 TABLET | Refills: 0 | Status: SHIPPED | OUTPATIENT
Start: 2022-01-04 | End: 2022-05-03 | Stop reason: HOSPADM

## 2022-01-04 RX ADMIN — SODIUM CHLORIDE, SODIUM LACTATE, POTASSIUM CHLORIDE, AND CALCIUM CHLORIDE 1000 ML: .6; .31; .03; .02 INJECTION, SOLUTION INTRAVENOUS at 08:41

## 2022-01-04 RX ADMIN — METOCLOPRAMIDE HYDROCHLORIDE 10 MG: 5 INJECTION INTRAMUSCULAR; INTRAVENOUS at 08:34

## 2022-01-04 RX ADMIN — DIPHENHYDRAMINE HYDROCHLORIDE 25 MG: 50 INJECTION, SOLUTION INTRAMUSCULAR; INTRAVENOUS at 08:37

## 2022-01-04 NOTE — Clinical Note
Valerie Ulysses was seen and treated in our emergency department on 1/4/2022  Diagnosis:     Luis Miguel Harley  may return to work on return date  She may return on this date: 01/06/2022         If you have any questions or concerns, please don't hesitate to call        Pili Mccartney DO    ______________________________           _______________          _______________  Hospital Representative                              Date                                Time

## 2022-01-04 NOTE — ED PROVIDER NOTES
History  Chief Complaint   Patient presents with    Headache     headache, backache since yesterday  14 weeks pregnant  taking tylenol without relief  Patient is a  at approximately 14 weeks gestation presenting with headache and nausea  Patient states this began yesterday morning  Patient states she has had occasional migraines but this been complicated as she cannot take NSAIDs due to the pregnancy and that usually helps her headaches  Denies any fevers or chills  Does endorse nausea with 1 episode of vomiting  Currently remains nauseated  Decreased p o  Intake secondary to nausea  No recent travel  Patient states today home COVID test which was reportedly negative  Denies any alcohol tobacco or drug abuse  Denies any abdominal pain  Denies any dysuria, hematuria, flank pain, vaginal bleeding cramping spotting or discharge  Patient is not immunocompromised or immunosuppressed  Denies any neck pain or nuchal rigidity  Headache  Associated symptoms: nausea and vomiting    Associated symptoms: no abdominal pain, no back pain, no congestion, no cough, no diarrhea, no dizziness, no ear pain, no eye pain, no fatigue, no fever, no neck pain, no seizures and no sore throat        Prior to Admission Medications   Prescriptions Last Dose Informant Patient Reported? Taking?    Doxylamine Succinate, Sleep, (SLEEP AID PO)   Yes No   Sig: Take by mouth daily at bedtime as needed (sleep)    Sodium Fluoride 1 1 % PSTE   No No   Sig: Apply 1 Squirt to teeth daily at bedtime   dicyclomine (BENTYL) 20 mg tablet   No No   Sig: Take 1 tablet (20 mg total) by mouth 2 (two) times a day   lactulose 20 g/30 mL   No No   Sig: Take 30 mL (20 g total) by mouth 2 (two) times a day Prn constipation   metoclopramide (REGLAN) 10 mg tablet   No No   Sig: Take 1 tablet (10 mg total) by mouth every 6 (six) hours as needed (Nausea and vomiting)   nicotine (NICODERM CQ) 7 mg/24hr TD 24 hr patch   No No   Sig: Place 1 patch on the skin daily   polyethylene glycol (MIRALAX) 17 g packet   No No   Sig: Take 17 g by mouth daily as needed (Constipation)      Facility-Administered Medications: None       Past Medical History:   Diagnosis Date    Depression     Insomnia     Psychiatric disorder     UTI (urinary tract infection)     Vertigo        Past Surgical History:   Procedure Laterality Date    WISDOM TOOTH EXTRACTION         Family History   Problem Relation Age of Onset    Heart disease Father     Cancer Paternal Grandmother         breast     I have reviewed and agree with the history as documented  E-Cigarette/Vaping    E-Cigarette Use Never User      E-Cigarette/Vaping Substances    Nicotine No     THC No     CBD No     Flavoring No     Other No     Unknown No      Social History     Tobacco Use    Smoking status: Former Smoker     Packs/day: 1 00     Types: Cigarettes    Smokeless tobacco: Never Used    Tobacco comment: trying to cut back   Vaping Use    Vaping Use: Never used   Substance Use Topics    Alcohol use: Yes     Comment: occasional    Drug use: No       Review of Systems   Constitutional: Negative for appetite change, chills, fatigue, fever and unexpected weight change  HENT: Negative for congestion, ear pain, rhinorrhea and sore throat  Eyes: Negative for pain and visual disturbance  Respiratory: Negative for cough, chest tightness, shortness of breath and wheezing  Cardiovascular: Negative for chest pain, palpitations and leg swelling  Gastrointestinal: Positive for nausea and vomiting  Negative for abdominal pain, constipation and diarrhea  Genitourinary: Negative for difficulty urinating, dysuria, frequency, hematuria, menstrual problem, pelvic pain, vaginal bleeding and vaginal discharge  Musculoskeletal: Negative for arthralgias, back pain and neck pain  Skin: Negative for color change and rash  Neurological: Positive for headaches   Negative for dizziness, seizures, syncope and light-headedness  Psychiatric/Behavioral: Negative for sleep disturbance  The patient is not nervous/anxious  All other systems reviewed and are negative  Physical Exam  Physical Exam  Vitals and nursing note reviewed  Constitutional:       General: She is not in acute distress  Appearance: Normal appearance  She is well-developed and normal weight  She is not ill-appearing, toxic-appearing or diaphoretic  HENT:      Head: Normocephalic and atraumatic  Nose: Nose normal       Mouth/Throat:      Mouth: Mucous membranes are moist       Pharynx: Oropharynx is clear  Eyes:      General: No scleral icterus  Extraocular Movements: Extraocular movements intact  Conjunctiva/sclera: Conjunctivae normal    Cardiovascular:      Rate and Rhythm: Normal rate and regular rhythm  Pulses: Normal pulses  Heart sounds: Normal heart sounds  No murmur heard  No friction rub  No gallop  Pulmonary:      Effort: Pulmonary effort is normal  No respiratory distress  Breath sounds: Normal breath sounds  No wheezing or rales  Abdominal:      Palpations: Abdomen is soft  There is no mass  Tenderness: There is no abdominal tenderness  There is no right CVA tenderness, left CVA tenderness, guarding or rebound  Hernia: No hernia is present  Comments: Gravid uterus   Musculoskeletal:         General: Normal range of motion  Cervical back: Normal range of motion and neck supple  Right lower leg: No edema  Left lower leg: No edema  Skin:     General: Skin is warm and dry  Capillary Refill: Capillary refill takes less than 2 seconds  Coloration: Skin is not jaundiced or pale  Findings: No lesion or rash  Neurological:      General: No focal deficit present  Mental Status: She is alert and oriented to person, place, and time     Psychiatric:         Mood and Affect: Mood normal          Behavior: Behavior normal          Vital Signs  ED Triage Vitals [01/04/22 0651]   Temperature Pulse Respirations Blood Pressure SpO2   100 2 °F (37 9 °C) (!) 107 20 139/67 98 %      Temp Source Heart Rate Source Patient Position - Orthostatic VS BP Location FiO2 (%)   Tympanic Monitor Sitting Right arm --      Pain Score       10 - Worst Possible Pain           Vitals:    01/04/22 0651 01/04/22 0900   BP: 139/67 134/75   Pulse: (!) 107 92   Patient Position - Orthostatic VS: Sitting Sitting         Visual Acuity      ED Medications  Medications   lactated ringers bolus 1,000 mL (1,000 mL Intravenous New Bag 1/4/22 0841)   metoclopramide (REGLAN) injection 10 mg (10 mg Intravenous Given 1/4/22 0834)   diphenhydrAMINE (BENADRYL) injection 25 mg (25 mg Intravenous Given 1/4/22 0837)       Diagnostic Studies  Results Reviewed     Procedure Component Value Units Date/Time    Comprehensive metabolic panel [[id number]]  (Abnormal) Collected: 01/04/22 0841    Lab Status: Final result Specimen: Blood from Arm, Right Updated: 01/04/22 0904     Sodium 139 mmol/L      Potassium 3 4 mmol/L      Chloride 104 mmol/L      CO2 21 mmol/L      ANION GAP 14 mmol/L      BUN 4 mg/dL      Creatinine 0 44 mg/dL      Glucose 101 mg/dL      Calcium 9 1 mg/dL      AST 18 U/L      ALT 28 U/L      Alkaline Phosphatase 45 U/L      Total Protein 7 6 g/dL      Albumin 3 8 g/dL      Total Bilirubin 0 29 mg/dL      eGFR 141 ml/min/1 73sq m     Narrative:      [place] guidelines for Chronic Kidney Disease (CKD):     Stage 1 with normal or high GFR (GFR > 90 mL/min/1 73 square meters)    Stage 2 Mild CKD (GFR = 60-89 mL/min/1 73 square meters)    Stage 3A Moderate CKD (GFR = 45-59 mL/min/1 73 square meters)    Stage 3B Moderate CKD (GFR = 30-44 mL/min/1 73 square meters)    Stage 4 Severe CKD (GFR = 15-29 mL/min/1 73 square meters)    Stage 5 End Stage CKD (GFR <15 mL/min/1 73 square meters)  Note: GFR calculation is accurate only with a steady state creatinine    UA w Reflex to Microscopic w Reflex to Culture [[id number]] Collected: 01/04/22 0843    Lab Status: Final result Specimen: Urine, Clean Catch Updated: 01/04/22 0849     Color, UA Light Yellow     Clarity, UA Slightly Cloudy     Specific Gravity, UA <=1 005     pH, UA 6 0     Leukocytes, UA Negative     Nitrite, UA Negative     Protein, UA Negative mg/dl      Glucose, UA Negative mg/dl      Ketones, UA Negative mg/dl      Urobilinogen, UA 0 2 E U /dl      Bilirubin, UA Negative     Blood, UA Negative     URINE COMMENT --    Urine culture [[id number]] Collected: 01/04/22 0843    Lab Status: In process Specimen: Urine, Clean Catch Updated: 01/04/22 0849    CBC and differential [[id number]]  (Abnormal) Collected: 01/04/22 0841    Lab Status: Final result Specimen: Blood from Arm, Right Updated: 01/04/22 0848     WBC 5 92 Thousand/uL      RBC 4 07 Million/uL      Hemoglobin 11 7 g/dL      Hematocrit 33 6 %      MCV 83 fL      MCH 28 7 pg      MCHC 34 8 g/dL      RDW 15 6 %      MPV 10 4 fL      Platelets 952 Thousands/uL      nRBC 0 /100 WBCs      Neutrophils Relative 80 %      Immat GRANS % 1 %      Lymphocytes Relative 9 %      Monocytes Relative 10 %      Eosinophils Relative 0 %      Basophils Relative 0 %      Neutrophils Absolute 4 75 Thousands/µL      Immature Grans Absolute 0 08 Thousand/uL      Lymphocytes Absolute 0 51 Thousands/µL      Monocytes Absolute 0 57 Thousand/µL      Eosinophils Absolute 0 00 Thousand/µL      Basophils Absolute 0 01 Thousands/µL                  No orders to display              Procedures  Procedures         ED Course                               SBIRT 22yo+      Most Recent Value   SBIRT (22 yo +)    In order to provide better care to our patients, we are screening all of our patients for alcohol and drug use  Would it be okay to ask you these screening questions? Yes Filed at: 01/04/2022 0720   Initial Alcohol Screen: US AUDIT-C     1   How often do you have a drink containing alcohol? 0 Filed at: 01/04/2022 0720   2  How many drinks containing alcohol do you have on a typical day you are drinking? 0 Filed at: 01/04/2022 0720   3a  Male UNDER 65: How often do you have five or more drinks on one occasion? 0 Filed at: 01/04/2022 0720   3b  FEMALE Any Age, or MALE 65+: How often do you have 4 or more drinks on one occassion? 0 Filed at: 01/04/2022 0720   Audit-C Score 0 Filed at: 01/04/2022 3270   ARACELI: How many times in the past year have you    Used an illegal drug or used a prescription medication for non-medical reasons? Never Filed at: 01/04/2022 0720                    MDM    Disposition  Final diagnoses:   Headache   Migraine   Pregnancy     Time reflects when diagnosis was documented in both MDM as applicable and the Disposition within this note     Time User Action Codes Description Comment    1/4/2022  9:34 AM Senthil PINK Add [R51 9] Headache     1/4/2022  9:35 AM Senthil PINK Add [G43 909] Migraine     1/4/2022  9:35 AM Skyler Santana Add [Z34 90] Pregnancy       ED Disposition     ED Disposition Condition Date/Time Comment    Discharge Stable Tu Jan 4, 2022  9:34 AM Jairo Mayorga discharge to home/self care  Follow-up Information     Follow up With Specialties Details Why [address] [facility], [address] [address] [phone]            Patient's Medications   Discharge Prescriptions    ONDANSETRON (ZOFRAN ODT) 4 MG DISINTEGRATING TABLET    Take 1 tablet (4 mg total) by mouth every 6 (six) hours as needed for nausea or vomiting       Start Date: 1/4/2022  End Date: --       Order Dose: 4 mg       Quantity: 20 tablet    Refills: 0       No discharge procedures on file      PDMP Review     None          ED Provider  Electronically Signed by           Garrett Castro DO  01/04/22 8540

## 2022-01-05 LAB — BACTERIA UR CULT: NORMAL

## 2022-01-06 ENCOUNTER — TELEPHONE (OUTPATIENT)
Dept: FAMILY MEDICINE CLINIC | Facility: CLINIC | Age: 27
End: 2022-01-06

## 2022-01-10 DIAGNOSIS — R51.9 NONINTRACTABLE HEADACHE, UNSPECIFIED CHRONICITY PATTERN, UNSPECIFIED HEADACHE TYPE: ICD-10-CM

## 2022-01-10 DIAGNOSIS — R43.2 LOSS OF TASTE: Primary | ICD-10-CM

## 2022-01-10 DIAGNOSIS — R43.0 LOSS OF SMELL: ICD-10-CM

## 2022-01-10 PROCEDURE — U0005 INFEC AGEN DETEC AMPLI PROBE: HCPCS | Performed by: STUDENT IN AN ORGANIZED HEALTH CARE EDUCATION/TRAINING PROGRAM

## 2022-01-10 PROCEDURE — U0003 INFECTIOUS AGENT DETECTION BY NUCLEIC ACID (DNA OR RNA); SEVERE ACUTE RESPIRATORY SYNDROME CORONAVIRUS 2 (SARS-COV-2) (CORONAVIRUS DISEASE [COVID-19]), AMPLIFIED PROBE TECHNIQUE, MAKING USE OF HIGH THROUGHPUT TECHNOLOGIES AS DESCRIBED BY CMS-2020-01-R: HCPCS | Performed by: STUDENT IN AN ORGANIZED HEALTH CARE EDUCATION/TRAINING PROGRAM

## 2022-01-12 ENCOUNTER — TELEPHONE (OUTPATIENT)
Dept: FAMILY MEDICINE CLINIC | Facility: CLINIC | Age: 27
End: 2022-01-12

## 2022-01-12 NOTE — TELEPHONE ENCOUNTER
----- Message from Marilyn George MD sent at 1/12/2022 11:15 AM EST -----  Call with abnormal results

## 2022-01-31 ENCOUNTER — OFFICE VISIT (OUTPATIENT)
Dept: DENTISTRY | Facility: CLINIC | Age: 27
End: 2022-01-31
Payer: COMMERCIAL

## 2022-01-31 VITALS — SYSTOLIC BLOOD PRESSURE: 122 MMHG | DIASTOLIC BLOOD PRESSURE: 74 MMHG | HEART RATE: 76 BPM

## 2022-01-31 DIAGNOSIS — K02.9 TOOTH DECAY: Primary | ICD-10-CM

## 2022-01-31 PROCEDURE — D2391 RESIN-BASED COMPOSITE - 1 SURFACE, POSTERIOR: HCPCS | Performed by: STUDENT IN AN ORGANIZED HEALTH CARE EDUCATION/TRAINING PROGRAM

## 2022-01-31 PROCEDURE — D2330 RESIN-BASED COMPOSITE - 1 SURFACE, ANTERIOR: HCPCS | Performed by: STUDENT IN AN ORGANIZED HEALTH CARE EDUCATION/TRAINING PROGRAM

## 2022-01-31 NOTE — PROGRESS NOTES
Composite Filling    Katarina Duran presents for composite filling  PMH reviewed, no changes  Discussed with patient need for RCT if pulp exposure occurs or in future if pulp is inflamed  Pt understands and consents  Applied topical benzocaine, administered 1 carps 4% articaine 1:100k epi via Infiltraiton    Prepped tooth #3, 5, 6 with 556 carbide on high speed  Caries removed with round carbide on slow speed  Isolation with cotton rolls and dri-angles    Etch with 37% H2PO4, rinse, dry  Applied Adhese with 20 second scrub once, gentle air dry and light cured for 10s  Restored with Tetric bulk marychuy and flowable resin shade A2 and light cured  Refined with finishing burs, polished with enhance point  Verified occlusion and contacts  Pt left satisfied        NV: Resin

## 2022-02-16 ENCOUNTER — OFFICE VISIT (OUTPATIENT)
Dept: DENTISTRY | Facility: CLINIC | Age: 27
End: 2022-02-16
Payer: COMMERCIAL

## 2022-02-16 DIAGNOSIS — K03.6 ACCRETIONS ON TEETH: Primary | ICD-10-CM

## 2022-02-16 PROCEDURE — D1330 ORAL HYGIENE INSTRUCTIONS: HCPCS | Performed by: DENTAL HYGIENIST

## 2022-02-16 PROCEDURE — D0190 SCREENING OF A PATIENT: HCPCS | Performed by: DENTAL HYGIENIST

## 2022-02-16 PROCEDURE — D1310 NUTRITIONAL COUNSELING FOR CONTROL OF DENTAL DISEASE: HCPCS | Performed by: DENTAL HYGIENIST

## 2022-02-16 PROCEDURE — D1110 PROPHYLAXIS - ADULT: HCPCS | Performed by: DENTAL HYGIENIST

## 2022-02-16 NOTE — PROGRESS NOTES
Adult Matt  Chief Complaint   Patient presents with    Routine Oral Cleaning    Patient states she is not taking any medications except prenatal vitamins  She quit smoking  Patient states she misses smoking and still craves it  Discussed 1st, 2nd, and 3rd hand smoking with soon being a mom  Method Used:  · Prophy Method Used: Hand Scaling  · Polished  · Flossed  Fluoride    Intra/Extra Oral Cancer Screening:  · Within normal limits    Oral Hygiene:  · Fair    Plaque:  · Light    Calculus:  · Light  · Lower anteriors    Bleeding:  · Generalized  · Light    Stain:  · Light    Periodontal Charting:   · Full probing  1-4mm discussed probes and 3mm probes on direct linguals of 24-25 from tongue rind    Rec plastic ball but patient shates she can't use plastic bc she breaks them because of her habit of chewing her ball  Stressed that the patient be conscious of oral habits and stop once she realizes she is doing it  Periodontal Classification:  · Generalized  · Mild  · Periodontal Disease    Oral Hygiene Instruction:    Nadine Coronado over daily routine and c-shaped flossing  No orders of the defined types were placed in this encounter         Next Visit:  6 Month prophy- patient is due in July   Dentist visit-resins

## 2022-05-03 ENCOUNTER — OFFICE VISIT (OUTPATIENT)
Dept: FAMILY MEDICINE CLINIC | Facility: CLINIC | Age: 27
End: 2022-05-03
Payer: COMMERCIAL

## 2022-05-03 VITALS
OXYGEN SATURATION: 98 % | SYSTOLIC BLOOD PRESSURE: 128 MMHG | DIASTOLIC BLOOD PRESSURE: 92 MMHG | RESPIRATION RATE: 18 BRPM | HEART RATE: 82 BPM | HEIGHT: 63 IN | TEMPERATURE: 98.1 F | WEIGHT: 202.4 LBS | BODY MASS INDEX: 35.86 KG/M2

## 2022-05-03 DIAGNOSIS — O26.899 CARPAL TUNNEL SYNDROME DURING PREGNANCY: Primary | ICD-10-CM

## 2022-05-03 DIAGNOSIS — G56.00 CARPAL TUNNEL SYNDROME DURING PREGNANCY: Primary | ICD-10-CM

## 2022-05-03 PROCEDURE — T1015 CLINIC SERVICE: HCPCS | Performed by: FAMILY MEDICINE

## 2022-05-03 PROCEDURE — 99213 OFFICE O/P EST LOW 20 MIN: CPT | Performed by: FAMILY MEDICINE

## 2022-05-03 RX ORDER — B-COMPLEX WITH VITAMIN C
1 TABLET ORAL DAILY
COMMUNITY
Start: 2022-04-01

## 2022-05-03 NOTE — PATIENT INSTRUCTIONS
Carpal Tunnel Syndrome   AMBULATORY CARE:   Carpal tunnel syndrome (CTS)  is a condition that causes pressure to build in the carpal tunnel  The carpal tunnel is a small area between bones and tissues in your wrist  Swelling in this area puts pressure on the median nerve  The median nerve controls muscles and feeling in the hand  Common signs and symptoms:   · Dull, sharp, or shooting pain in your hand    · Numbness, tingling, or a burning feeling in your thumb, first finger, and middle finger    · Arm pain that may extend to your shoulder    · Weakness in your hand    · Swelling in your hand    · Not being able to control how your hand moves, or you drop objects    Seek care immediately if:   · You suddenly lose feeling in your hand or fingers and you cannot move them  · Your hand suddenly changes color  Contact your healthcare provider if:   · Your symptoms get worse  · Your hand and fingers are so weak that you cannot grab, squeeze, or lift items  · You have questions or concerns about your condition or care  Treatment  may not be needed  If your symptoms continue or are severe, you may need any of the following:  · Transcutaneous electric nerve stimulation  uses mild electrical impulses to help decrease your wrist pain  · Surgery  called decompression may be used to take pressure off of the median nerve in your wrist     Manage your symptoms:   · Apply ice to your wrist   Ice helps decrease swelling and pain in your wrist  Ice may also help prevent tissue damage  Use an ice pack, or put crushed ice in a plastic bag  Cover the ice pack with a towel  Place it on your wrist for 15 to 20 minutes every hour, or as directed  · Rest your hands  Let your hands rest for a short time between repetitive motions, such as typing  If you feel pain, stop what you are doing and gently massage your wrist and hand  · Get physical and occupational therapy, if directed    Physical therapists will show you ways to exercise and strengthen your wrist  Occupational therapists will show you safe ways to use your wrist while you do your usual activities  · Use a wrist splint as directed  A splint will keep your wrist straight or in a slightly bent position  A wrist splint decreases pressure on the median nerve by letting your wrist rest  You may need to wear the splint for up to 8 weeks  You may need to wear it at night  Follow up with your doctor as directed:  Write down your questions so you remember to ask them during your visits  © Copyright [employer] 2022 Information is for End User's use only and may not be sold, redistributed or otherwise used for commercial purposes  All illustrations and images included in CareNotes® are the copyrighted property of [employer] M , Inc  or [facility]   The above information is an  only  It is not intended as medical advice for individual conditions or treatments  Talk to your doctor, nurse or pharmacist before following any medical regimen to see if it is safe and effective for you  Carpal Tunnel Syndrome Exercises   AMBULATORY CARE:   What you need to know about carpal tunnel syndrome (CTS) exercises:  CTS exercises can help relieve pain and increase your range of motion  Your healthcare provider or physical therapist can tell you how often to do the exercises  CTS exercises:   · Finger extensions:  Hold your fingers and thumb close together  Keep them straight  Put a rubber band around the outside of your fingers and thumb  Spread your fingers apart  Then slowly bring them together without letting the rubber band fall off  Repeat 40 times  · Wrist flexor stretch:  Hold your arm out straight  Grasp your fingers with your other hand  Slowly bend the fingers back (palm facing away) until you feel a stretch in your wrist  Hold for 10 seconds  Repeat 5 times  · Wrist extensor stretch:  Hold your arm out straight   Grasp your fingers with your other hand  Slowly bend the fingers and hand down (palm facing you) until you feel a stretch on top of your hand  Hold for 10 seconds  Repeat 5 times  · Wrist curls without weights:  Sit in a chair with your forearm resting on your thigh or a table  With your palm facing down, flex your wrist up 3 inches and slowly lower it  Turn your forearm over and repeat with your palm facing up  Do each exercise 20 times  · Shrugs:  Stand with your arms by your side  Lift your shoulders up to your ears and hold for 1 second  Then pull your shoulders back, pinching your shoulder blades together  Hold for 1 second  Then relax your shoulders  Repeat 20 times  © Copyright [employer] 2022 Information is for End User's use only and may not be sold, redistributed or otherwise used for commercial purposes  All illustrations and images included in CareNotes® are the copyrighted property of [employer]  or [employer]   The above information is an  only  It is not intended as medical advice for individual conditions or treatments  Talk to your doctor, nurse or pharmacist before following any medical regimen to see if it is safe and effective for you

## 2022-05-16 ENCOUNTER — OFFICE VISIT (OUTPATIENT)
Dept: DENTISTRY | Facility: CLINIC | Age: 27
End: 2022-05-16
Payer: COMMERCIAL

## 2022-05-16 VITALS — HEART RATE: 86 BPM | DIASTOLIC BLOOD PRESSURE: 81 MMHG | SYSTOLIC BLOOD PRESSURE: 142 MMHG

## 2022-05-16 DIAGNOSIS — K02.9 TOOTH DECAY: Primary | ICD-10-CM

## 2022-05-16 PROCEDURE — D2391 RESIN-BASED COMPOSITE - 1 SURFACE, POSTERIOR: HCPCS | Performed by: STUDENT IN AN ORGANIZED HEALTH CARE EDUCATION/TRAINING PROGRAM

## 2022-05-16 PROCEDURE — D2392 RESIN-BASED COMPOSITE - 2 SURFACES, POSTERIOR: HCPCS | Performed by: STUDENT IN AN ORGANIZED HEALTH CARE EDUCATION/TRAINING PROGRAM

## 2022-05-16 NOTE — PROGRESS NOTES
Composite Filling    Cj Lang presents for composite filling  PMH reviewed, no changes  Discussed with patient need for RCT if pulp exposure occurs or in future if pulp is inflamed  Pt understands and consents  Applied topical benzocaine, administered 1 carps 2% lido 1:100k epi via IANB and long buccal and 1/2 carps 4% articaine 1:100k epi via infiltration    Prepped tooth #18, 19, 21 with 330 carbide on high speed  Caries removed with round carbide on slow speed  Isolation with cotton rolls and dri-angles    Etch with 37% H2PO4, rinse, dry  Applied Adhese with 20 second scrub once, gentle air dry and light cured for 10s  Restored with Tetric bulk marychuy shade A2 and light cured  Refined with finishing burs, polished with enhance point  Verified occlusion and contacts  Pt left satisfied  Pt wants to wait till after she gives birth to do #30         NV: Resins

## 2022-05-23 ENCOUNTER — OFFICE VISIT (OUTPATIENT)
Dept: DENTISTRY | Facility: CLINIC | Age: 27
End: 2022-05-23
Payer: COMMERCIAL

## 2022-05-23 VITALS — SYSTOLIC BLOOD PRESSURE: 122 MMHG | DIASTOLIC BLOOD PRESSURE: 81 MMHG | HEART RATE: 80 BPM

## 2022-05-23 DIAGNOSIS — K02.9 TOOTH DECAY: Primary | ICD-10-CM

## 2022-05-23 PROCEDURE — D2330 RESIN-BASED COMPOSITE - 1 SURFACE, ANTERIOR: HCPCS | Performed by: STUDENT IN AN ORGANIZED HEALTH CARE EDUCATION/TRAINING PROGRAM

## 2022-05-23 NOTE — PROGRESS NOTES
Composite Filling    Nieves Glee presents for composite filling  PMH reviewed, no changes  Discussed with patient need for RCT if pulp exposure occurs or in future if pulp is inflamed  Pt understands and consents  Applied topical benzocaine, administered 1 carps 4% articaine 1:100k epi via infiltration    Prepped tooth #26, 27 with 330 carbide on high speed  Caries removed with round carbide on slow speed  Isolation with cotton rolls and dri-angles    Etch with 37% H2PO4, rinse, dry  Applied Adhese with 20 second scrub once, gentle air dry and light cured for 10s  Restored with beautifil flowable resin shade A2 and light cured  Refined with finishing burs, polished with enhance point  Verified occlusion and contacts  Pt left satisfied      NV: Resins

## 2022-06-06 ENCOUNTER — OFFICE VISIT (OUTPATIENT)
Dept: DENTISTRY | Facility: CLINIC | Age: 27
End: 2022-06-06
Payer: COMMERCIAL

## 2022-06-06 VITALS — HEART RATE: 70 BPM | DIASTOLIC BLOOD PRESSURE: 86 MMHG | SYSTOLIC BLOOD PRESSURE: 129 MMHG

## 2022-06-06 DIAGNOSIS — K02.9 TOOTH DECAY: Primary | ICD-10-CM

## 2022-06-06 PROCEDURE — D1351 SEALANT - PER TOOTH: HCPCS | Performed by: STUDENT IN AN ORGANIZED HEALTH CARE EDUCATION/TRAINING PROGRAM

## 2022-06-06 PROCEDURE — D2391 RESIN-BASED COMPOSITE - 1 SURFACE, POSTERIOR: HCPCS | Performed by: STUDENT IN AN ORGANIZED HEALTH CARE EDUCATION/TRAINING PROGRAM

## 2022-06-06 NOTE — PROGRESS NOTES
Composite Filling    Donetta Epley presents for composite filling  PMH reviewed, no changes  Discussed with patient need for RCT if pulp exposure occurs or in future if pulp is inflamed  Pt understands and consents  Applied topical benzocaine, administered 1 carps 4% articaine 1:100k epi via  PSA and MSA  Prepped tooth #12, 14, 15 with 556 carbide on high speed  Caries removed with round carbide on slow speed  Isolation with cotton rolls and dri-angles    Etch with 37% H2PO4, rinse, dry  Applied Adhese with 20 second scrub once, gentle air dry and light cured for 10s  Restored with Tetric bulk marychuy shade A2 and beautifil flowable resin shade A2 and light cured  Refined with finishing burs, polished with enhance point  Verified occlusion and contacts  Pt left satisfied  NV: Resin on LR

## 2022-07-19 ENCOUNTER — APPOINTMENT (EMERGENCY)
Dept: ULTRASOUND IMAGING | Facility: HOSPITAL | Age: 27
End: 2022-07-19
Payer: COMMERCIAL

## 2022-07-19 ENCOUNTER — HOSPITAL ENCOUNTER (EMERGENCY)
Facility: HOSPITAL | Age: 27
Discharge: HOME | End: 2022-07-19
Attending: EMERGENCY MEDICINE
Payer: COMMERCIAL

## 2022-07-19 VITALS
DIASTOLIC BLOOD PRESSURE: 98 MMHG | RESPIRATION RATE: 18 BRPM | OXYGEN SATURATION: 99 % | HEART RATE: 81 BPM | SYSTOLIC BLOOD PRESSURE: 147 MMHG | TEMPERATURE: 98.3 F

## 2022-07-19 DIAGNOSIS — N93.9 VAGINAL BLEEDING: Primary | ICD-10-CM

## 2022-07-19 LAB
ABO GROUP BLD: NORMAL
ABO GROUP BLD: NORMAL
ANION GAP SERPL CALCULATED.3IONS-SCNC: 11 MMOL/L (ref 4–13)
APTT PPP: 28 SECONDS (ref 23–37)
B-HCG SERPL-ACNC: 70.3 MIU/ML
BASOPHILS # BLD AUTO: 0.03 THOUSANDS/ΜL (ref 0–0.1)
BASOPHILS NFR BLD AUTO: 0 % (ref 0–1)
BLD GP AB SCN SERPL QL: NEGATIVE
BUN SERPL-MCNC: 9 MG/DL (ref 5–25)
CALCIUM SERPL-MCNC: 9.3 MG/DL (ref 8.3–10.1)
CHLORIDE SERPL-SCNC: 103 MMOL/L (ref 96–108)
CO2 SERPL-SCNC: 25 MMOL/L (ref 21–32)
CREAT SERPL-MCNC: 0.69 MG/DL (ref 0.6–1.3)
EOSINOPHIL # BLD AUTO: 0.21 THOUSAND/ΜL (ref 0–0.61)
EOSINOPHIL NFR BLD AUTO: 2 % (ref 0–6)
ERYTHROCYTE [DISTWIDTH] IN BLOOD BY AUTOMATED COUNT: 12.5 % (ref 11.6–15.1)
GFR SERPL CREATININE-BSD FRML MDRD: 121 ML/MIN/1.73SQ M
GLUCOSE SERPL-MCNC: 100 MG/DL (ref 65–140)
HCT VFR BLD AUTO: 37.4 % (ref 34.8–46.1)
HGB BLD-MCNC: 12.6 G/DL (ref 11.5–15.4)
IMM GRANULOCYTES # BLD AUTO: 0.12 THOUSAND/UL (ref 0–0.2)
IMM GRANULOCYTES NFR BLD AUTO: 1 % (ref 0–2)
INR PPP: 0.91 (ref 0.84–1.19)
LYMPHOCYTES # BLD AUTO: 1.92 THOUSANDS/ΜL (ref 0.6–4.47)
LYMPHOCYTES NFR BLD AUTO: 21 % (ref 14–44)
MCH RBC QN AUTO: 30.1 PG (ref 26.8–34.3)
MCHC RBC AUTO-ENTMCNC: 33.7 G/DL (ref 31.4–37.4)
MCV RBC AUTO: 90 FL (ref 82–98)
MONOCYTES # BLD AUTO: 0.55 THOUSAND/ΜL (ref 0.17–1.22)
MONOCYTES NFR BLD AUTO: 6 % (ref 4–12)
NEUTROPHILS # BLD AUTO: 6.17 THOUSANDS/ΜL (ref 1.85–7.62)
NEUTS SEG NFR BLD AUTO: 70 % (ref 43–75)
NRBC BLD AUTO-RTO: 0 /100 WBCS
PLATELET # BLD AUTO: 264 THOUSANDS/UL (ref 149–390)
PMV BLD AUTO: 10.5 FL (ref 8.9–12.7)
POTASSIUM SERPL-SCNC: 3.4 MMOL/L (ref 3.5–5.3)
PROTHROMBIN TIME: 12.4 SECONDS (ref 11.6–14.5)
RBC # BLD AUTO: 4.18 MILLION/UL (ref 3.81–5.12)
RH BLD: NEGATIVE
RH BLD: NEGATIVE
SODIUM SERPL-SCNC: 139 MMOL/L (ref 135–147)
SPECIMEN EXPIRATION DATE: NORMAL
WBC # BLD AUTO: 9 THOUSAND/UL (ref 4.31–10.16)

## 2022-07-19 PROCEDURE — 86850 RBC ANTIBODY SCREEN: CPT | Performed by: EMERGENCY MEDICINE

## 2022-07-19 PROCEDURE — 76830 TRANSVAGINAL US NON-OB: CPT

## 2022-07-19 PROCEDURE — 85610 PROTHROMBIN TIME: CPT | Performed by: EMERGENCY MEDICINE

## 2022-07-19 PROCEDURE — 76856 US EXAM PELVIC COMPLETE: CPT

## 2022-07-19 PROCEDURE — 85730 THROMBOPLASTIN TIME PARTIAL: CPT | Performed by: EMERGENCY MEDICINE

## 2022-07-19 PROCEDURE — 84702 CHORIONIC GONADOTROPIN TEST: CPT | Performed by: EMERGENCY MEDICINE

## 2022-07-19 PROCEDURE — 86901 BLOOD TYPING SEROLOGIC RH(D): CPT | Performed by: EMERGENCY MEDICINE

## 2022-07-19 PROCEDURE — 36415 COLL VENOUS BLD VENIPUNCTURE: CPT | Performed by: EMERGENCY MEDICINE

## 2022-07-19 PROCEDURE — 96372 THER/PROPH/DIAG INJ SC/IM: CPT

## 2022-07-19 PROCEDURE — 99284 EMERGENCY DEPT VISIT MOD MDM: CPT

## 2022-07-19 PROCEDURE — 80048 BASIC METABOLIC PNL TOTAL CA: CPT | Performed by: EMERGENCY MEDICINE

## 2022-07-19 PROCEDURE — 99284 EMERGENCY DEPT VISIT MOD MDM: CPT | Performed by: EMERGENCY MEDICINE

## 2022-07-19 PROCEDURE — 85025 COMPLETE CBC W/AUTO DIFF WBC: CPT | Performed by: EMERGENCY MEDICINE

## 2022-07-19 PROCEDURE — 86900 BLOOD TYPING SEROLOGIC ABO: CPT | Performed by: EMERGENCY MEDICINE

## 2022-07-19 RX ORDER — METHYLERGONOVINE MALEATE 0.2 MG/ML
0.2 INJECTION INTRAVENOUS ONCE
Status: COMPLETED | OUTPATIENT
Start: 2022-07-19 | End: 2022-07-19

## 2022-07-19 RX ORDER — KETOROLAC TROMETHAMINE 30 MG/ML
15 INJECTION, SOLUTION INTRAMUSCULAR; INTRAVENOUS ONCE
Status: DISCONTINUED | OUTPATIENT
Start: 2022-07-19 | End: 2022-07-19

## 2022-07-19 RX ORDER — CARBOPROST TROMETHAMINE 250 UG/ML
250 INJECTION, SOLUTION INTRAMUSCULAR ONCE
Status: DISCONTINUED | OUTPATIENT
Start: 2022-07-19 | End: 2022-07-19

## 2022-07-19 RX ADMIN — METHYLERGONOVINE MALEATE 0.2 MG: 0.2 INJECTION INTRAVENOUS at 18:42

## 2022-07-19 NOTE — ED PROVIDER NOTES
Final Diagnosis:  1  Vaginal bleeding        Chief Complaint   Patient presents with    Vaginal Bleeding     Patient is 4days postpartum and reports heavy vaginal bleeding with clots the size of an egg starting early afternoon  HPI  Patient presents for ongoing vaginal bleeding  Patient states that earlier this afternoon she started to pass some clots and has significant vaginal bleeding whenever she is upright  Soaking through multiple pads as well as her clothes  She then contacted her OBGYN instructed her to go into the emergency room should she have continued heavy bleeding  Patient states that she gave birth approximately 4 days ago  She states that her delivery was normal and uncomplicated  Later she states that she did have a tear and had had 2 sutures placed  She denies any abdominal pain  No fevers but subjective chills  She says that she feels run down, she attributes this to recently giving birth  No foul smelling discharge  No lightheadedness, shortness of breath  No history of coagulopathy  No blood during his  Unless otherwise specified:  - No language barrier    - History obtained from patient  - There are no limitations to the history obtained  - Previous charting was reviewed    PMH:   has a past medical history of Depression, Insomnia, Psychiatric disorder, UTI (urinary tract infection), and Vertigo  PSH:   has a past surgical history that includes [place] tooth extraction  ROS:  Review of Systems   -   - 13 point ROS was performed and all are normal unless stated in the history above  - Nursing note reviewed  Vitals reviewed  - Orders placed by myself and/or advanced practitioner / resident  PE:   Vitals:    07/19/22 1541 07/19/22 1730 07/19/22 1745 07/19/22 1800   BP: 147/98      BP Location: Left arm      Pulse: 91 77 81 82   Resp: 18 18 18 18   Temp: 98 3 °F (36 8 °C)      TempSrc: Temporal      SpO2: 99% 98% 98% 98%     Vitals reviewed by me  Patient appears anxious but nondistressed  Abdomen is large, nontender to palpation diffusely  Unless otherwise specified above:    General: VS reviewed  Appears in NAD    Head: Normocephalic, atraumatic  Eyes: EOM-I  No exudate  ENT: Atraumatic external nose and ears  No malocclusion  No stridor  No drooling  Neck: No JVD  CV: No pallor noted  Lungs:   No tachypnea  No respiratory distress    Abdomen:  Soft, non-tender, non-distended    MSK:   FROM spontaneously  No obvious deformity    Skin: Dry, intact  No obvious rash  Neuro: Awake, alert, GCS15, CN II-XII grossly intact  Speaking in full sentences  Motor grossly intact  Psychiatric/Behavioral: Appropriate mood and affect   Exam: deferred    Physical Exam     Procedures   A:  - Nursing note reviewed  ED Course as of 07/19/22 1837 Tue Jul 19, 2022   1629 Pelvic exam attempted  Patient with red blood and clots coming out of vagina  She did not tolerate further speculum exam and requested assist up  There was no malodorous discharge  No evidence of endometritis  [address] [facility] [place] out to patients OBGYN group     US pelvis complete w transvaginal   Final Result       Enlarged uterus with heterogeneous endometrial echotexture likely postpartum  1 cm structure in the lower uterine segment could represent blood clot  Short-term follow-up ultrasound recommended                           Workstation performed: [id number]           Orders Placed This Encounter   Procedures    US pelvis complete w transvaginal    CBC and differential    Basic metabolic panel    Protime-INR    APTT    hCG, quantitative    Insert peripheral IV    Type and screen    ABORh Recheck - Contact Blood Bank Prior to Collection     Labs Reviewed   BASIC METABOLIC PANEL - Abnormal       Result Value Ref Range Status    Sodium 139  135 - 147 mmol/L Final    Potassium 3 4 (*) 3 5 - 5 3 mmol/L Final    Chloride 103  96 - 108 mmol/L Final    CO2 25 21 - 32 mmol/L Final    ANION GAP 11  4 - 13 mmol/L Final    BUN 9  5 - 25 mg/dL Final    Creatinine 0 69  0 60 - 1 30 mg/dL Final    Comment: Standardized to IDMS reference method    Glucose 100  65 - 140 mg/dL Final    Comment: If the patient is fasting, the ADA then defines impaired fasting glucose as > 100 mg/dL and diabetes as > or equal to 123 mg/dL  Specimen collection should occur prior to Sulfasalazine administration due to the potential for falsely depressed results  Specimen collection should occur prior to Sulfapyridine administration due to the potential for falsely elevated results      Calcium 9 3  8 3 - 10 1 mg/dL Final    eGFR 121  ml/min/1 73sq m Final    Narrative:     [place] guidelines for Chronic Kidney Disease (CKD):     Stage 1 with normal or high GFR (GFR > 90 mL/min/1 73 square meters)    Stage 2 Mild CKD (GFR = 60-89 mL/min/1 73 square meters)    Stage 3A Moderate CKD (GFR = 45-59 mL/min/1 73 square meters)    Stage 3B Moderate CKD (GFR = 30-44 mL/min/1 73 square meters)    Stage 4 Severe CKD (GFR = 15-29 mL/min/1 73 square meters)    Stage 5 End Stage CKD (GFR <15 mL/min/1 73 square meters)  Note: GFR calculation is accurate only with a steady state creatinine   HCG, QUANTITATIVE - Abnormal    HCG, Quant 70 3 (*) <=6 mIU/mL Final    Narrative:      Expected Ranges:     Approximate               Approximate HCG  Gestation age          Concentration ( mIU/mL)  _____________          ______________________   [place]                      HCG values  0 2-1                       5-50  1-2                           2-3                         100-5000  3-4                         500-34261  4-5                         1000-24668  5-6                         [id number]  6-8                         [id number]  8-12                        [id number]     PROTIME-INR - Normal    Protime 12 4  11 6 - 14 5 seconds Final    INR 0 91  0 84 - 1 19 Final   APTT - Normal    PTT 28  23 - 37 seconds Final    Comment: Therapeutic Heparin Range =  60-90 seconds   CBC AND DIFFERENTIAL    WBC 9 00  4 31 - 10 16 Thousand/uL Final    RBC 4 18  3 81 - 5 12 Million/uL Final    Hemoglobin 12 6  11 5 - 15 4 g/dL Final    Hematocrit 37 4  34 8 - 46 1 % Final    MCV 90  82 - 98 fL Final    MCH 30 1  26 8 - 34 3 pg Final    MCHC 33 7  31 4 - 37 4 g/dL Final    RDW 12 5  11 6 - 15 1 % Final    MPV 10 5  8 9 - 12 7 fL Final    Platelets 841  570 - 390 Thousands/uL Final    nRBC 0  /100 WBCs Final    Neutrophils Relative 70  43 - 75 % Final    Immat GRANS % 1  0 - 2 % Final    Lymphocytes Relative 21  14 - 44 % Final    Monocytes Relative 6  4 - 12 % Final    Eosinophils Relative 2  0 - 6 % Final    Basophils Relative 0  0 - 1 % Final    Neutrophils Absolute 6 17  1 85 - 7 62 Thousands/µL Final    Immature Grans Absolute 0 12  0 00 - 0 20 Thousand/uL Final    Lymphocytes Absolute 1 92  0 60 - 4 47 Thousands/µL Final    Monocytes Absolute 0 55  0 17 - 1 22 Thousand/µL Final    Eosinophils Absolute 0 21  0 00 - 0 61 Thousand/µL Final    Basophils Absolute 0 03  0 00 - 0 10 Thousands/µL Final   TYPE AND SCREEN    ABO Grouping O   Final    Rh Factor Negative   Final    Antibody Screen Negative   Final    Specimen Expiration Date [id number]   Final   ABORH RECHECK    ABO Grouping O   Final    Rh Factor Negative   Final         Final Diagnosis:  1  Vaginal bleeding        P:  - patient presents with postpartum hemorrhage after approximately 4 days  Patient gave birth at another hospital group  Will evaluate with CBC, type and screen, beta HCG, ultrasound  -pelvic exam attempted  Patient complained of significant pain at the vaginal opening  Requested to stop exam   -I reviewed the findings with the patient's on-call OBGYN provider  They suggested a dose of Methergine and close follow-up with OBGYN  I reviewed this with the patient  Vital signs are stable  No increase in bleeding here  Hemoglobin stable as well  No obvious retained products of conception  Follow-up with OBGYN as well as return precautions reviewed  Medications   methylergonovine (METHERGINE) injection 0 2 mg (has no administration in time range)     Time reflects when diagnosis was documented in both MDM as applicable and the Disposition within this note     Time User Action Codes Description Comment    7/19/2022  6:07 PM Remigio Galan Add [N93 9] Vaginal bleeding       ED Disposition     ED Disposition   Discharge    Condition   Stable    Date/Time   Tue Jul 19, 2022  6:07 PM    Comment   Duane Parents discharge to home/self care  Follow-up Information     Follow up With Specialties Details Why Contact Info    Your OBGYN  Schedule an appointment as soon as possible for a visit           Patient's Medications   Discharge Prescriptions    No medications on file     No discharge procedures on file  Prior to Admission Medications   Prescriptions Last Dose Informant Patient Reported? Taking? Prenatal Multivit-Min-Fe-FA (Prenatal, w/Iron & FA,) 27-0 8 MG TABS   Yes No   Sig: Take 1 tablet by mouth daily      Facility-Administered Medications: None       Portions of the record may have been created with voice recognition software  Occasional wrong word or "sound a like" substitutions may have occurred due to the inherent limitations of voice recognition software  Read the chart carefully and recognize, using context, where substitutions have occurred      Electronically signed by:  MD Allie Valdez MD  07/19/22 5392

## 2022-08-24 ENCOUNTER — OFFICE VISIT (OUTPATIENT)
Dept: DENTISTRY | Facility: CLINIC | Age: 27
End: 2022-08-24
Payer: COMMERCIAL

## 2022-08-24 VITALS — DIASTOLIC BLOOD PRESSURE: 93 MMHG | SYSTOLIC BLOOD PRESSURE: 139 MMHG | HEART RATE: 60 BPM

## 2022-08-24 DIAGNOSIS — K03.6 ACCRETIONS ON TEETH: Primary | ICD-10-CM

## 2022-08-24 PROCEDURE — D0190 SCREENING OF A PATIENT: HCPCS | Performed by: DENTAL HYGIENIST

## 2022-08-24 PROCEDURE — D0274 BITEWINGS - 4 RADIOGRAPHIC IMAGES: HCPCS | Performed by: DENTAL HYGIENIST

## 2022-08-24 PROCEDURE — D1330 ORAL HYGIENE INSTRUCTIONS: HCPCS | Performed by: DENTAL HYGIENIST

## 2022-08-24 PROCEDURE — D1110 PROPHYLAXIS - ADULT: HCPCS | Performed by: DENTAL HYGIENIST

## 2022-08-24 NOTE — PROGRESS NOTES
Adult Prophy  Chief Complaint   Patient presents with    Routine Oral Cleaning    Patient  Is having some pain to chewing on UR side  Method Used:  · Prophy Method Used: Hand Scaling  · Polished  · Flossed  No napkin around neck- pt tucks it in her shirt    Radiographs Taken in Dexis: (Taken to assess periodontal health)  · Bitewings x4    Intra/Extra Oral Cancer Screening:  · Within normal limits    Oral Hygiene:  · Fair    Plaque:  · Generalized  · Light    Calculus:  · Generalized  · Light  · Lower anteriors    Bleeding:  · Light    Stain:  · Light    Periodontal Charting: Showed patient sub calc specks and bone height  · Spot probing    Periodontal Classification:  · Generalized  · Mild  · Periodontal Disease    Nutritional Counseling:  · Discussed pH and the role it plays in decay    Oral Hygiene Instruction:    Went over daily routine and c-shaped flossing  Discussed Oral systemic link and role of plaque in gum disease  No orders of the defined types were placed in this encounter         Next Visit:  6 Month [facility]  Dentist visit- resin, periodic eval, and check UR side

## 2022-10-04 ENCOUNTER — OFFICE VISIT (OUTPATIENT)
Dept: FAMILY MEDICINE CLINIC | Facility: CLINIC | Age: 27
End: 2022-10-04
Payer: COMMERCIAL

## 2022-10-04 VITALS
BODY MASS INDEX: 33.57 KG/M2 | OXYGEN SATURATION: 98 % | RESPIRATION RATE: 18 BRPM | SYSTOLIC BLOOD PRESSURE: 122 MMHG | DIASTOLIC BLOOD PRESSURE: 80 MMHG | WEIGHT: 182.4 LBS | TEMPERATURE: 97.6 F | HEIGHT: 62 IN | HEART RATE: 68 BPM

## 2022-10-04 DIAGNOSIS — Z23 NEED FOR HPV VACCINATION: Primary | ICD-10-CM

## 2022-10-04 DIAGNOSIS — M25.532 LEFT WRIST PAIN: ICD-10-CM

## 2022-10-04 DIAGNOSIS — M65.4 TENOSYNOVITIS, DE QUERVAIN: ICD-10-CM

## 2022-10-04 PROCEDURE — T1015 CLINIC SERVICE: HCPCS | Performed by: FAMILY MEDICINE

## 2022-10-04 PROCEDURE — 99213 OFFICE O/P EST LOW 20 MIN: CPT | Performed by: FAMILY MEDICINE

## 2022-10-04 RX ORDER — CLINDAMYCIN HYDROCHLORIDE 300 MG/1
CAPSULE ORAL
COMMUNITY
Start: 2022-08-26 | End: 2022-10-19

## 2022-10-04 RX ORDER — CEPHALEXIN 250 MG/1
250 CAPSULE ORAL 2 TIMES DAILY
COMMUNITY
Start: 2022-08-29 | End: 2022-10-19

## 2022-10-04 RX ORDER — IBUPROFEN 800 MG/1
TABLET ORAL
COMMUNITY
Start: 2022-07-17

## 2022-10-04 NOTE — PROGRESS NOTES
Assessment/Plan:     Problem List Items Addressed This Visit    None     Visit Diagnoses     Need for HPV vaccination    -  Primary    Left wrist pain        Relevant Orders    Ambulatory Referral to Physical Therapy    Tenosynovitis, de Quervain        Relevant Orders    Cock Up Wrist Splint            Patient likely with symptoms of the COVID and Center tendinitis  Patient can continue over-the-counter Tylenol as needed  Additionally patient will be given a splint for symptoms especially when patient's sleep  Patient also given a referral for physical therapy  If symptoms worsen patient to contact the office  Subjective:      Patient ID: James De La Rosa is a 24 y o  female presents with symptoms of left wrist pain  Patient indicates that symptoms have been present for about 2 months but has been progressively worsening  Patient denies any recent traumas or falls in a particular region  Patient denies numbness or tingling sensation around the left wrist region  Of note patient just had a child approximately 2 5 months ago  Patient was complaining of similar symptoms in her right wrist   Symptoms have dissipated in the right wrist which improving secondary to splinting  Patient indicates that pain can be 10/10 at times  PICC pain is worsened with movement and picking up her baby  Symptoms are not worse at night it is random in nature  No other associated symptoms  Patient denies fevers, chills, night sweats, chest pain shortness of breath  The following portions of the patient's history were reviewed and updated as appropriate: allergies, current medications, past family history, past medical history, past social history, past surgical history and problem list     Review of Systems   Constitutional: Negative for chills and fever  HENT: Negative for ear pain and sore throat  Eyes: Negative for pain and visual disturbance  Respiratory: Negative for cough and shortness of breath  Cardiovascular: Negative for chest pain and palpitations  Gastrointestinal: Negative for abdominal pain and vomiting  Genitourinary: Negative for dysuria and hematuria  Musculoskeletal: Positive for arthralgias  Negative for back pain  Symptoms evident with phalen test and tiners test   And tenderness to touch and the wrist area around the tendon sheet by the thumb  Skin: Negative for color change and rash  Neurological: Negative for seizures and syncope  All other systems reviewed and are negative  Objective:    /80   Pulse 68   Temp 97 6 °F (36 4 °C)   Resp 18   Ht 5' 2" (1 575 m)   Wt 82 7 kg (182 lb 6 4 oz)   SpO2 98%   BMI 33 36 kg/m²        Physical Exam  Constitutional:       Appearance: Normal appearance  She is normal weight  HENT:      Head: Normocephalic and atraumatic  Right Ear: Tympanic membrane normal       Left Ear: Tympanic membrane normal    Cardiovascular:      Rate and Rhythm: Normal rate and regular rhythm  Pulses: Normal pulses  Heart sounds: Normal heart sounds  Pulmonary:      Effort: Pulmonary effort is normal       Breath sounds: Normal breath sounds  Abdominal:      General: Abdomen is flat  Bowel sounds are normal  There is no distension  Palpations: Abdomen is soft  There is no mass  Tenderness: There is no abdominal tenderness  Hernia: No hernia is present  Musculoskeletal:         General: Tenderness and deformity present  No swelling  Normal range of motion  Cervical back: Normal range of motion  Comments: There is tenderness to touch around the left wrist around the left thumb around the tendon sheet  Tinel's test positive  Regional range of motion otherwise intact  No evidence of swelling or erythema or drainage  Skin:     General: Skin is warm  Capillary Refill: Capillary refill takes less than 2 seconds  Coloration: Skin is not jaundiced or pale        Findings: No bruising or erythema  Neurological:      General: No focal deficit present  Mental Status: She is alert and oriented to person, place, and time     Psychiatric:         Mood and Affect: Mood normal          Behavior: Behavior normal

## 2022-10-10 ENCOUNTER — EVALUATION (OUTPATIENT)
Dept: PHYSICAL THERAPY | Facility: CLINIC | Age: 27
End: 2022-10-10
Payer: COMMERCIAL

## 2022-10-10 DIAGNOSIS — M25.532 LEFT WRIST PAIN: Primary | ICD-10-CM

## 2022-10-10 PROCEDURE — 97140 MANUAL THERAPY 1/> REGIONS: CPT | Performed by: PHYSICAL THERAPIST

## 2022-10-10 PROCEDURE — 97161 PT EVAL LOW COMPLEX 20 MIN: CPT | Performed by: PHYSICAL THERAPIST

## 2022-10-10 PROCEDURE — 97112 NEUROMUSCULAR REEDUCATION: CPT | Performed by: PHYSICAL THERAPIST

## 2022-10-10 PROCEDURE — 97535 SELF CARE MNGMENT TRAINING: CPT | Performed by: PHYSICAL THERAPIST

## 2022-10-10 PROCEDURE — 97035 APP MDLTY 1+ULTRASOUND EA 15: CPT | Performed by: PHYSICAL THERAPIST

## 2022-10-10 NOTE — PROGRESS NOTES
PT Evaluation     Today's date: 10/10/2022  Patient name: Rossi Cat  : 1995  MRN: [id number]  Referring provider: Radha Holland DO  Dx:   Encounter Diagnosis     ICD-10-CM    1  Left wrist pain  M25 532                   Assessment  Assessment details: Pt is a 25 YO female presenting to PT with pain, decreased AROM, strength and tolerance to activity  Pt would benefit from skilled intervention to address these issues and maximize overall function  Occupation- not working  Dominant- right; Involved- left      Goals  ST  Decrease pain to 0-2/10 in 4 weeks            2  Decrease swelling left hand and wrist            3  Increase AROM to [facility] in 6-8 weeks            4   Provide orthotic for protection  LT  Increase functional motion and strength for independence with ADL and self care by DC            2  Ability to RTW and recreational activity by DC    Plan  Patient would benefit from: skilled physical therapy  Planned modality interventions: cryotherapy, ultrasound and thermotherapy: hydrocollator packs  Planned therapy interventions: activity modification, neuromuscular re-education, strengthening, stretching, therapeutic activities, therapeutic exercise and home exercise program  Frequency: 2x week  Duration in weeks: 4  Treatment plan discussed with: patient        Subjective Evaluation    History of Present Illness  Mechanism of injury: Several months of pain in left>right wrist   No recent trauma noted      Pain  Current pain rating: 3  At best pain ratin  At worst pain ratin  Location: dorsal radial wrist  Quality: dull ache  Relieving factors: change in position    Hand dominance: right    Treatments  Current treatment: physical therapy  Patient Goals  Patient goals for therapy: decreased edema, increased motion, decreased pain, increased strength, independence with ADLs/IADLs and return to sport/leisure activities          Objective     General Comments:      Wrist/Hand Comments  AROM left wrist E/F- wrist/digits- [facility]  Strength-  II- L/R- 55/70; 3 RITA- 12/16; Key- 8/12- all painful radial wrist  Palpation- tender 1st dorsal compartment/dorsal wrist    Crepitation notes with active thumb E/F; (+) Finkelstein  Pt provided comfort cool for support/restriction             Precautions: avoid provocative positions and activity      Manuals 10/10            STM 15                         Comfort cool                                     Neuro Re-Ed             HP/pulsed biph 15                                      Ther Ex                                                                                                                                                                                                   Modalities             US 15            CP 15

## 2022-10-13 ENCOUNTER — OFFICE VISIT (OUTPATIENT)
Dept: PHYSICAL THERAPY | Facility: CLINIC | Age: 27
End: 2022-10-13
Payer: COMMERCIAL

## 2022-10-13 DIAGNOSIS — M25.532 LEFT WRIST PAIN: Primary | ICD-10-CM

## 2022-10-13 PROCEDURE — 97112 NEUROMUSCULAR REEDUCATION: CPT

## 2022-10-13 PROCEDURE — 97035 APP MDLTY 1+ULTRASOUND EA 15: CPT

## 2022-10-13 PROCEDURE — 97140 MANUAL THERAPY 1/> REGIONS: CPT

## 2022-10-13 PROCEDURE — 97110 THERAPEUTIC EXERCISES: CPT

## 2022-10-13 NOTE — PROGRESS NOTES
Daily Note     Today's date: 10/13/2022  Patient name: Fortino Tomas  : 1995  MRN: [id number]  Referring provider: Debbie Hawk DO  Dx:   Encounter Diagnosis     ICD-10-CM    1  Left wrist pain  M25 532                   Subjective: Pt  Reports she noticed a slight improvement since initial visit but then it got sore again"      Objective: See treatment diary below    Wrist/Hand Comments  YANETH left wrist E/F-    Assessment: Tolerated treatment well today  Continue with current program while monitoring symptoms    Plan: Progress treatment as tolerated         Precautions: avoid provocative positions and activity      Manuals 10/10 10/13           STM 15 15                        Comfort cool                                     Neuro Re-Ed             HP/pulsed biph 15 15                                     Ther Ex                                                                                                                                                                                                   Modalities             US 15 15           CP 15 15

## 2022-10-17 ENCOUNTER — OFFICE VISIT (OUTPATIENT)
Dept: PHYSICAL THERAPY | Facility: CLINIC | Age: 27
End: 2022-10-17
Payer: COMMERCIAL

## 2022-10-17 DIAGNOSIS — M25.532 LEFT WRIST PAIN: Primary | ICD-10-CM

## 2022-10-17 PROCEDURE — 97112 NEUROMUSCULAR REEDUCATION: CPT | Performed by: PHYSICAL THERAPIST

## 2022-10-17 PROCEDURE — 97035 APP MDLTY 1+ULTRASOUND EA 15: CPT | Performed by: PHYSICAL THERAPIST

## 2022-10-17 PROCEDURE — 97140 MANUAL THERAPY 1/> REGIONS: CPT | Performed by: PHYSICAL THERAPIST

## 2022-10-17 NOTE — PROGRESS NOTES
Daily Note     Today's date: 10/17/2022  Patient name: Marie Husain  : 1995  MRN: [id number]  Referring provider: Jovany Joyner DO  Dx:   Encounter Diagnosis     ICD-10-CM    1  Left wrist pain  M25 532                   Subjective: pt noting reduction in soreness in her radial thumb, but has episodes of sharp pain and popping of the tendon if not wearing her splint      Objective: See treatment diary below    AROM left wrist E/F- wrist/digits- [facility]  Strength-  II- L/R- 55/70; 3 RITA- ; Key- - all painful radial wrist  Palpation- tender 1st dorsal compartment/dorsal wrist    Crepitation notes with active thumb E/F; (+) Finkelstein  Pt provided comfort cool for support/restriction  Instructed in activity modification    Assessment: Tolerated treatment well  Patient would benefit from continued PT      Plan: Continue per plan of care        Precautions: avoid provocative positions and activity      Manuals 10/10 10/13 10/17          STM 15 15 15                       Comfort cool            Med+                         Neuro Re-Ed             HP/pulsed biph 15 15 15                                    Ther Ex                                                                                                                                                                                                   Modalities             US 15 15 15          CP 15 15 15

## 2022-10-19 ENCOUNTER — TELEMEDICINE (OUTPATIENT)
Dept: FAMILY MEDICINE CLINIC | Facility: CLINIC | Age: 27
End: 2022-10-19
Payer: COMMERCIAL

## 2022-10-19 DIAGNOSIS — R10.9 ABDOMINAL PAIN, UNSPECIFIED ABDOMINAL LOCATION: Primary | ICD-10-CM

## 2022-10-20 ENCOUNTER — APPOINTMENT (OUTPATIENT)
Dept: PHYSICAL THERAPY | Facility: CLINIC | Age: 27
End: 2022-10-20

## 2022-10-21 ENCOUNTER — APPOINTMENT (OUTPATIENT)
Dept: PHYSICAL THERAPY | Facility: CLINIC | Age: 27
End: 2022-10-21

## 2022-10-24 ENCOUNTER — APPOINTMENT (OUTPATIENT)
Dept: PHYSICAL THERAPY | Facility: CLINIC | Age: 27
End: 2022-10-24

## 2022-10-24 ENCOUNTER — OFFICE VISIT (OUTPATIENT)
Dept: URGENT CARE | Facility: MEDICAL CENTER | Age: 27
End: 2022-10-24
Payer: COMMERCIAL

## 2022-10-24 VITALS
OXYGEN SATURATION: 98 % | HEART RATE: 84 BPM | TEMPERATURE: 98 F | BODY MASS INDEX: 33.34 KG/M2 | WEIGHT: 181.2 LBS | SYSTOLIC BLOOD PRESSURE: 130 MMHG | HEIGHT: 62 IN | RESPIRATION RATE: 20 BRPM | DIASTOLIC BLOOD PRESSURE: 90 MMHG

## 2022-10-24 DIAGNOSIS — J06.9 ACUTE RESPIRATORY DISEASE: Primary | ICD-10-CM

## 2022-10-24 PROCEDURE — 99213 OFFICE O/P EST LOW 20 MIN: CPT | Performed by: PHYSICIAN ASSISTANT

## 2022-10-24 NOTE — PATIENT INSTRUCTIONS
Vitamin D3 2000 IU daily  Vitamin C 1000mg twice per day  Multivitamin daily  Fluids and rest  Over the counter cold medication as needed  Follow up with PCP in 3-5 days  Proceed to  ER if symptoms worsen

## 2022-10-24 NOTE — PROGRESS NOTES
[address]        NAME: Triny Carlisle is a 24 y o  female  : 1995    MRN: [id number]  DATE: 2022  TIME: 7:46 PM    Assessment and Plan   Acute respiratory disease [J06 9]  1  Acute respiratory disease           Patient Instructions     Vitamin D3 2000 IU daily  Vitamin C 1000mg twice per day  Multivitamin daily  Fluids and rest  Over the counter cold medication as needed  Follow up with PCP in 3-5 days  Proceed to  ER if symptoms worsen  Chief Complaint     Chief Complaint   Patient presents with   • Cold Like Symptoms     Exposed to RSV, headache, itchy watery eyes x 5 days  Robitussin DM         History of Present Illness       Negative home COVID test      URI   This is a new problem  Episode onset: Wednesday  Maximum temperature: subjective  Associated symptoms include coughing, headaches, nausea and a sore throat  Pertinent negatives include no abdominal pain, chest pain, congestion, diarrhea, ear pain, rash, rhinorrhea, sinus pain or vomiting  Treatments tried: ibuprofen; delsym  Review of Systems   Review of Systems   Constitutional: Positive for fever (subjective)  Negative for chills  HENT: Positive for sore throat  Negative for congestion, ear discharge, ear pain, hearing loss, postnasal drip, rhinorrhea, sinus pressure, sinus pain, tinnitus and trouble swallowing  Respiratory: Positive for cough  Negative for shortness of breath  Cardiovascular: Negative for chest pain and palpitations  Gastrointestinal: Positive for nausea  Negative for abdominal pain, constipation, diarrhea and vomiting  Musculoskeletal: Negative for myalgias  Skin: Negative for rash  Neurological: Positive for headaches           Current Medications       Current Outpatient Medications:   •  Prenatal Multivit-Min-Fe-FA (Prenatal, w/Iron & FA,) 27-0 8 MG TABS, Take 1 tablet by mouth daily, Disp: , Rfl:   •  ibuprofen (MOTRIN) 800 mg tablet, TAKE 1 TABLET BY MOUTH EVERY 6 HOURS AS NEEDED FOR MILD PAIN (PAIN SCORE 1-3) (Patient not taking: Reported on 10/24/2022), Disp: , Rfl:     Current Allergies     Allergies as of 10/24/2022   • (No Known Allergies)            The following portions of the patient's history were reviewed and updated as appropriate: allergies, current medications, past family history, past medical history, past social history, past surgical history and problem list      Past Medical History:   Diagnosis Date   • Depression    • Insomnia    • Psychiatric disorder    • UTI (urinary tract infection)    • Vertigo        Past Surgical History:   Procedure Laterality Date   • WISDOM TOOTH EXTRACTION         Family History   Problem Relation Age of Onset   • Heart disease Father    • Cancer Paternal Grandmother         breast         Medications have been verified  Objective   /90   Pulse 84   Temp 98 °F (36 7 °C)   Resp 20   Ht 5' 2" (1 575 m)   Wt 82 2 kg (181 lb 3 2 oz)   SpO2 98%   BMI 33 14 kg/m²   No LMP recorded  Physical Exam     Physical Exam  Vitals reviewed  Constitutional:       General: She is not in acute distress  Appearance: She is well-developed  She is not diaphoretic  HENT:      Head: Normocephalic and atraumatic  Right Ear: Tympanic membrane and external ear normal       Left Ear: Tympanic membrane and external ear normal       Nose: Nose normal       Mouth/Throat:      Pharynx: No oropharyngeal exudate or posterior oropharyngeal erythema  Eyes:      General:         Right eye: No discharge  Left eye: No discharge  Cardiovascular:      Rate and Rhythm: Normal rate and regular rhythm  Heart sounds: Normal heart sounds  No murmur heard  No friction rub  No gallop  Pulmonary:      Effort: Pulmonary effort is normal  No respiratory distress  Breath sounds: Normal breath sounds  No wheezing or rales  Chest:      Chest wall: No tenderness  Lymphadenopathy:      Cervical: No cervical adenopathy  Skin:     General: Skin is warm  Findings: No rash  Neurological:      Mental Status: She is alert  Psychiatric:         Behavior: Behavior normal          Thought Content:  Thought content normal          Judgment: Judgment normal

## 2022-10-24 NOTE — LETTER
October 24, 2022     Patient: Ade Lou   YOB: 1995   Date of Visit: 10/24/2022       To Whom it May Concern:    Ade Lou was seen in my clinic on 10/24/2022  She may return provided symptoms have improved and has remained fever free for 24 hours without fever reducing medications  If you have any questions or concerns, please don't hesitate to call           Sincerely,          Noe Marte PA-C        CC: No Recipients

## 2022-10-24 NOTE — LETTER
October 24, 2022     Patient: Anjum Gonzales   YOB: 1995   Date of Visit: 10/24/2022       To Whom It May Concern: It is my medical opinion that Anjum Gonzales may return provided symptoms have improved and has remained fever free for 24 hours without fever reducing medications  If you have any questions or concerns, please don't hesitate to call           Sincerely,        Abe Hussein PA-C    CC: No Recipients

## 2022-10-27 ENCOUNTER — APPOINTMENT (OUTPATIENT)
Dept: PHYSICAL THERAPY | Facility: CLINIC | Age: 27
End: 2022-10-27

## 2022-10-31 ENCOUNTER — OFFICE VISIT (OUTPATIENT)
Dept: PHYSICAL THERAPY | Facility: CLINIC | Age: 27
End: 2022-10-31

## 2022-10-31 DIAGNOSIS — M25.532 LEFT WRIST PAIN: Primary | ICD-10-CM

## 2022-10-31 NOTE — PROGRESS NOTES
Daily Note     Today's date: 10/31/2022  Patient name: Carol Haynes  : 1995  MRN: [id number]  Referring provider: Wander Spencer DO  Dx:   Encounter Diagnosis     ICD-10-CM    1  Left wrist pain  M25 532                   Subjective: pt reports her thumb is continuously improving  Objective: See treatment diary below    Objective: See treatment diary below    AROM left wrist E/F- wrist/digits- [place]/[facility]  Strength-  II- L/R- /70; 3 RITA- ; Key- - all painful radial wrist  Palpation- tender 1st dorsal compartment/dorsal wrist    Crepitation notes with active thumb E/F; (+) Finkelstein  Pt provided comfort cool for support/restriction  Instructed in activity modification    Assessment: Tolerated treatment well today  Pt responding well  Pt using her hand more at home  Plan: Progress treatment as tolerated         Precautions: avoid provocative positions and activity      Manuals 10/10 10/13 10/17 10/31         STM 15 15 15 15                      Comfort cool            Med+                         Neuro Re-Ed             HP/pulsed biph 15 15 15 15                                   Ther Ex                                                                                                                                                                                                   Modalities             US 15 15 15 15         CP 15 15 15 15

## 2022-11-03 ENCOUNTER — OFFICE VISIT (OUTPATIENT)
Dept: PHYSICAL THERAPY | Facility: CLINIC | Age: 27
End: 2022-11-03

## 2022-11-03 DIAGNOSIS — M25.532 LEFT WRIST PAIN: Primary | ICD-10-CM

## 2022-11-07 ENCOUNTER — APPOINTMENT (OUTPATIENT)
Dept: PHYSICAL THERAPY | Facility: CLINIC | Age: 27
End: 2022-11-07

## 2022-11-10 ENCOUNTER — OFFICE VISIT (OUTPATIENT)
Dept: PHYSICAL THERAPY | Facility: CLINIC | Age: 27
End: 2022-11-10

## 2022-11-10 DIAGNOSIS — M25.532 LEFT WRIST PAIN: Primary | ICD-10-CM

## 2022-11-10 NOTE — PROGRESS NOTES
PT Re-Evaluation     Today's date: 11/10/2022  Patient name: Guy May  : 1995  MRN: [id number]  Referring provider: Reyna Obrien DO  Dx:   Encounter Diagnosis     ICD-10-CM    1  Left wrist pain  M25 532                   Assessment  Assessment details: Pt is a 25 YO female presenting to PT with pain, decreased AROM, strength and tolerance to activity  Pt would benefit from skilled intervention to address these issues and maximize overall function  Occupation- not working  Dominant- right; Involved- left  Pt feels she is able to complete more activity at home, self care  She still has episodes of high pain levels along the 1st dorsal compartment, but overall much less that previous  Goals  ST  Decrease pain to 0-2/10 in 4 weeks            2  Decrease swelling left hand and wrist            3  Increase AROM to [facility] in 6-8 weeks            4   Provide orthotic for protection  LT  Increase functional motion and strength for independence with ADL and self care by DC            2  Ability to RTW and recreational activity by DC    Plan  Patient would benefit from: skilled physical therapy  Planned modality interventions: cryotherapy, ultrasound and thermotherapy: hydrocollator packs  Planned therapy interventions: activity modification, neuromuscular re-education, strengthening, stretching, therapeutic activities, therapeutic exercise and home exercise program  Frequency: 2x week  Duration in weeks: 4  Treatment plan discussed with: patient        Subjective Evaluation    History of Present Illness  Mechanism of injury: Several months of pain in left>right wrist   No recent trauma noted      Pain  Current pain ratin  At best pain ratin  At worst pain ratin  Location: dorsal radial wrist  Quality: dull ache  Relieving factors: change in position    Hand dominance: right    Treatments  Current treatment: physical therapy  Patient Goals  Patient goals for therapy: decreased edema, increased motion, decreased pain, increased strength, independence with ADLs/IADLs and return to sport/leisure activities          Objective     General Comments:      Wrist/Hand Comments  AROM left wrist E/F- wrist/digits- [facility]  Strength-  II- L/R- 55/70; 3 RITA- 12/16; Key- 8/12- all 0/10m pain  Palpation- tender 1st dorsal compartment/dorsal wrist; pt notes mild pain with activity     Crepitation notes with active thumb E/F; (+) Finkelstein  Pt provided comfort cool for support/restriction             Precautions: avoid provocative positions and activity      Manuals 10/10 10/13 10/17 10/31 11/3  11/10           STM 15 15 15 15 15  15                                   Comfort cool            Med+                                             Neuro Re-Ed                       HP/pulsed biph 15 15 15 15 15  15                                                           Ther Ex                                                                                                                                                                                                                                                                                                                                                                       Modalities                       US 15 15 15 15 15  15           CP 15 15 15 15 15  15

## 2022-11-14 ENCOUNTER — OFFICE VISIT (OUTPATIENT)
Dept: PHYSICAL THERAPY | Facility: CLINIC | Age: 27
End: 2022-11-14

## 2022-11-14 DIAGNOSIS — M25.532 LEFT WRIST PAIN: Primary | ICD-10-CM

## 2022-11-14 NOTE — PROGRESS NOTES
Daily Note     Today's date: 2022  Patient name: Halley Bowser  : 1995  MRN: [id number]  Referring provider: Christina Grewal DO  Dx:   Encounter Diagnosis     ICD-10-CM    1  Left wrist pain  M25 532                   Subjective: Pt reports she had an instance of pain"twinge in dorsal wrist area"      Objective: See treatment diary below  Wrist/Hand Comments  AROM left wrist E/F- wrist/digits- [facility] [facility]  Strength-  II- L/R- 55/70; 3 RITA- ; Key- - all 0/10m pain  Palpation- tender 1st dorsal compartment/dorsal wrist; pt notes mild pain with activity  Crepitation notes with active thumb E/F; (+) Finkelstein  Pt provided comfort cool for support/restriction          Assessment: Tolerated treatment well today  Pt to start therapy 1x week to determine if pt can regulate at home  Plan: Progress treatment as tolerated         Precautions: avoid provocative positions and activity      Manuals 10/10 10/13 10/17 10/31 11/3  11/10  11/14         STM 15 15 15 15 15  15  15                                 Comfort cool            Med+                                             Neuro Re-Ed                       HP/pulsed biph 15 15 15 15 15  15  15                                                         Ther Ex                                                                                                                                                                                                                                                                                                                                                                       Modalities                       US 15 15 15 15 15  15  15         CP 15 15 15 15 15  15  15

## 2022-11-21 ENCOUNTER — APPOINTMENT (OUTPATIENT)
Dept: PHYSICAL THERAPY | Facility: CLINIC | Age: 27
End: 2022-11-21

## 2022-11-28 ENCOUNTER — OFFICE VISIT (OUTPATIENT)
Dept: PHYSICAL THERAPY | Facility: CLINIC | Age: 27
End: 2022-11-28

## 2022-11-28 DIAGNOSIS — M25.532 LEFT WRIST PAIN: Primary | ICD-10-CM

## 2022-12-01 ENCOUNTER — OFFICE VISIT (OUTPATIENT)
Dept: FAMILY MEDICINE CLINIC | Facility: CLINIC | Age: 27
End: 2022-12-01

## 2022-12-01 VITALS
SYSTOLIC BLOOD PRESSURE: 112 MMHG | BODY MASS INDEX: 33.13 KG/M2 | RESPIRATION RATE: 16 BRPM | TEMPERATURE: 98.1 F | HEART RATE: 83 BPM | DIASTOLIC BLOOD PRESSURE: 84 MMHG | HEIGHT: 62 IN | OXYGEN SATURATION: 99 % | WEIGHT: 180 LBS

## 2022-12-01 DIAGNOSIS — R42 DIZZINESS: Primary | ICD-10-CM

## 2022-12-01 NOTE — PROGRESS NOTES
Assessment/Plan:     Problem List Items Addressed This Visit    None  Visit Diagnoses     Dizziness    -  Primary    Relevant Orders    TSH, 3rd generation with Free T4 reflex    Comprehensive metabolic panel    CBC and differential            Dizziness could be positional in nature  Patient will be given a script to check for electrolyte derangements and thyroid including CBC, CMP in TSH  Patient also was talked to about vestibular physical therapy  Currently she is not interested in doing that  Will continue to monitor symptoms as needed  Subjective:      Patient ID: Kath Perkins is a 24 y o  female presents to office secondary to a week history of dizziness  Patient has no other associated symptoms including hearing loss, tinnitus, chest pain, shortness of breath, fevers, chills, arthralgias, nausea, numbness  Patient indicates that symptoms persist follow-up multiple times a day  Symptoms last for few seconds  She is currently breastfeeding  Symptoms of care while she is staying still, lying down or with movement  There is an irregular pattern associated with triggers  She does have a history of vertigo in the past but indicates that symptoms are not quite the same as to have vertigo like symptoms she was a child  She does also indicate the dizziness were much more severe initially but still there but not as significant  Dizziness  The current episode started in the past 7 days  The problem occurs 2 to 4 times per day  The problem has been unchanged  Associated symptoms include fatigue  Pertinent negatives include no abdominal pain, arthralgias, chest pain, chills, congestion, coughing, diaphoresis, fever, headaches, joint swelling, nausea, neck pain, numbness, sore throat, urinary symptoms, vertigo, vomiting or weakness  The symptoms are aggravated by exertion, standing, twisting, walking, stress, swallowing and bending  She has tried nothing for the symptoms  The treatment provided no relief  The following portions of the patient's history were reviewed and updated as appropriate: allergies, current medications, past family history, past medical history, past social history, past surgical history and problem list     Review of Systems   Constitutional: Positive for fatigue  Negative for chills, diaphoresis and fever  HENT: Negative for congestion and sore throat  Respiratory: Negative for cough  Cardiovascular: Negative for chest pain  Gastrointestinal: Negative for abdominal pain, nausea and vomiting  Musculoskeletal: Negative for arthralgias, joint swelling and neck pain  Neurological: Positive for dizziness  Negative for vertigo, weakness, numbness and headaches  Objective:    /84   Pulse 83   Temp 98 1 °F (36 7 °C)   Resp 16   Ht 5' 2" (1 575 m)   Wt 81 6 kg (180 lb)   SpO2 99%   BMI 32 92 kg/m²        Physical Exam  Constitutional:       Appearance: Normal appearance  She is normal weight  HENT:      Head: Normocephalic and atraumatic  Right Ear: Tympanic membrane normal       Left Ear: Tympanic membrane normal    Cardiovascular:      Rate and Rhythm: Normal rate and regular rhythm  Pulses: Normal pulses  Heart sounds: Normal heart sounds  Pulmonary:      Effort: Pulmonary effort is normal       Breath sounds: Normal breath sounds  Abdominal:      General: Abdomen is flat  Bowel sounds are normal  There is no distension  Palpations: Abdomen is soft  There is no mass  Tenderness: There is no abdominal tenderness  Hernia: No hernia is present  Musculoskeletal:         General: No swelling or tenderness  Normal range of motion  Cervical back: Normal range of motion  Skin:     General: Skin is warm  Capillary Refill: Capillary refill takes less than 2 seconds  Coloration: Skin is not jaundiced or pale  Findings: No bruising or erythema  Neurological:      General: No focal deficit present        Mental Status: She is alert and oriented to person, place, and time     Psychiatric:         Mood and Affect: Mood normal          Behavior: Behavior normal

## 2022-12-05 ENCOUNTER — OFFICE VISIT (OUTPATIENT)
Dept: PHYSICAL THERAPY | Facility: CLINIC | Age: 27
End: 2022-12-05

## 2022-12-05 DIAGNOSIS — M25.532 LEFT WRIST PAIN: Primary | ICD-10-CM

## 2022-12-05 NOTE — PROGRESS NOTES
Daily Note     Today's date: 2022  Patient name: Theresa Norton  : 1995  MRN: [id number]  Referring provider: Jez Chu DO  Dx:   Encounter Diagnosis     ICD-10-CM    1  Left wrist pain  M25 532                      Subjective: Pt reports continued improvement in her thumb  Objective: See treatment diary below  AROM left wrist E/F- wrist/digits- [place]/[facility]  Strength-  II- L/R- 38/50; 3 RITA- ; Key- - all 0/10m pain  Palpation- tender 1st dorsal compartment/dorsal wrist; pt notes mild pain with activity  Crepitation notes with active thumb E/F; (+) Finkelstein  Pt provided comfort cool for support/restriction      Assessment: Tolerated treatment well today  Continue with current program while monitoring symptoms  Initiated  and pinch activities with only fatigue as complaint  Plan: Progress treatment as tolerated         Precautions: avoid provocative positions and activity      Manuals 10/10 10/13 10/17 10/31 11/3  11/10  11/14  11/28  12/5     STM 15 15 15 15 15  15  15  15  15                             Comfort cool            Med+                                             Neuro Re-Ed                       HP/pulsed biph 15 15 15 15 15  15  15  15  15                                                     Ther Ex                        TP                   P 2'      TP 5 finger                 P 2/10      Jose Maneul                  20 2/10      [place]                  III 2/10      Flexbar                  R 2/10                                                                                                                                                                                                                             Modalities                       US 15 15 15 15 15  15  15  15       CP 15 15 15 15 15  15  15  15

## 2022-12-05 NOTE — PROGRESS NOTES
PT Re-Evaluation     Today's date: 2022  Patient name: Dimitrios Almeida  : 1995  MRN: [id number]  Referring provider: Les Rodriguez DO  Dx:   Encounter Diagnosis     ICD-10-CM    1  Left wrist pain  M25 532           Start Time: 1300  Stop Time: 1415  Total time in clinic (min): 75 minutes    Assessment  Assessment details: Pt is a 23 YO female presenting to PT with pain, decreased AROM, strength and tolerance to activity  Pt would benefit from skilled intervention to address these issues and maximize overall function  Occupation- not working  Dominant- right; Involved- left  Pt feels she is able to complete more activity at home, self care  She still has episodes of mild pain levels along the 1st dorsal compartment, but overall much less that previous  Goals  ST  Decrease pain to 0-2/10 in 4 weeks            2  Decrease swelling left hand and wrist            3  Increase AROM to [facility] in 6-8 weeks            4   Provide orthotic for protection  LT  Increase functional motion and strength for independence with ADL and self care by DC            2  Ability to RTW and recreational activity by DC    Plan  Patient would benefit from: skilled physical therapy  Planned modality interventions: cryotherapy, ultrasound and thermotherapy: hydrocollator packs  Planned therapy interventions: activity modification, neuromuscular re-education, strengthening, stretching, therapeutic activities, therapeutic exercise and home exercise program  Frequency: 2x week  Duration in weeks: 4  Treatment plan discussed with: patient        Subjective Evaluation    History of Present Illness  Mechanism of injury: Several months of pain in left>right wrist   No recent trauma noted      Pain  Current pain ratin  At best pain ratin  At worst pain rating: 3  Location: dorsal radial wrist  Quality: dull ache  Relieving factors: change in position    Hand dominance: right    Treatments  Current treatment: physical therapy  Patient Goals  Patient goals for therapy: decreased edema, increased motion, decreased pain, increased strength, independence with ADLs/IADLs and return to sport/leisure activities          Objective     General Comments:      Wrist/Hand Comments  AROM left wrist E/F- wrist/digits- [facility]  Strength-  II- L/R- 38/50; 3 RITA- 8/16; Key- 8/16- all 0/10 pain  Palpation- tender 1st dorsal compartment/dorsal wrist; pt notes mild pain with activity     Crepitation notes with active thumb E/F; (+) Finkelstein  Pt provided comfort cool for support/restriction             Precautions: avoid provocative positions and activity    Manuals 10/10 10/13 10/17 10/31 11/3  11/10  11/14  11/28  12/5     STM 15 15 15 15 15  15  15  15  15                             Comfort cool            Med+                                             Neuro Re-Ed                       HP/pulsed biph 15 15 15 15 15  15  15  15  15                                                     Ther Ex                        TP                   P 2'      TP 5 finger                 P 2/10      Jose Manuel                  20 2/10      [place]                  III 2/10      Flexbar                  R 2/10                                                                                                                                                                                                                             Modalities                       US 15 15 15 15 15  15  15  15  15     CP 15 15 15 15 15  15  15  15  dc

## 2022-12-12 ENCOUNTER — APPOINTMENT (OUTPATIENT)
Dept: PHYSICAL THERAPY | Facility: CLINIC | Age: 27
End: 2022-12-12

## 2022-12-13 ENCOUNTER — APPOINTMENT (OUTPATIENT)
Dept: PHYSICAL THERAPY | Facility: CLINIC | Age: 27
End: 2022-12-13

## 2022-12-15 ENCOUNTER — APPOINTMENT (OUTPATIENT)
Dept: PHYSICAL THERAPY | Facility: CLINIC | Age: 27
End: 2022-12-15

## 2022-12-19 ENCOUNTER — APPOINTMENT (OUTPATIENT)
Dept: PHYSICAL THERAPY | Facility: CLINIC | Age: 27
End: 2022-12-19

## 2022-12-20 ENCOUNTER — OFFICE VISIT (OUTPATIENT)
Dept: PHYSICAL THERAPY | Facility: CLINIC | Age: 27
End: 2022-12-20

## 2022-12-20 DIAGNOSIS — M25.532 LEFT WRIST PAIN: Primary | ICD-10-CM

## 2022-12-20 NOTE — PROGRESS NOTES
Daily Note     Today's date: 2022  Patient name: Nico Jacob  : 1995  MRN: [id number]  Referring provider: Bob Gutierrez DO  Dx:   Encounter Diagnosis     ICD-10-CM    1  Left wrist pain  M25 532                      Subjective: Pt reports improved symptoms in her thumb  Objective: See treatment diary below    Wrist/Hand Comments  AROM left wrist E/F- wrist/digits- [facility]  Strength-  II- L/R- /50; 3 RITA- ; Key- - all 0/10 pain  Palpation- tender 1st dorsal compartment/dorsal wrist; pt notes mild pain with activity  Crepitation notes with active thumb E/F; (+) Finkelstein  Pt provided comfort cool for support/restriction    Assessment: Tolerated treatment well today  Patient had no increase in pain t/o session  Will progress as able and to tolerance  Plan: Continue per plan of care        Precautions: avoid provocative positions and activity    Manuals 10/10 10/13 10/17 10/31 11/3  11/10  11/14  11/28  12/5  12/20   STM 15 15 15 15 15  15  15  15  15  15                           Comfort cool            Med+                                             Neuro Re-Ed                       HP/pulsed biph 15 15 15 15 15  15  15  15  15  15                                                   Ther Ex                        TP                   P 2' P 2'    TP 5 finger                 P 10  P 2' 10    Jose Manuel                  20 2/10  20 2/10    [place]                  III 2/10  III 2/10    Flexbar                  R 2/10  R 2/10                                                                                                                                                                                                                           Modalities                       US 15 15 15 15 15  15  15  15  15  15   CP 15 15 15 15 15  15  15  15  dc

## 2022-12-27 ENCOUNTER — APPOINTMENT (OUTPATIENT)
Dept: PHYSICAL THERAPY | Facility: CLINIC | Age: 27
End: 2022-12-27

## 2022-12-29 ENCOUNTER — OFFICE VISIT (OUTPATIENT)
Dept: PHYSICAL THERAPY | Facility: CLINIC | Age: 27
End: 2022-12-29

## 2022-12-29 DIAGNOSIS — M25.532 LEFT WRIST PAIN: Primary | ICD-10-CM

## 2022-12-29 NOTE — PROGRESS NOTES
Daily Note     Today's date: 2022  Patient name: Efren Wilson  : 1995  MRN: [id number]  Referring provider: Sultana Pope DO  Dx:   Encounter Diagnosis     ICD-10-CM    1  Left wrist pain  M25 532                      Subjective: pt doing better  She notes improved strength and mostly fatigue with use  Objective: See treatment diary below    AROM left wrist E/F- wrist/digits- [place]  Strength-  II- L/R- 38/50; 3 RITA- ; Key- - all 0/10 pain  Palpation- tender 1st dorsal compartment/dorsal wrist; pt notes mild pain with activity  Crepitation notes with active thumb E/F; (+) Finkelstein  Pt provided comfort cool for support/restriction    Assessment: Tolerated treatment well  Patient would benefit from continued PT      Plan: Continue per plan of care        Precautions: avoid provocative positions and activity    Manuals             STM 15 15 15 15 15  15  15  15  15  15                           Comfort cool                                                         Neuro Re-Ed                       HP/pulsed biph 15 15 15 15 15  15  15  15  15  15                                                   Ther Ex                        TP  P 2'                P 2' P 2'    TP 5 finger  P 2'               P 2/10  P 2' 2/10    Jose Manuel 35 2/10                20 2/10  20 2/10    [place]  L IV 2/10                III 2/10  III 2/10    Flexbar  R 20/20                R 2/10  R 2/10    DB E/F  4 2/10                            S/P 1 5 2/10                                                                                                                                                                                              Modalities                       US 15 15 15 15 15  15  15  15  15  15   CP 15 15 15 15 15  15  15  15  dc

## 2023-01-05 ENCOUNTER — APPOINTMENT (OUTPATIENT)
Dept: PHYSICAL THERAPY | Facility: CLINIC | Age: 28
End: 2023-01-05

## 2023-01-12 ENCOUNTER — APPOINTMENT (OUTPATIENT)
Dept: PHYSICAL THERAPY | Facility: CLINIC | Age: 28
End: 2023-01-12

## 2023-01-20 ENCOUNTER — APPOINTMENT (OUTPATIENT)
Dept: PHYSICAL THERAPY | Facility: CLINIC | Age: 28
End: 2023-01-20

## 2023-01-26 ENCOUNTER — OFFICE VISIT (OUTPATIENT)
Dept: PHYSICAL THERAPY | Facility: CLINIC | Age: 28
End: 2023-01-26

## 2023-01-26 DIAGNOSIS — M25.532 LEFT WRIST PAIN: Primary | ICD-10-CM

## 2023-01-26 NOTE — PROGRESS NOTES
PT Discharge    Today's date: 2023  Patient name: Parviz Charles  : 1995  MRN: [id number]  Referring provider: Michael Ritchie DO  Dx:   Encounter Diagnosis     ICD-10-CM    1  Left wrist pain  M25 532                      Assessment  Assessment details: Pt is a 23 YO female presenting to PT with pain, decreased AROM, strength and tolerance to activity  Pt would benefit from skilled intervention to address these issues and maximize overall function  Occupation- not working  Dominant- right; Involved- left  Pt feels she is able to complete more activity at home, self care  She still has had no episodes pain along the 1st dorsal compartment  Goals  ST  Decrease pain to 0-2/10 in 4 weeks            2  Decrease swelling left hand and wrist            3  Incre[facility] in 6-8 weeks            4   Provide orthotic for protection  LT  Increase functional motion and strength for independence with ADL and self care by DC            2  Ability to RTW and recreational activity by DC  Goals met    Plan  Patient would benefit from: skilled physical therapy  Planned modality interventions: cryotherapy, ultrasound and thermotherapy: hydrocollator packs  Planned therapy interventions: activity modification, neuromuscular re-education, strengthening, stretching, therapeutic activities, therapeutic exercise and home exercise program  Frequency: 2x week  Duration in weeks: 4  Treatment plan discussed with: patient        Subjective Evaluation    History of Present Illness  Mechanism of injury: Several months of pain in left>right wrist   No recent trauma noted      Pain  No pain reported  Current pain ratin  At best pain ratin  At worst pain ratin  Location: dorsal radial wrist  Quality: dull ache  Relieving factors: change in position    Hand dominance: right    Treatments  Current treatment: physical therapy  Patient Goals  Patient goals for therapy: decreased edema, increased motion, decreased pain, increased strength, independence with ADLs/IADLs and return to sport/leisure activities          Objective     General Comments:      Wrist/Hand Comments  AROM left wrist E/F- wrist/digits- [facility]  Strength-  II- L/R- 70/70; 3 RITA- 12/12; Key- 12/12- all 0/10 pain  Palpation- no pain noted  Pt provided comfort cool for support/restriction             Precautions: avoid provocative positions and activity    Manuals 12/29 1/26                   STM 15 15 15 15 15  15  15  15  15  15                           Comfort cool                                                          Neuro Re-Ed                       HP/pulsed biph 15 def 15 15 15  15  15  15  15  15                                                   Ther Ex                        TP  P 2'  P/T 2'              P 2' P 2'    TP 5 finger  P 2'  P/T 2/10             P 2/10  P 2' 2/10    Jose Manuel 35 2/10 35 2/10              20 2/10  20 2/10    R[place]  L IV 2/10  L IV  2/10              III 2/10  III 2/10    Flexbar  R 20/20  R 20/20              R 2/10  R 2/10    DB E/F  4 2/10  4 2/10                          S/P 1 5 2/10   1 5 2/10                                                                                                                                                                                           Modalities                       US 15 def 15 15 15  15  15  15  15  15   CP 15 def 15 15 15  15  15  15  dc

## 2023-05-09 ENCOUNTER — OFFICE VISIT (OUTPATIENT)
Dept: DENTISTRY | Facility: CLINIC | Age: 28
End: 2023-05-09

## 2023-05-09 VITALS — DIASTOLIC BLOOD PRESSURE: 78 MMHG | HEART RATE: 80 BPM | SYSTOLIC BLOOD PRESSURE: 123 MMHG

## 2023-05-09 DIAGNOSIS — K03.6 ACCRETIONS ON TEETH: Primary | ICD-10-CM

## 2023-05-09 NOTE — PROGRESS NOTES
Adult Prophy  Chief Complaint   Patient presents with   • Routine Oral Cleaning    No new dental concerns  Pt has general sensitivity and can not open wide due to jaw  Jaw was loud and clicking and popping today at her visit  She needs frequent breaks     Method Used:  · Prophy Method Used: Hand Scaling  · Polished  · Flossed    Radiographs Taken in Dexis: (Taken to assess periodontal health)  · None    Intra/Extra Oral Cancer Screening:  · Within normal limits    Oral Hygiene:  · Fair    Plaque:  · Light    Calculus:  · Light    Bleeding:  · Light    Stain:  · Light    Periodontal Charting: Showed patient sub calc specks and bone height  · Spot probing    Periodontal Classification:  · Generalized  · Mild  · Gingivitis    Oral Hygiene Instruction:    Oksana Primes over daily routine and c-shaped flossing  Discussed Oral systemic link and role of plaque in gum disease  No orders of the defined types were placed in this encounter  Next Visit:  6 Month prophy- bwx  Dentist visit- resin  Patient tucks napkin into shirt  Do not use Bib-Ez  Pt has general sensitivity and can not open wide due to jaw  Jaw was loud and clicking and popping today at her visit   She needs frequent breaks

## 2023-05-25 ENCOUNTER — NURSE TRIAGE (OUTPATIENT)
Dept: OTHER | Facility: OTHER | Age: 28
End: 2023-05-25

## 2023-05-25 NOTE — TELEPHONE ENCOUNTER
"  Reason for Disposition  • Health Information question, no triage required and triager able to answer question    Answer Assessment - Initial Assessment Questions  1  REASON FOR CALL or QUESTION: \"What is your reason for calling today? \" or \"How can I best help you? \" or \"What question do you have that I can help answer? \"      Questions about dulcolax and breastfeeding    Protocols used: INFORMATION ONLY CALL - NO TRIAGE-ADULT-AH    "

## 2023-05-25 NOTE — TELEPHONE ENCOUNTER
"Regarding: breast feeding medication question  ----- Message from Alexandria Us sent at 5/25/2023  6:17 PM EDT -----  \"I am currently breastfeeding I wanted to know if I can ise dulcolax laxative while I am breastfeeding\"    "

## 2023-05-25 NOTE — TELEPHONE ENCOUNTER
Patient is calling to see if she can take dulcolax while breastfeeding  Okay to take while breastfeeding       Carmina Zelaya

## 2023-06-25 NOTE — PROGRESS NOTES
Assessment/Plan:    No problem-specific Assessment & Plan notes found for this encounter  Diagnoses and all orders for this visit:    Carpal tunnel syndrome during pregnancy  -     Cock Up Wrist Splint  -     Ambulatory Referral to Physical Therapy; Future    Other orders  -     Prenatal Multivit-Min-Fe-FA (Prenatal, w/Iron & FA,) 27-0 8 MG TABS; Take 1 tablet by mouth daily        -likely carpal tunnel vs deQuervain  Will hold of on xray given patient is currently pregnant with relatively benign exam  Reviewed exercises, PT, and wrist splint  Tylenol PRN for pain, avoid NSAIDs during pregnancy  RTC if symptoms do not improve  Discussed if CP related to pregnancy it is likely it will resolve after delivery  Subjective:      Patient ID: Sussy Cowart is a 27 y o  female currently 31 weeks pregnant presents for evaluation of wrist pain  Notes it has been ongoing for about 2 weeks  Admits to punching a wall at some point but is unsure if pain started before or after that  There was no obvious pain, bruising or swelling after that  She also notes in occurs in her left hand as well  Worse at night with numbness/tingling and swelling to her fingers  She cannot pinpoint which fingers exactly  She states she sometimes sleeps with her right hand in her pocket to prevent it from moving  At times the pain is 10/10  HPI    The following portions of the patient's history were reviewed and updated as appropriate: allergies, current medications, past family history, past medical history, past social history, past surgical history and problem list     Review of Systems   Constitutional: Negative  HENT: Negative  Respiratory: Negative  Gastrointestinal: Negative  Musculoskeletal: Positive for arthralgias  Numbness/tingling to bilat hands   Neurological: Negative for weakness           Objective:      /92 (BP Location: Left arm, Patient Position: Sitting, Cuff Size: Adult)   Pulse 82   Temp 98 1 °F (36 7 °C) (Tympanic)   Resp 18   Ht 5' 2 5" (1 588 m)   Wt 91 8 kg (202 lb 6 4 oz)   LMP 05/16/2021   SpO2 98%   BMI 36 43 kg/m²          Physical Exam  Vitals reviewed  Constitutional:       Appearance: Normal appearance  Abdominal:      Comments: gravid   Musculoskeletal:      Right wrist: Tenderness present  No swelling, deformity, snuff box tenderness or crepitus  Decreased range of motion  Normal pulse  Left wrist: No swelling, deformity, tenderness, snuff box tenderness or crepitus  Normal range of motion  Normal pulse  Hands:       Comments: +tinnel    Neurological:      Mental Status: She is alert  Psychiatric:         Mood and Affect: Mood normal          Behavior: Behavior normal          Thought Content:  Thought content normal          Judgment: Judgment normal  yes

## 2023-11-15 ENCOUNTER — OFFICE VISIT (OUTPATIENT)
Dept: DENTISTRY | Facility: CLINIC | Age: 28
End: 2023-11-15
Payer: COMMERCIAL

## 2023-11-15 DIAGNOSIS — K03.6 ACCRETIONS ON TEETH: Primary | ICD-10-CM

## 2023-11-15 PROCEDURE — D0190 SCREENING OF A PATIENT: HCPCS | Performed by: DENTAL HYGIENIST

## 2023-11-15 PROCEDURE — D1330 ORAL HYGIENE INSTRUCTIONS: HCPCS | Performed by: DENTAL HYGIENIST

## 2023-11-15 PROCEDURE — D1110 PROPHYLAXIS - ADULT: HCPCS | Performed by: DENTAL HYGIENIST

## 2023-11-15 NOTE — PROGRESS NOTES
Adult Prophy      Chief Complaint   Patient presents with    Routine Oral Cleaning    No new dental concerns. Pt has general sensitivity and can not open wide due to jaw. She needed a pillow for her neck today. Method Used:  Matt Method Used: Hand Scaling  Polished  Flossed     Radiographs Taken in Dexis: (Taken to assess periodontal health)  None     Intra/Extra Oral Cancer Screening:  Within normal limits     Oral Hygiene:  Fair     Plaque:  Light     Calculus:  Light     Bleeding:  Light     Stain:  Light     Periodontal Charting:   Spot probing     Periodontal Classification:  Generalized  Mild  Gingivitis     Oral Hygiene Instruction:    Went over daily routine and c-shaped flossing. Discussed Oral systemic link and role of plaque in gum disease. No orders of the defined types were placed in this encounter. Next Visit:  6 Month prophy- bwx  Dentist visit- resin/periodic  Patient tucks napkin into shirt. Do not use Bib-Ez.

## 2024-02-13 NOTE — UTILIZATION REVIEW
Inpatient Admission Authorization Request   NOTIFICATION OF INPATIENT ADMISSION/INPATIENT AUTHORIZATION REQUEST   SERVICING FACILITY:   [address] [place] [address]  [address]  Tax ID:  [id number]  NPI: [id number]  Place of Service: Inpatient [address] [facility]  [address]  Place of Service Code: 24     ATTENDING PROVIDER:  Attending Name and NPI#: Scarlett Lima, [address] [[id number]]  Address: [address]  Phone: [phone]     UTILIZATION REVIEW CONTACT:  [facility] Utilization   Network Utilization Review Department  Phone: [phone]  Fax [phone]  Email: [email or web address]     PHYSICIAN ADVISORY SERVICES:  FOR SELU-OM-YMJV REVIEW - MEDICAL NECESSITY DENIAL  Phone: [phone]  Fax: [phone]  Email: [email or web address]     TYPE OF REQUEST:  Inpatient Status     ADMISSION INFORMATION:  ADMISSION DATE/TIME: 5/10/21 1524  PATIENT DIAGNOSIS CODE/DESCRIPTION:  Sinus tachycardia [R00 0]  Hypokalemia [E87 6]  Altered mental status [L85 33]  Toxic metabolic encephalopathy [C13]  Anticholinergic drug overdose, undetermined intent, initial encounter [O50 4U6O]  DISCHARGE DATE/TIME: 5/11/2021  1:01 PM  DISCHARGE DISPOSITION (IF DISCHARGED): Non [facility]     IMPORTANT INFORMATION:  Please contact the [facility] directly with any questions or concerns regarding this request  Department voicemails are confidential     Send requests for admission clinical reviews, concurrent reviews, approvals, and administrative denials due to lack of clinical to fax [phone]  Patient's daughter advised.

## 2024-05-22 ENCOUNTER — OFFICE VISIT (OUTPATIENT)
Dept: DENTISTRY | Facility: CLINIC | Age: 29
End: 2024-05-22

## 2024-05-22 VITALS — SYSTOLIC BLOOD PRESSURE: 131 MMHG | DIASTOLIC BLOOD PRESSURE: 87 MMHG | HEART RATE: 74 BPM

## 2024-05-22 DIAGNOSIS — K03.6 ACCRETIONS ON TEETH: Primary | ICD-10-CM

## 2024-05-22 RX ORDER — DEXTROAMPHETAMINE SACCHARATE, AMPHETAMINE ASPARTATE MONOHYDRATE, DEXTROAMPHETAMINE SULFATE AND AMPHETAMINE SULFATE 2.5; 2.5; 2.5; 2.5 MG/1; MG/1; MG/1; MG/1
CAPSULE, EXTENDED RELEASE ORAL 3 TIMES WEEKLY
COMMUNITY
Start: 2024-04-28

## 2024-05-22 RX ORDER — BUSPIRONE HYDROCHLORIDE 15 MG/1
15 TABLET ORAL 2 TIMES DAILY
COMMUNITY
Start: 2024-05-11

## 2024-05-22 NOTE — PROGRESS NOTES
Adult Umay        Chief Complaint   Patient presents with    Routine Oral Cleaning    No new dental concerns. Pt has general sensitivity and can not open wide due to jaw.     Pt states she is not flossing as well. She states she drinks tea and coffee for prolong periods. Pt understands pH and sugar on teeth and role they play in decay.    Pt hs 3-4mm recession on LL areas (took IO photos) - from tongue ring. Advised removal or to help she can switch to plastic balls. It will likely still recess if pt does not remove completely. Pt showed me how she uses her tongue ring and rubs the back of her teeth. Pt saw IO photos in comparison to a few years ago and how the recession is progressing.      Method Used:  Óscar Method Used: Hand Scaling  Polished  Flossed     Radiographs Taken in Dexis: (Taken to assess periodontal health)  FMX     Intra/Extra Oral Cancer Screening:  Within normal limits     Oral Hygiene:  Fair     Plaque:  Light     Calculus:  Light     Bleeding:  Light     Stain:  Light     Periodontal Charting:   Spot probing     Periodontal Classification:  Generalized  Mild  Gingivitis     Oral Hygiene Instruction:    Went over daily routine and c-shaped flossing.      Discussed Oral systemic link and role of plaque in gum disease.     No orders of the defined types were placed in this encounter.        Next Visit:  6 Month óscar  Dentist visit- resin/periodic  Patient tucks napkin into shirt. Do not use Bib-Ez.

## 2024-09-04 ENCOUNTER — HOSPITAL ENCOUNTER (EMERGENCY)
Facility: HOSPITAL | Age: 29
Discharge: HOME | End: 2024-09-04
Attending: EMERGENCY MEDICINE | Admitting: EMERGENCY MEDICINE
Payer: COMMERCIAL

## 2024-09-04 VITALS
SYSTOLIC BLOOD PRESSURE: 129 MMHG | TEMPERATURE: 98.7 F | RESPIRATION RATE: 18 BRPM | HEART RATE: 107 BPM | DIASTOLIC BLOOD PRESSURE: 77 MMHG | OXYGEN SATURATION: 97 %

## 2024-09-04 DIAGNOSIS — J02.9 SORE THROAT: ICD-10-CM

## 2024-09-04 DIAGNOSIS — J02.9 ACUTE PHARYNGITIS: Primary | ICD-10-CM

## 2024-09-04 LAB
FLUAV RNA RESP QL NAA+PROBE: NEGATIVE
FLUBV RNA RESP QL NAA+PROBE: NEGATIVE
RSV RNA RESP QL NAA+PROBE: NEGATIVE
S PYO DNA THROAT QL NAA+PROBE: DETECTED
SARS-COV-2 RNA RESP QL NAA+PROBE: NEGATIVE

## 2024-09-04 PROCEDURE — 0241U HB NFCT DS VIR RESP RNA 4 TRGT: CPT | Performed by: EMERGENCY MEDICINE

## 2024-09-04 PROCEDURE — 87651 STREP A DNA AMP PROBE: CPT | Performed by: EMERGENCY MEDICINE

## 2024-09-04 PROCEDURE — 99282 EMERGENCY DEPT VISIT SF MDM: CPT

## 2024-09-04 PROCEDURE — 99284 EMERGENCY DEPT VISIT MOD MDM: CPT | Performed by: EMERGENCY MEDICINE

## 2024-09-04 RX ORDER — AMOXICILLIN 500 MG/1
500 CAPSULE ORAL 2 TIMES DAILY
Qty: 20 CAPSULE | Refills: 0 | Status: SHIPPED | OUTPATIENT
Start: 2024-09-04 | End: 2024-09-14

## 2024-09-04 RX ORDER — IBUPROFEN 600 MG/1
600 TABLET, FILM COATED ORAL EVERY 6 HOURS PRN
Qty: 30 TABLET | Refills: 0 | Status: SHIPPED | OUTPATIENT
Start: 2024-09-04

## 2024-09-04 RX ORDER — IBUPROFEN 600 MG/1
600 TABLET, FILM COATED ORAL ONCE
Status: COMPLETED | OUTPATIENT
Start: 2024-09-04 | End: 2024-09-04

## 2024-09-04 RX ORDER — DEXAMETHASONE 4 MG/1
10 TABLET ORAL ONCE
Status: COMPLETED | OUTPATIENT
Start: 2024-09-04 | End: 2024-09-04

## 2024-09-04 RX ADMIN — IBUPROFEN 600 MG: 600 TABLET, FILM COATED ORAL at 11:12

## 2024-09-04 RX ADMIN — DEXAMETHASONE 10 MG: 4 TABLET ORAL at 11:12

## 2024-09-04 NOTE — ED PROVIDER NOTES
History  Chief Complaint   Patient presents with    Sore Throat     Sore throat started Monday night patient thinks she might have strep throat        Sore Throat  Associated symptoms: no abdominal pain, no chest pain, no chills, no cough, no ear pain, no fever, no rash, no shortness of breath, no trouble swallowing and no voice change      29-year-old female presented to the ER for evaluation of sore throat.  Patient reports 2 to 3 days of sore throat which began gradually.  Does report some recent sick contacts but they were not complaining of significant sore throat.  Patient denies any recent illnesses or antibiotic use no recent strep throat infections.  Patient reports sore throat with pain with swallowing eating.  Denies change in voice.  Does note some mild congestion and cough.  Denies fevers.  Denies nausea vomiting abdominal pain back pain.  No ear pain.  No known allergies.  Prior to Admission Medications   Prescriptions Last Dose Informant Patient Reported? Taking?   amphetamine-dextroamphetamine (ADDERALL XR) 10 MG 24 hr capsule 9/3/2024  Yes Yes   Sig: Take by mouth 3 (three) times a week   busPIRone (BUSPAR) 15 mg tablet 9/3/2024  Yes Yes   Sig: Take 15 mg by mouth 2 (two) times a day   ibuprofen (MOTRIN) 800 mg tablet   Yes No   Sig: TAKE 1 TABLET BY MOUTH EVERY 6 HOURS AS NEEDED FOR MILD PAIN (PAIN SCORE 1-3)   Patient not taking: Reported on 10/24/2022      Facility-Administered Medications: None       Past Medical History:   Diagnosis Date    Depression     Insomnia     Psychiatric disorder     UTI (urinary tract infection)     Vertigo        Past Surgical History:   Procedure Laterality Date    WISDOM TOOTH EXTRACTION         Family History   Problem Relation Age of Onset    Heart disease Father     Cancer Paternal Grandmother         breast     I have reviewed and agree with the history as documented.    E-Cigarette/Vaping    E-Cigarette Use Former User      E-Cigarette/Vaping Substances     Nicotine Yes     THC No     CBD No     Flavoring No     Other No     Unknown No      Social History     Tobacco Use    Smoking status: Former     Current packs/day: 1.00     Types: Cigarettes    Smokeless tobacco: Never    Tobacco comments:     trying to cut back   Vaping Use    Vaping status: Former    Substances: Nicotine   Substance Use Topics    Alcohol use: Not Currently     Comment: occasional    Drug use: No       Review of Systems   Constitutional:  Negative for chills and fever.   HENT:  Positive for congestion and sore throat. Negative for ear pain, trouble swallowing and voice change.    Eyes:  Negative for pain and visual disturbance.   Respiratory:  Negative for cough and shortness of breath.    Cardiovascular:  Negative for chest pain and palpitations.   Gastrointestinal:  Negative for abdominal pain and vomiting.   Genitourinary:  Negative for dysuria and hematuria.   Musculoskeletal:  Negative for arthralgias and back pain.   Skin:  Negative for color change and rash.   Neurological:  Negative for seizures and syncope.   All other systems reviewed and are negative.      Physical Exam  Physical Exam  Vitals and nursing note reviewed.   Constitutional:       General: She is not in acute distress.     Appearance: She is well-developed.   HENT:      Head: Normocephalic and atraumatic.      Nose: No rhinorrhea.      Mouth/Throat:      Mouth: Mucous membranes are moist. No oral lesions.      Pharynx: Uvula midline. Posterior oropharyngeal erythema present.      Tonsils: Tonsillar exudate present.   Eyes:      Conjunctiva/sclera: Conjunctivae normal.   Cardiovascular:      Rate and Rhythm: Normal rate and regular rhythm.      Heart sounds: No murmur heard.  Pulmonary:      Effort: Pulmonary effort is normal. No respiratory distress.      Breath sounds: Normal breath sounds. No stridor. No wheezing or rales.   Abdominal:      Palpations: Abdomen is soft.      Tenderness: There is no abdominal tenderness.    Musculoskeletal:         General: No swelling.      Cervical back: Full passive range of motion without pain and neck supple.   Lymphadenopathy:      Cervical: Cervical adenopathy present.   Skin:     General: Skin is warm and dry.      Capillary Refill: Capillary refill takes less than 2 seconds.   Neurological:      Mental Status: She is alert.   Psychiatric:         Mood and Affect: Mood normal.         Vital Signs  ED Triage Vitals [09/04/24 1025]   Temperature Pulse Respirations Blood Pressure SpO2   98.7 °F (37.1 °C) (!) 107 18 129/77 97 %      Temp Source Heart Rate Source Patient Position - Orthostatic VS BP Location FiO2 (%)   Temporal Monitor Sitting Right arm --      Pain Score       --           Vitals:    09/04/24 1025   BP: 129/77   Pulse: (!) 107   Patient Position - Orthostatic VS: Sitting         Visual Acuity      ED Medications  Medications   dexamethasone (DECADRON) tablet 10 mg (has no administration in time range)   ibuprofen (MOTRIN) tablet 600 mg (has no administration in time range)       Diagnostic Studies  Results Reviewed       Procedure Component Value Units Date/Time    FLU/RSV/COVID - if FLU/RSV clinically relevant [[id number]] Collected: 09/04/24 1034    Lab Status: In process Specimen: Nares from Nasopharyngeal Swab Updated: 09/04/24 1041    Strep A PCR [[id number]] Collected: 09/04/24 1034    Lab Status: In process Specimen: Throat Updated: 09/04/24 1041                   No orders to display              Procedures  Procedures         ED Course                                 SBIRT 22yo+      Flowsheet Row Most Recent Value   Initial Alcohol Screen: US AUDIT-C     1. How often do you have a drink containing alcohol? 0 Filed at: 09/04/2024 1029   2. How many drinks containing alcohol do you have on a typical day you are drinking?  0 Filed at: 09/04/2024 1029   3a. Male UNDER 65: How often do you have five or more drinks on one occasion? 0 Filed at: 09/04/2024 1029   3b. FEMALE Any  Age, or MALE 65+: How often do you have 4 or more drinks on one occassion? 0 Filed at: 09/04/2024 1029   Audit-C Score 0 Filed at: 09/04/2024 1029   ARACELI: How many times in the past year have you...    Used an illegal drug or used a prescription medication for non-medical reasons? Never Filed at: 09/04/2024 1029                      Medical Decision Making  29-year-old female presenting with acute pharyngitis.  Will swab for strep, viral etiologies for given.  Decadron and Motrin discharge pending swab results with plan to initiate on antibiotics if strep positive and likely viral/supportive care if negative.  Agreeable to plan will discharge.    Problems Addressed:  Acute pharyngitis: acute illness or injury    Amount and/or Complexity of Data Reviewed  Labs: ordered.    Risk  Prescription drug management.                 Disposition  Final diagnoses:   Acute pharyngitis     Time reflects when diagnosis was documented in both MDM as applicable and the Disposition within this note       Time User Action Codes Description Comment    9/4/2024 10:54 AM Samuel Craft Add [J02.9] Acute pharyngitis           ED Disposition       None          Follow-up Information       Follow up With Specialties Details Why Contact Info Additional Information    [facility] Schedule an appointment as soon as possible for a visit  As needed, If symptoms worsen [address] [facility] [address]  [phone]       [facility] Emergency Department Emergency Medicine Go to  If symptoms worsen [address] [facility] [id number] [phone] [facility] Emergency Department, [address], [place], [address]            Patient's Medications   Discharge Prescriptions    IBUPROFEN (MOTRIN) 600 MG TABLET    Take 1 tablet (600 mg total) by mouth every 6 (six) hours as needed for moderate pain       Start Date: 9/4/2024  End Date: --       Order  Dose: 600 mg       Quantity: 30 tablet    Refills: 0       No discharge procedures on file.    PDMP Review       None            ED Provider  Electronically Signed by             Samuel Craft DO  09/04/24 3664

## 2024-09-04 NOTE — Clinical Note
Roxanna Patton was seen and treated in our emergency department on 9/4/2024.                Diagnosis:     Roxanna  .    She may return on this date: 09/06/2024    Patient seen and examined in the emergency department on 9/4/2024.  Please excuse for absence from work 9/3/2024, 9/4/2024, 9/5/2024, patient may return on 9/6/2024.     If you have any questions or concerns, please don't hesitate to call.      Samuel Craft, DO    ______________________________           _______________          _______________  Hospital Representative                              Date                                Time

## 2024-09-04 NOTE — DISCHARGE INSTRUCTIONS
Typically sore throat is either due to a viral cause or strep bacteria.  We are swabbing for both.  If strep positive you will be notified by phone and appropriate antibiotics will be called into your pharmacy.    Regardless we are treating with a dose of a steroid medicine as well as high-dose NSAIDs.  Take Motrin 600 mg as needed every 6 hours for discomfort.  Drink plenty of fluids.  Perform salt water swish/gargle to help with symptoms.  Return if worsening severe swelling pain with neck movement or other concerns.

## 2024-09-09 ENCOUNTER — OFFICE VISIT (OUTPATIENT)
Dept: DENTISTRY | Facility: CLINIC | Age: 29
End: 2024-09-09
Payer: COMMERCIAL

## 2024-09-09 VITALS — DIASTOLIC BLOOD PRESSURE: 87 MMHG | SYSTOLIC BLOOD PRESSURE: 135 MMHG | HEART RATE: 72 BPM

## 2024-09-09 DIAGNOSIS — K02.9 TOOTH DECAY: Primary | ICD-10-CM

## 2024-09-09 PROCEDURE — D0120 PERIODIC ORAL EVALUATION - ESTABLISHED PATIENT: HCPCS | Performed by: STUDENT IN AN ORGANIZED HEALTH CARE EDUCATION/TRAINING PROGRAM

## 2024-09-09 RX ORDER — SODIUM FLUORIDE 5 MG/ML
PASTE, DENTIFRICE DENTAL
Qty: 51 G | Refills: 20 | Status: SHIPPED | OUTPATIENT
Start: 2024-09-09

## 2024-09-09 NOTE — PROGRESS NOTES
Periodic Exam    Roxanna Patton presents for periodic exam. Reviewed PMH, no changes.     Completed oral cancer screening, no abnormal findings.   Obtained FMX last visit and reviewed findings. Completed restorative exam and perio spot probing. New restorative needs charted.   Talked to pt about diet (pt reported drinking vitamin water, coffee and guers green tea throughout the day). Talked about limiting that to mealtimes and only having water in between meals.     NV: Resins

## 2024-10-11 ENCOUNTER — OFFICE VISIT (OUTPATIENT)
Dept: URGENT CARE | Facility: MEDICAL CENTER | Age: 29
End: 2024-10-11
Payer: COMMERCIAL

## 2024-10-11 VITALS
HEART RATE: 83 BPM | SYSTOLIC BLOOD PRESSURE: 118 MMHG | TEMPERATURE: 98.7 F | OXYGEN SATURATION: 99 % | DIASTOLIC BLOOD PRESSURE: 80 MMHG | BODY MASS INDEX: 26.75 KG/M2 | HEIGHT: 63 IN | RESPIRATION RATE: 18 BRPM | WEIGHT: 151 LBS

## 2024-10-11 DIAGNOSIS — L03.012 PARONYCHIA OF LEFT THUMB: Primary | ICD-10-CM

## 2024-10-11 PROCEDURE — 99212 OFFICE O/P EST SF 10 MIN: CPT | Performed by: PHYSICIAN ASSISTANT

## 2024-10-11 RX ORDER — CEPHALEXIN 500 MG/1
500 CAPSULE ORAL EVERY 8 HOURS SCHEDULED
Qty: 21 CAPSULE | Refills: 0 | Status: SHIPPED | OUTPATIENT
Start: 2024-10-11 | End: 2024-10-18

## 2024-10-11 NOTE — PROGRESS NOTES
[facility]        NAME: Roxanna Patton is a 29 y.o. female  : 1995    MRN: [id number]  DATE: 2024  TIME: 2:28 PM    Assessment and Plan   Paronychia of left thumb [L03.012]  1. Paronychia of left thumb  cephalexin (KEFLEX) 500 mg capsule            Patient Instructions     Soak thumb in warm soapy water for 10 minutes daily  Start Keflex  If symptoms worsen have yourself rechecked    Follow up with PCP in 3-5 days.  Proceed to  ER if symptoms worsen.    If tests have been performed at [facility], our office will contact you with results if changes need to be made to the care plan discussed with you at the visit.  [facility]    Chief Complaint     Chief Complaint   Patient presents with    Finger Pain     Left thumb pain that started after she popped a blister around the finger nail          History of Present Illness       Patient presents with left thumb pain and swelling and redness which started a week ago.  There was a fluid collection which had drained on its own.  She has continued redness and tenderness at the base of the nail.  No fever or chills.        Review of Systems   Review of Systems   Constitutional:  Negative for chills and fever.   Skin:  Positive for wound.         Current Medications       Current Outpatient Medications:     amphetamine-dextroamphetamine (ADDERALL XR) 10 MG 24 hr capsule, Take by mouth 3 (three) times a week, Disp: , Rfl:     busPIRone (BUSPAR) 15 mg tablet, Take 15 mg by mouth 2 (two) times a day, Disp: , Rfl:     cephalexin (KEFLEX) 500 mg capsule, Take 1 capsule (500 mg total) by mouth every 8 (eight) hours for 7 days, Disp: 21 capsule, Rfl: 0    ibuprofen (MOTRIN) 600 mg tablet, Take 1 tablet (600 mg total) by mouth every 6 (six) hours as needed for moderate pain, Disp: 30 tablet, Rfl: 0    SODIUM FLUORIDE, DENTAL GEL, 1.1 % CREA, Apply to teeth daily at bedtime brush daily at bedtime, spit out, don't  "rinse. Floss. Nothing to eat or drink after., Disp: 51 g, Rfl: 20    ibuprofen (MOTRIN) 800 mg tablet, TAKE 1 TABLET BY MOUTH EVERY 6 HOURS AS NEEDED FOR MILD PAIN (PAIN SCORE 1-3) (Patient not taking: Reported on 10/24/2022), Disp: , Rfl:     Current Allergies     Allergies as of 10/11/2024    (No Known Allergies)            The following portions of the patient's history were reviewed and updated as appropriate: allergies, current medications, past family history, past medical history, past social history, past surgical history and problem list.     Past Medical History:   Diagnosis Date    Depression     Insomnia     Psychiatric disorder     UTI (urinary tract infection)     Vertigo        Past Surgical History:   Procedure Laterality Date    WISDOM TOOTH EXTRACTION         Family History   Problem Relation Age of Onset    Heart disease Father     Cancer Paternal Grandmother         breast         Medications have been verified.        Objective   /80   Pulse 83   Temp 98.7 °F (37.1 °C)   Resp 18   Ht 5' 3\" (1.6 m)   Wt 68.5 kg (151 lb)   SpO2 99%   BMI 26.75 kg/m²   No LMP recorded.       Physical Exam     Physical Exam  Vitals and nursing note reviewed.   Constitutional:       Appearance: Normal appearance.   HENT:      Head: Normocephalic and atraumatic.   Cardiovascular:      Rate and Rhythm: Normal rate.   Pulmonary:      Effort: Pulmonary effort is normal.   Skin:     General: Skin is warm.      Comments: Left thumb with erythema swelling tenderness at the base of the nail consistent with paronychia.  No fluid collection for drainage.   Neurological:      Mental Status: She is alert.                   "

## 2024-10-11 NOTE — PATIENT INSTRUCTIONS
Soak thumb in warm soapy water for 10 minutes daily  Start Keflex  If symptoms worsen have yourself rechecked

## 2024-10-15 ENCOUNTER — TELEPHONE (OUTPATIENT)
Dept: DENTISTRY | Facility: CLINIC | Age: 29
End: 2024-10-15

## 2024-10-16 ENCOUNTER — TELEPHONE (OUTPATIENT)
Dept: FAMILY MEDICINE CLINIC | Facility: CLINIC | Age: 29
End: 2024-10-16

## 2024-11-06 ENCOUNTER — OFFICE VISIT (OUTPATIENT)
Dept: FAMILY MEDICINE CLINIC | Facility: CLINIC | Age: 29
End: 2024-11-06
Payer: COMMERCIAL

## 2024-11-06 VITALS
SYSTOLIC BLOOD PRESSURE: 128 MMHG | TEMPERATURE: 98.6 F | RESPIRATION RATE: 18 BRPM | DIASTOLIC BLOOD PRESSURE: 84 MMHG | WEIGHT: 149 LBS | BODY MASS INDEX: 26.4 KG/M2 | OXYGEN SATURATION: 97 % | HEIGHT: 63 IN | HEART RATE: 72 BPM

## 2024-11-06 DIAGNOSIS — F33.42 RECURRENT MAJOR DEPRESSIVE DISORDER, IN FULL REMISSION (HCC): ICD-10-CM

## 2024-11-06 DIAGNOSIS — Z87.891 FORMER SMOKER: ICD-10-CM

## 2024-11-06 DIAGNOSIS — R79.89 ELEVATED TSH: ICD-10-CM

## 2024-11-06 DIAGNOSIS — T14.91XA SUICIDE ATTEMPT (HCC): ICD-10-CM

## 2024-11-06 DIAGNOSIS — Z13.9 SCREENING DUE: ICD-10-CM

## 2024-11-06 DIAGNOSIS — N92.0 MENORRHAGIA WITH REGULAR CYCLE: ICD-10-CM

## 2024-11-06 DIAGNOSIS — Z00.00 ANNUAL PHYSICAL EXAM: Primary | ICD-10-CM

## 2024-11-06 PROBLEM — F95.2 TOURETTE'S: Status: ACTIVE | Noted: 2024-11-06

## 2024-11-06 PROBLEM — F41.1 GENERALIZED ANXIETY DISORDER: Status: ACTIVE | Noted: 2024-11-06

## 2024-11-06 PROCEDURE — T1015 CLINIC SERVICE: HCPCS | Performed by: FAMILY MEDICINE

## 2024-11-06 PROCEDURE — 99395 PREV VISIT EST AGE 18-39: CPT

## 2024-11-06 NOTE — PROGRESS NOTES
Adult Annual Physical  Name: Roxanna Patton      : 1995      MRN: [id number]  Encounter Provider: Bharat Winter MD  Encounter Date: 2024   Encounter department: [facility]    Patient's depression is in full remission at this time, may have had bipolar 2 with hypomania based on digfast questions, recommended patient request to do talk therapy to alleviate her current anxiety but given she has psych stability and no suicidal or homicidal ideation at this time and no current depression plus she does not wish to go on any new psychiatric medications, will defer to her treatment psychiatry team at this time. Will give medical record release forms to fill out so we can [facility].  Will get CBC for heavy menses which appear to be patient's baseline and are not significant concern for me at this time, will get TSH given her previous thyroid hormone level abnormalities in , will get basic screening labs.  Discussed various health maintenance topics.  Declines flu shot today.  Meds reconciled  Assessment & Plan  Annual physical exam    Orders:    Lipid panel; Future    Basic metabolic panel; Future    CBC and differential; Future    TSH, 3rd generation with Free T4 reflex; Future    Screening due    Orders:    Lipid panel; Future    Basic metabolic panel; Future    CBC and differential; Future    TSH, 3rd generation with Free T4 reflex; Future    Elevated TSH    Orders:    TSH, 3rd generation with Free T4 reflex; Future    Former smoker         Menorrhagia with regular cycle    Orders:    CBC and differential; Future    Suicide attempt (HCC)         Recurrent major depressive disorder, in full remission (HCC)         Immunizations and preventive care screenings were discussed with patient today. Appropriate education was printed on patient's after visit summary.    Counseling:  Dental Health: discussed importance of regular tooth brushing,  "flossing, and dental visits.  Sexual health: discussed sexually transmitted diseases, partner selection, use of condoms, avoidance of unintended pregnancy, and contraceptive alternatives.  Exercise: the importance of regular exercise/physical activity was discussed. Recommend exercise 3-5 times per week for at least 30 minutes.          History of Present Illness   CC: annual physical  Notes she is anxious at appointments and almost rescheduled this one, dislikes shopping. Takes buspar BID scheduled without much benefit. Anxiety worsened after having kid. Very very averse to weight gaining medications, refuses to take any medications that could lead to weight gain.  Needs pap scheduled, has gynecologist and will do there  Rare alcohol use, no drugs, former smoker until 2021, smoked 0.5-1ppd and then switched to vaping, started when 14yo, <10 pack years  Had elevated TSH in past in 2021.  Hx: MDD & anxiety + suicide attempt 2021 w/diphenhydramine + benzodiazepine overdose (s/p 3d hospitalization at [place] where declined antidepressant but got CBT + home CBT later), possible borderline personality disorder per [facility] records around time of overdose. Was in counseling for a time but \"fell off\" due to pregnancy and used to be on wellbutrin buspar and lamictal prior to pregnancy. Years prior, was on zoloft, celexa. Currently only on buspar and adderall XR, takes adderall only when she works. States was dx'd with ADHD when [age]yo on adderall XR by psychiatry Dr. Gifford [facility]. Hx Tourettes    States she felt manic in 2020. In the past had distractibility, more irritability, some insomnia with good energy (was drinking much caffeine as confounder), no grandiosity, occasional flight of ideas above ADHD baseline, occasional fast driving risky behavior and frequent drinking with friends (no longer much alcohol) but no increased goal directed activity, no talkativeness. This did not " impair her functioning in society, was not at work at that time however.    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    Adult Annual Physical:  Patient presents for annual physical.     Diet and Physical Activity:  - Diet/Nutrition: well balanced diet and frequent junk food. Half the time gets all food groups, really likes sweets  - Exercise: no formal exercise. Dog grooming & bathing, caring for a 2 year old    Depression Screening:    - PHQ-9 Score: 4    General Health:  - Sleep: sleeps well and 7-8 hours of sleep on average. restful  - Hearing: normal hearing right ear and normal hearing left ear.  - Vision: no vision problems and wears glasses.  - Dental: regular dental visits. Brushes 1-2x/day, flosses not daily though, discussed dental hygeine    /GYN Health:  - Follows with GYN: yes.   - Menopause: premenopausal.   - History of STDs: no  - Contraception: barrier methods. On period currently, 28-30d apart, last 5-7d, starts heavy soaking through pads and then levels out, always been this way, not on folate multivitamin, does not wish to become pregnant. Does not wish to undergo birth control      Advanced Care Planning:  - Has an advanced directive?: no    - Has a durable medical POA?: no    - ACP document given to patient?: yes      Review of Systems   Constitutional:  Negative for chills and fever.   Eyes:  Negative for pain and visual disturbance.   Respiratory:  Negative for cough and shortness of breath.    Cardiovascular:  Negative for chest pain and palpitations.   Gastrointestinal:  Negative for abdominal pain, diarrhea, nausea and vomiting.   Genitourinary:  Negative for difficulty urinating, dysuria, hematuria and menstrual problem.   Musculoskeletal:  Negative for arthralgias and back pain.   Skin:  Negative for color change and rash.   Neurological:  Positive for dizziness (when standing quickly, drinks 1 cup of water a day, some snapple and green tea and much caffeinated beverages)).  "Negative for seizures and syncope.   Psychiatric/Behavioral:  Negative for agitation and confusion.    All other systems reviewed and are negative.        Objective   /84   Pulse 72   Temp 98.6 °F (37 °C)   Resp 18   Ht 5' 3\" (1.6 m)   Wt 67.6 kg (149 lb)   SpO2 97%   BMI 26.39 kg/m²     Physical Exam  Vitals and nursing note reviewed.   Constitutional:       General: She is not in acute distress.     Appearance: She is well-developed.   HENT:      Head: Normocephalic and atraumatic.      Nose: No rhinorrhea.   Eyes:      General: No scleral icterus.        Right eye: No discharge.         Left eye: No discharge.      Conjunctiva/sclera: Conjunctivae normal.   Neck:      Comments: No thyromegaly, did elicit motor tic of grimace when testing  Cardiovascular:      Rate and Rhythm: Normal rate and regular rhythm.      Pulses: Normal pulses.      Heart sounds: Normal heart sounds. No murmur heard.     No friction rub. No gallop.   Pulmonary:      Effort: Pulmonary effort is normal. No respiratory distress.      Breath sounds: Normal breath sounds. No stridor. No wheezing, rhonchi or rales.   Abdominal:      General: Bowel sounds are normal. There is no distension.      Palpations: Abdomen is soft. There is no mass.      Tenderness: There is no abdominal tenderness. There is no guarding or rebound.   Musculoskeletal:         General: No swelling.      Cervical back: Neck supple.   Skin:     General: Skin is warm and dry.      Coloration: Skin is not jaundiced or pale.   Neurological:      Mental Status: She is alert.      Comments: No facial droop, slurred speech or gross focal deficits noted   Psychiatric:         Mood and Affect: Mood normal.         Behavior: Behavior normal.      Comments: No SI/HI         "

## 2024-11-20 ENCOUNTER — APPOINTMENT (OUTPATIENT)
Dept: LAB | Facility: HOSPITAL | Age: 29
End: 2024-11-20
Payer: COMMERCIAL

## 2024-11-20 DIAGNOSIS — R79.89 ELEVATED TSH: ICD-10-CM

## 2024-11-20 DIAGNOSIS — Z13.9 SCREENING DUE: ICD-10-CM

## 2024-11-20 DIAGNOSIS — Z00.00 ANNUAL PHYSICAL EXAM: ICD-10-CM

## 2024-11-20 DIAGNOSIS — N92.0 MENORRHAGIA WITH REGULAR CYCLE: ICD-10-CM

## 2024-11-20 LAB
ANION GAP SERPL CALCULATED.3IONS-SCNC: 9 MMOL/L (ref 4–13)
BASOPHILS # BLD AUTO: 0.01 THOUSANDS/ÂΜL (ref 0–0.1)
BASOPHILS NFR BLD AUTO: 0 % (ref 0–1)
BUN SERPL-MCNC: 9 MG/DL (ref 5–25)
CALCIUM SERPL-MCNC: 9.5 MG/DL (ref 8.4–10.2)
CHLORIDE SERPL-SCNC: 106 MMOL/L (ref 96–108)
CHOLEST SERPL-MCNC: 177 MG/DL (ref ?–200)
CO2 SERPL-SCNC: 24 MMOL/L (ref 21–32)
CREAT SERPL-MCNC: 0.56 MG/DL (ref 0.6–1.3)
EOSINOPHIL # BLD AUTO: 0.05 THOUSAND/ÂΜL (ref 0–0.61)
EOSINOPHIL NFR BLD AUTO: 1 % (ref 0–6)
ERYTHROCYTE [DISTWIDTH] IN BLOOD BY AUTOMATED COUNT: 13.7 % (ref 11.6–15.1)
GFR SERPL CREATININE-BSD FRML MDRD: 128 ML/MIN/1.73SQ M
GLUCOSE P FAST SERPL-MCNC: 92 MG/DL (ref 65–99)
HCT VFR BLD AUTO: 35.1 % (ref 34.8–46.1)
HDLC SERPL-MCNC: 68 MG/DL
HGB BLD-MCNC: 11.4 G/DL (ref 11.5–15.4)
IMM GRANULOCYTES # BLD AUTO: 0.01 THOUSAND/UL (ref 0–0.2)
IMM GRANULOCYTES NFR BLD AUTO: 0 % (ref 0–2)
LDLC SERPL CALC-MCNC: 94 MG/DL (ref 0–100)
LYMPHOCYTES # BLD AUTO: 1.78 THOUSANDS/ÂΜL (ref 0.6–4.47)
LYMPHOCYTES NFR BLD AUTO: 40 % (ref 14–44)
MCH RBC QN AUTO: 26.5 PG (ref 26.8–34.3)
MCHC RBC AUTO-ENTMCNC: 32.5 G/DL (ref 31.4–37.4)
MCV RBC AUTO: 81 FL (ref 82–98)
MONOCYTES # BLD AUTO: 0.27 THOUSAND/ÂΜL (ref 0.17–1.22)
MONOCYTES NFR BLD AUTO: 6 % (ref 4–12)
NEUTROPHILS # BLD AUTO: 2.38 THOUSANDS/ÂΜL (ref 1.85–7.62)
NEUTS SEG NFR BLD AUTO: 53 % (ref 43–75)
NONHDLC SERPL-MCNC: 109 MG/DL
NRBC BLD AUTO-RTO: 0 /100 WBCS
PLATELET # BLD AUTO: 200 THOUSANDS/UL (ref 149–390)
PMV BLD AUTO: 10.6 FL (ref 8.9–12.7)
POTASSIUM SERPL-SCNC: 3.7 MMOL/L (ref 3.5–5.3)
RBC # BLD AUTO: 4.31 MILLION/UL (ref 3.81–5.12)
SODIUM SERPL-SCNC: 139 MMOL/L (ref 135–147)
TRIGL SERPL-MCNC: 73 MG/DL (ref ?–150)
TSH SERPL DL<=0.05 MIU/L-ACNC: 4.07 UIU/ML (ref 0.45–4.5)
WBC # BLD AUTO: 4.5 THOUSAND/UL (ref 4.31–10.16)

## 2024-11-20 PROCEDURE — 80061 LIPID PANEL: CPT

## 2024-11-20 PROCEDURE — 80048 BASIC METABOLIC PNL TOTAL CA: CPT

## 2024-11-20 PROCEDURE — 84443 ASSAY THYROID STIM HORMONE: CPT

## 2024-11-20 PROCEDURE — 85025 COMPLETE CBC W/AUTO DIFF WBC: CPT

## 2024-11-20 PROCEDURE — 36415 COLL VENOUS BLD VENIPUNCTURE: CPT

## 2024-11-21 ENCOUNTER — PATIENT MESSAGE (OUTPATIENT)
Dept: FAMILY MEDICINE CLINIC | Facility: CLINIC | Age: 29
End: 2024-11-21

## 2024-11-21 DIAGNOSIS — D50.9 MICROCYTIC ANEMIA: Primary | ICD-10-CM

## 2024-11-27 ENCOUNTER — OFFICE VISIT (OUTPATIENT)
Dept: DENTISTRY | Facility: CLINIC | Age: 29
End: 2024-11-27
Payer: COMMERCIAL

## 2024-11-27 VITALS — DIASTOLIC BLOOD PRESSURE: 87 MMHG | HEART RATE: 84 BPM | SYSTOLIC BLOOD PRESSURE: 126 MMHG

## 2024-11-27 DIAGNOSIS — K03.6 ACCRETIONS ON TEETH: Primary | ICD-10-CM

## 2024-11-27 PROCEDURE — D1330 ORAL HYGIENE INSTRUCTIONS: HCPCS | Performed by: DENTAL HYGIENIST

## 2024-11-27 PROCEDURE — D0190 SCREENING OF A PATIENT: HCPCS | Performed by: DENTAL HYGIENIST

## 2024-11-27 PROCEDURE — D1110 PROPHYLAXIS - ADULT: HCPCS | Performed by: DENTAL HYGIENIST

## 2024-11-27 NOTE — PROGRESS NOTES
Adult Óscar  Chief Complaint   Patient presents with    Routine Oral Cleaning   No bibez - pt tucks napkin into shirt.  Squirt water and let pt swish.  Pt has jaw issues and needs frequent breaks  Pt using NOBS paste.    Method Used:  Óscar Method Used: Hand Scaling  Polished  Flossed  Fluoride    Radiographs Taken in Dexis: (Taken to assess periodontal health)  None    Intra/Extra Oral Cancer Screening:  Within normal limits    Oral Hygiene:  Fair    Plaque:  Light    Calculus:  Light    Bleeding:  Light    Stain:  Light    Periodontal Charting:   Spot probing    Periodontal Classification:  Generalized  Mild  Periodontal Disease      Oral Hygiene Instruction:    Went over daily routine and c-shaped flossing.     No orders of the defined types were placed in this encounter.       Next Visit:  6 Month óscar w/ BW sched periodic b4 resins   Dentist visit- resins

## 2025-05-28 ENCOUNTER — OFFICE VISIT (OUTPATIENT)
Dept: DENTISTRY | Facility: CLINIC | Age: 30
End: 2025-05-28
Payer: COMMERCIAL

## 2025-05-28 DIAGNOSIS — K03.6 ACCRETIONS ON TEETH: Primary | ICD-10-CM

## 2025-05-28 PROCEDURE — D0190 SCREENING OF A PATIENT: HCPCS | Performed by: DENTAL HYGIENIST

## 2025-05-28 PROCEDURE — D1330 ORAL HYGIENE INSTRUCTIONS: HCPCS | Performed by: DENTAL HYGIENIST

## 2025-05-28 PROCEDURE — D1110 PROPHYLAXIS - ADULT: HCPCS | Performed by: DENTAL HYGIENIST

## 2025-05-28 RX ORDER — VENLAFAXINE HYDROCHLORIDE 37.5 MG/1
1 CAPSULE, EXTENDED RELEASE ORAL DAILY
COMMUNITY
Start: 2025-03-10

## 2025-07-18 ENCOUNTER — OFFICE VISIT (OUTPATIENT)
Dept: URGENT CARE | Facility: MEDICAL CENTER | Age: 30
End: 2025-07-18
Payer: COMMERCIAL

## 2025-07-18 VITALS
HEART RATE: 75 BPM | WEIGHT: 150 LBS | SYSTOLIC BLOOD PRESSURE: 136 MMHG | OXYGEN SATURATION: 98 % | BODY MASS INDEX: 26.58 KG/M2 | HEIGHT: 63 IN | TEMPERATURE: 98.6 F | RESPIRATION RATE: 18 BRPM | DIASTOLIC BLOOD PRESSURE: 76 MMHG

## 2025-07-18 DIAGNOSIS — B37.0 OROPHARYNGEAL CANDIDIASIS: Primary | ICD-10-CM

## 2025-07-18 PROCEDURE — 99213 OFFICE O/P EST LOW 20 MIN: CPT

## 2025-07-18 RX ORDER — CLOTRIMAZOLE 10 MG/1
10 LOZENGE ORAL
Qty: 70 TABLET | Refills: 0 | Status: SHIPPED | OUTPATIENT
Start: 2025-07-18 | End: 2025-08-01

## 2025-07-18 NOTE — PATIENT INSTRUCTIONS
Take lozenges 5 times daily  Complete the full course of antifungal therapy, even if symptoms improve before finishing the medication  Allow lozenges to dissolve slowly in the mouth do not chew or swallow whole  Avoid eating or drinking for at least 30 minutes after using topical antifungal agents to maximize contact time with oral mucosa  Follow-up with your primary care provider regarding predisposing factors such as diabetes, immunosuppression, recent antibiotic or steroid use, or xerostomia

## 2025-07-18 NOTE — PROGRESS NOTES
[facility]  Name: Roxanna Patton      : 1995      MRN: [id number]  Encounter Provider: CHIOMA Staton  Encounter Date: 2025   Encounter department: [facility] [place]  :  Assessment & Plan  Oropharyngeal candidiasis    Orders:    clotrimazole (MYCELEX) 10 mg cesar; Take 1 tablet (10 mg total) by mouth 5 (five) times a day for 14 days        Patient Instructions  Take lozenges 5 times daily  Complete the full course of antifungal therapy, even if symptoms improve before finishing the medication  Allow lozenges to dissolve slowly in the mouth do not chew or swallow whole  Avoid eating or drinking for at least 30 minutes after using topical antifungal agents to maximize contact time with oral mucosa  Follow-up with your primary care provider regarding predisposing factors such as diabetes, immunosuppression, recent antibiotic or steroid use, or xerostomia    Follow up with PCP in 3-5 days.  Proceed to  ER if symptoms worsen.    If tests are performed, our office will contact you with results only if changes need to made to the care plan discussed with you at the visit. [facility]    Chief Complaint:   Chief Complaint   Patient presents with    Oral Pain     Tongue pain that started this morning, also b/l ear pain      History of Present Illness   Oral Pain   This is a new problem. The current episode started today. The problem occurs constantly. The problem has been unchanged. The pain is at a severity of 4/10. Associated symptoms include difficulty swallowing. Pertinent negatives include no fever.         Review of Systems   Constitutional:  Negative for chills and fever.   HENT:  Negative for ear pain and sore throat.    Eyes:  Negative for pain and visual disturbance.   Respiratory:  Negative for cough and shortness of breath.    Cardiovascular:  Negative for chest pain and palpitations.   Gastrointestinal:  Negative for abdominal  "pain and vomiting.   Genitourinary:  Negative for dysuria and hematuria.   Musculoskeletal:  Negative for arthralgias and back pain.   Skin:  Negative for color change and rash.   Neurological:  Negative for seizures and syncope.   All other systems reviewed and are negative.    Past Medical History   Past Medical History[1]  Past Surgical History[2]  Family History[3]  she reports that she has quit smoking. Her smoking use included cigarettes. She has never used smokeless tobacco. She reports that she does not currently use alcohol. She reports that she does not use drugs.  Current Outpatient Medications   Medication Instructions    amphetamine-dextroamphetamine (ADDERALL XR) 10 MG 24 hr capsule 3 times weekly    busPIRone (BUSPAR) 15 mg, 2 times daily    clotrimazole (MYCELEX) 10 mg, Oral, 5 times daily    SODIUM FLUORIDE, DENTAL GEL, 1.1 % CREA Dental, Daily at bedtime, brush daily at bedtime, spit out, don't rinse. Floss. Nothing to eat or drink after.    venlafaxine (EFFEXOR-XR) 37.5 mg 24 hr capsule 1 capsule, Daily   Allergies[4]     Objective   /76   Pulse 75   Temp 98.6 °F (37 °C)   Resp 18   Ht 5' 3\" (1.6 m)   Wt 68 kg (150 lb)   SpO2 98%   BMI 26.57 kg/m²      Physical Exam  Vitals and nursing note reviewed.   Constitutional:       General: She is not in acute distress.     Appearance: She is well-developed.   HENT:      Head: Normocephalic and atraumatic.      Mouth/Throat:      Tongue: Lesions present.        Comments: Symmetrical lesions on back of tongue    Eyes:      Conjunctiva/sclera: Conjunctivae normal.       Cardiovascular:      Rate and Rhythm: Normal rate and regular rhythm.      Heart sounds: No murmur heard.  Pulmonary:      Effort: Pulmonary effort is normal. No respiratory distress.      Breath sounds: Normal breath sounds.   Abdominal:      Palpations: Abdomen is soft.      Tenderness: There is no abdominal tenderness.     Musculoskeletal:         General: No swelling.      " "Cervical back: Neck supple.   Lymphadenopathy:      Cervical: Cervical adenopathy present.      Right cervical: Superficial cervical adenopathy present.      Left cervical: Superficial cervical adenopathy present.     Skin:     General: Skin is warm and dry.      Capillary Refill: Capillary refill takes less than 2 seconds.     Neurological:      Mental Status: She is alert.     Psychiatric:         Mood and Affect: Mood normal.         Portions of the record may have been created with voice recognition software.  Occasional wrong word or \"sound a like\" substitutions may have occurred due to the inherent limitations of voice recognition software.  Read the chart carefully and recognize, using context, where substitutions have occurred.       [1]   Past Medical History:  Diagnosis Date    Depression     Insomnia     Psychiatric disorder     UTI (urinary tract infection)     Vertigo    [2]   Past Surgical History:  Procedure Laterality Date    WISDOM TOOTH EXTRACTION     [3]   Family History  Problem Relation Name Age of Onset    Heart disease Father      Cancer Paternal Grandmother          breast   [4] No Known Allergies    "